# Patient Record
Sex: MALE | Race: OTHER | HISPANIC OR LATINO | Employment: FULL TIME | URBAN - METROPOLITAN AREA
[De-identification: names, ages, dates, MRNs, and addresses within clinical notes are randomized per-mention and may not be internally consistent; named-entity substitution may affect disease eponyms.]

---

## 2018-12-02 ENCOUNTER — HOSPITAL ENCOUNTER (EMERGENCY)
Facility: HOSPITAL | Age: 28
Discharge: HOME | End: 2018-12-02
Attending: EMERGENCY MEDICINE
Payer: COMMERCIAL

## 2018-12-02 VITALS
TEMPERATURE: 98 F | SYSTOLIC BLOOD PRESSURE: 157 MMHG | BODY MASS INDEX: 25.61 KG/M2 | HEIGHT: 64 IN | OXYGEN SATURATION: 98 % | RESPIRATION RATE: 18 BRPM | HEART RATE: 74 BPM | DIASTOLIC BLOOD PRESSURE: 92 MMHG | WEIGHT: 150 LBS

## 2018-12-02 DIAGNOSIS — H91.90 HEARING LOSS: Primary | ICD-10-CM

## 2018-12-02 PROCEDURE — 99283 EMERGENCY DEPT VISIT LOW MDM: CPT

## 2018-12-02 NOTE — DISCHARGE INSTRUCTIONS
Hearing Loss   WHAT YOU NEED TO KNOW:   Hearing loss means you have trouble hearing or you cannot hear at all in one or both ears  Hearing loss can happen suddenly or slowly over time  DISCHARGE INSTRUCTIONS:   Return to the emergency department if:   · You have fluid, pus, or blood leaking from your ear  · You have sudden, severe hearing loss  Contact your healthcare provider if:   · You have a fever  · You have ear pain that is getting worse  · You have ringing in your ears or dizziness that will not go away  · You have questions or concerns about your condition or care  Manage your hearing loss:   · Protect your hearing  Use ear plugs or ear protectors if you do activities that are very loud  These include using a lawnmower and power tools or going to a concert that has loud music  Use well-fitting foam earplugs that completely block your ear canal  Do not listen to loud music through headphones or earphones  · If you have hearing aids, wear them regularly  Talk to your healthcare provider if you are having trouble using your hearing aid  · Ask about cochlear implants  If hearing aids do not help you, talk to your healthcare provider  Cochlear implants may help you hear better  A cochlear implant is a tiny device that is put into your cochlea (part of your inner ear) during surgery  · Assistive listening devices  (ALDs) pic up sound and send it through earphones or a headset  ALDs can help you hear better when you are in a place with background noise  Examples include theaters, classrooms, or auditoriums  ALDs are also available for phones  ALDs can be used alone or with hearing aids or cochlear implants  · Tell people that you have hearing loss  Ask people to face you directly when they speak to you, and to slow down if they are speaking too fast  When you are in a group setting, sit in a location where you can clearly see the faces of the people who are speaking   Ask people not to speak loudly or shout when they are speaking to you  Try to talk with others in a quiet place  Background noise makes it harder for you to hear  · Pay close attention to your surroundings when you drive  Do not talk to people in your car while you are driving  Watch for problems on the road or approaching emergency vehicles  Follow up with your healthcare provider or audiologist as directed:  Write down your questions so you remember to ask them during your visits  © 2017 [facility] Information is for End User's use only and may not be sold, redistributed or otherwise used for commercial purposes  All illustrations and images included in CareNotes® are the copyrighted property of A D A M , Inc  or Adelso Carito  The above information is an  only  It is not intended as medical advice for individual conditions or treatments  Talk to your doctor, nurse or pharmacist before following any medical regimen to see if it is safe and effective for you

## 2018-12-17 NOTE — ED PROVIDER NOTES
History  Chief Complaint   Patient presents with    Hearing Problem     patient has chronic hearing loss, but in the last few days he has had increased difficulty hearing and is having problems hearing his own voice  34year old male presents today complaining of chronic hearing loss over the past 8-10 years  Pt has seen an ENT and an audiologist in the past but not recently  "They couldn't find anything wrong " Pt feels that it may be related to allergies, as he notices that his symptoms are worse in certain weather conditions and somewhat improve with the use of flonase and PO allergy medications  Pt denies any fevers, pain, tinnitus or drainage from the ears  Feels like his ear canal "swells shut sometimes"  Denies headache, sore throat, cough, congestion  Pt is requesting referral to a specialist because he feels that his hearing loss is worsening  Pt denies any trauma to the head, does not use q-tips  None       History reviewed  No pertinent past medical history  Past Surgical History:   Procedure Laterality Date    TONSILLECTOMY         History reviewed  No pertinent family history  I have reviewed and agree with the history as documented  Social History   Substance Use Topics    Smoking status: Never Smoker    Smokeless tobacco: Not on file    Alcohol use No        Review of Systems   HENT: Positive for hearing loss  All other systems reviewed and are negative  Physical Exam  Physical Exam   Constitutional: He is oriented to person, place, and time  He appears well-developed and well-nourished  No distress  HENT:   Head: Normocephalic and atraumatic  Right Ear: Tympanic membrane normal  Tympanic membrane is not perforated and not erythematous  No middle ear effusion  Left Ear: Tympanic membrane normal  Tympanic membrane is not perforated and not erythematous  No middle ear effusion  Mouth/Throat: Oropharynx is clear and moist  No oropharyngeal exudate     Eyes: Conjunctivae are normal    Neck: Normal range of motion  Neck supple  Cardiovascular: Normal rate, regular rhythm and normal heart sounds  Pulmonary/Chest: Effort normal and breath sounds normal  No respiratory distress  He has no wheezes  He has no rales  Neurological: He is alert and oriented to person, place, and time  Skin: Skin is warm and dry  Capillary refill takes less than 2 seconds  He is not diaphoretic  Psychiatric: He has a normal mood and affect  His behavior is normal        Vital Signs  ED Triage Vitals [12/02/18 1535]   Temperature Pulse Respirations Blood Pressure SpO2   98 °F (36 7 °C) 74 18 157/92 98 %      Temp Source Heart Rate Source Patient Position - Orthostatic VS BP Location FiO2 (%)   Oral Monitor -- Left arm --      Pain Score       No Pain           Vitals:    12/02/18 1535   BP: 157/92   Pulse: 74       Visual Acuity      ED Medications  Medications - No data to display    Diagnostic Studies  Results Reviewed     None                 No orders to display              Procedures  Procedures       Phone Contacts  ED Phone Contact    ED Course                               MDM  CritCare Time    Disposition  Final diagnoses:   Hearing loss     Time reflects when diagnosis was documented in both MDM as applicable and the Disposition within this note     Time User Action Codes Description Comment    12/2/2018  4:31 PM Chelsea Kruger Add [H91 90] Hearing loss       ED Disposition     ED Disposition Condition Comment    Discharge  [facility] discharge to home/self care  Condition at discharge: Good        Follow-up Information     Follow up With Specialties Details Why [address] DO Family Medicine   [address] [place] [id number]  [phone]      Rene Larsen MD Otolaryngology   [address] [place] [id number]  [address] DO Otolaryngology   [address] [place] [id number]  [phone]            There are no discharge medications for this patient  No discharge procedures on file      ED Provider  Electronically Signed by           Kika Banks PA-C  12/17/18 7799

## 2018-12-20 ENCOUNTER — TRANSCRIBE ORDERS (OUTPATIENT)
Dept: INTERNAL MEDICINE CLINIC | Facility: CLINIC | Age: 28
End: 2018-12-20

## 2018-12-20 DIAGNOSIS — H91.90 HEARING LOSS: Primary | ICD-10-CM

## 2019-02-08 ENCOUNTER — CONSULT (OUTPATIENT)
Dept: MULTI SPECIALTY CLINIC | Facility: CLINIC | Age: 29
End: 2019-02-08

## 2019-02-08 VITALS
BODY MASS INDEX: 27.7 KG/M2 | WEIGHT: 162.26 LBS | HEIGHT: 64 IN | SYSTOLIC BLOOD PRESSURE: 130 MMHG | DIASTOLIC BLOOD PRESSURE: 84 MMHG

## 2019-02-08 DIAGNOSIS — H93.13 TINNITUS OF BOTH EARS: ICD-10-CM

## 2019-02-08 DIAGNOSIS — H91.90 HEARING LOSS: Primary | ICD-10-CM

## 2019-02-08 PROCEDURE — 99202 OFFICE O/P NEW SF 15 MIN: CPT | Performed by: PHYSICIAN ASSISTANT

## 2019-02-08 RX ORDER — LORATADINE 10 MG/1
10 TABLET ORAL DAILY
COMMUNITY
End: 2019-05-02

## 2019-02-08 NOTE — PATIENT INSTRUCTIONS
Hearing Loss   WHAT YOU NEED TO KNOW:   What is hearing loss? Hearing loss means you have trouble hearing or you cannot hear at all in one or both ears  Hearing loss can happen suddenly or slowly over time  What are the types of hearing loss? · Conductive hearing loss  occurs when there is a problem with the outer or middle ear  Sound waves cannot reach your inner ear  This type of hearing loss may be caused by earwax buildup, fluid, or a punctured ear drum  It can often be treated by correcting the cause of the problem  · Sensorineural hearing loss  is caused by damage to parts of the inner ear  There is usually no cure for sensorineural hearing loss  · Mixed hearing loss  includes both conductive and sensorineural hearing loss  What causes hearing loss? · Aging    · Regular exposure to loud noise    · Head injury     · Blockage in your ear caused by earwax buildup, swelling, cyst, or other growth    · Medical conditions such as ear infections or otosclerosis (abnormal growth of bones in the ear)    · Medicines that damage your ears such as aspirin, certain antibiotics, and diuretics  What are the signs and symptoms that you may have hearing loss? · You often ask others to repeat what they just said  You may think people are mumbling or not speaking clearly  Family members ask you if your hearing is okay  · You cup your hand behind one of your ears when you listen  · You need to have the radio or television louder than usual     · You need to lean forward or turn your head to be able to hear  · You have ringing or buzzing in your ears, or you are dizzy  · You avoid certain situations because you have a hard time hearing  How is hearing loss diagnosed? Your healthcare provider will ask about your hearing loss and examine your ears  You may need any of the following:  · Hearing tests  may be done to check how well you hear whispered words or soft sounds such as a finger rub       · A tuning fork  may be used to test your hearing  A tuning fork is made of metal  It vibrates and makes noise when it strikes an object  Your healthcare provider will hold the tuning fork to the left and right of your head  He will ask if you can hear the noise and feel the vibration in each ear  · Audiometry  is a test used to measure how well you can hear different sounds  You will put on headphones that are attached to a machine  Sounds will be sent through the headphones  You will press a button or raise your hand when you hear the sounds  Each ear will be tested separately  Another device will be placed on the bone behind your ear  The device will test how well vibration moves through the bones  This is called bone conduction  · Tympanometry  is a test used to find hearing problems in the middle ear  A device is placed into your ear  The device creates pressure changes that make your eardrum vibrate  How is hearing loss treated? Treatment depends on the cause of your hearing loss  Removal of earwax or treatment for any medical conditions that have caused your hearing loss may be needed  You may need any of the following:  · A hearing aid  is a small device that fits inside your ear and helps you hear better  Your healthcare provider can help you choose a hearing aid that is right for you  · A cochlear implant  is a tiny device that is put into your cochlea (part of your inner ear) during surgery  This device can only be used in people with sensorineural hearing loss  · Assistive listening devices  (ALDs)  sound and send it through earphones or a headset  ALDs can help you hear better when you are in a place with background noise  Examples include theaters, classrooms, or auditoriums  ALDs are also available for phones  ALDs can be used alone or with hearing aids or cochlear implants  · Surgery  may be needed if your hearing loss is caused by otosclerosis   Surgery may also be done to place small tubes in your ear  These tubes help drain fluid and help prevent ear infections  How can I manage my hearing loss? · Protect your hearing  Use ear plugs or ear protectors if you do activities that are very loud  These include using a lawnmower and power tools or going to a concert that has loud music  Use well-fitting foam earplugs that completely block your ear canal  Do not listen to loud music through headphones or earphones  · Tell people that you have hearing loss  Ask people to face you directly when they speak to you, and to slow down if they are speaking too fast  When you are in a group setting, sit in a location where you can clearly see the faces of the people who are speaking  Ask people not to speak loudly or shout when they are speaking to you  Try to talk with others in a quiet place  Background noise makes it harder for you to hear  · Pay close attention to your surroundings when you drive  Do not talk to people in your car while you are driving  Watch for problems on the road or approaching emergency vehicles  When should I seek immediate care? · You have fluid, pus, or blood leaking from your ear  · You have sudden, severe hearing loss  When should I contact my healthcare provider? · You have a fever  · You have ear pain that is getting worse  · You have ringing in your ears or dizziness that will not go away  · You have questions or concerns about your condition or care  CARE AGREEMENT:   You have the right to help plan your care  Learn about your health condition and how it may be treated  Discuss treatment options with your caregivers to decide what care you want to receive  You always have the right to refuse treatment  The above information is an  only  It is not intended as medical advice for individual conditions or treatments   Talk to your doctor, nurse or pharmacist before following any medical regimen to see if it is safe and effective for you  © 2017 [facility] [facility] Information is for End User's use only and may not be sold, redistributed or otherwise used for commercial purposes  All illustrations and images included in CareNotes® are the copyrighted property of A D A M , Inc  or Adelso Arzate

## 2019-02-08 NOTE — PROGRESS NOTES
Consultation - Otolaryngology - Head and Neck Surgery  Facial Plastic and Reconstructive Surgery  [facility] 34 y o  male MRN: [id number]  Encounter: [id number]        Assessment/Plan:  1  Hearing loss  Ambulatory Referral to Otolaryngology         History of Present Illness   Physician Requesting Consult: Phillip Briones,   Reason for Consult / Principal Problem: Gradual hearing loss, tinnitus  HPI: [facility] is a 34y o  year old male who presents with Patient is here for evaluation of gradual hearing loss x 10 years  Getting worse x 2  Years ago  Patient thinks it gets worse during allergy season  + popping in both ears  Ringing in both ears x 2 years  No hx of frequent ear infections as an adult  R ear pain x 1-2 months, intermittent, 5/10  No drainage  Saw ENT last month, had Audio done, hearing aids were recommended  No loud noise exposure  No family hx of hearing loss  Has hx of vertigo  Lately doing well, no recurrent vertigo for 1 year  Has known hx of allergies, currently on Fluticasone, Loratadine  Review of systems:  10 Point ROS was performed and negative except as above or otherwise noted in the medical record  Historical Information   Past Medical History:   Diagnosis Date    Asthma      Past Surgical History:   Procedure Laterality Date    EYE SURGERY      TONSILLECTOMY       Social History   History   Alcohol Use No     History   Drug Use No     History   Smoking Status    Never Smoker   Smokeless Tobacco    Never Used     Family History: non-contributory    No current outpatient prescriptions on file prior to visit  No current facility-administered medications on file prior to visit  No Known Allergies    Vitals:    02/08/19 1258   BP: 130/84       Physical Exam   Constitutional: Oriented to person, place, and time  Well-developed and well-nourished, no apparent distress, non-toxic appearance   Cooperative, able to hear and answer questions without difficulty  Voice: Normal voice quality  Head: Normocephalic, atraumatic  No scars, masses or lesions  Face: Symmetric, no edema, no sinus tenderness  Eyes: Vision grossly intact, extra-ocular movement intact  Ears: External ears normal  Tympanic membranes intact with intact normal landmarks  No post-auricular erythema or tenderness  Nose: Septum intact, nares clear  Mucosa moist, turbinates well appearing  No crusting, polyps or discharge evident  Oral cavity: Dentition intact  Mucosa moist, lips without lesions or masses  Tongue mobile, floor of mouth soft and flat  Hard palate intact  No masses or lesions  Oropharynx: Uvula is midline, soft palate intact without lesion or mass  Oropharyngeal inlet without obstruction  Tonsils unremarkable  Posterior pharyngeal wall clear  No masses or lesions  Salivary glands:  Parotid glands and submandibular glands symmetric, no enlargement or tenderness  Neck: Normal laryngeal elevation with swallow  Trachea midline  No masses or lesions  No palpable adenopathy  Thyroid: Without tenderness or palpable nodules  Neurological: Cranial nerves 2-12 intact  Skin: Skin is warm and dry  Psychiatric: Normal mood and affect  1  Hearing loss     2  Tinnitus of both ears         Patient will have his Audio results faxed over to us  Faxed number given to the patient and his sister  He will continue with current regimen for allergies  · Protect your hearing  Use ear plugs or ear protectors if you do activities that are very loud  These include using a lawnmower and power tools or going to a concert that has loud music  Use well-fitting foam earplugs that completely block your ear canal  Do not listen to loud music through headphones or earphones  · Tell people that you have hearing loss    Ask people to face you directly when they speak to you, and to slow down if they are speaking too fast  When you are in a group setting, sit in a location where you can clearly see the faces of the people who are speaking  Ask people not to speak loudly or shout when they are speaking to you  Try to talk with others in a quiet place  Background noise makes it harder for you to hear  Greater than 40 minutes were spent in consultation  More than 1/2 of that time was spent in counselling and coordination of care

## 2019-03-04 ENCOUNTER — ANESTHESIA EVENT (INPATIENT)
Dept: PERIOP | Facility: HOSPITAL | Age: 29
DRG: 480 | End: 2019-03-04
Payer: COMMERCIAL

## 2019-03-04 ENCOUNTER — APPOINTMENT (EMERGENCY)
Dept: RADIOLOGY | Facility: HOSPITAL | Age: 29
DRG: 480 | End: 2019-03-04
Payer: COMMERCIAL

## 2019-03-04 ENCOUNTER — HOSPITAL ENCOUNTER (INPATIENT)
Facility: HOSPITAL | Age: 29
LOS: 6 days | Discharge: HOME HEALTH | DRG: 480 | End: 2019-03-10
Attending: SURGERY
Payer: COMMERCIAL

## 2019-03-04 ENCOUNTER — ANESTHESIA (INPATIENT)
Dept: PERIOP | Facility: HOSPITAL | Age: 29
DRG: 480 | End: 2019-03-04
Payer: COMMERCIAL

## 2019-03-04 DIAGNOSIS — R33.9 URINARY RETENTION: ICD-10-CM

## 2019-03-04 DIAGNOSIS — Z47.89 AFTERCARE FOLLOWING SURGERY OF THE MUSCULOSKELETAL SYSTEM: ICD-10-CM

## 2019-03-04 DIAGNOSIS — S72.352A CLOSED DISPLACED COMMINUTED FRACTURE OF SHAFT OF LEFT FEMUR, INITIAL ENCOUNTER (HCC): ICD-10-CM

## 2019-03-04 DIAGNOSIS — S72.002A CLOSED FRACTURE OF NECK OF LEFT FEMUR, INITIAL ENCOUNTER (HCC): Primary | ICD-10-CM

## 2019-03-04 DIAGNOSIS — S82.042B: ICD-10-CM

## 2019-03-04 DIAGNOSIS — S82.142B TYPE I OR II OPEN FRACTURE OF LEFT TIBIAL PLATEAU, INITIAL ENCOUNTER: ICD-10-CM

## 2019-03-04 LAB
ABO GROUP BLD: NORMAL
ABO GROUP BLD: NORMAL
ALBUMIN SERPL BCP-MCNC: 3.6 G/DL (ref 3.5–5)
ALP SERPL-CCNC: 90 U/L (ref 46–116)
ALT SERPL W P-5'-P-CCNC: 111 U/L (ref 12–78)
ANION GAP SERPL CALCULATED.3IONS-SCNC: 6 MMOL/L (ref 4–13)
ANION GAP SERPL CALCULATED.3IONS-SCNC: 8 MMOL/L (ref 4–13)
APTT PPP: 25 SECONDS (ref 26–38)
AST SERPL W P-5'-P-CCNC: 98 U/L (ref 5–45)
BASE EXCESS BLDA CALC-SCNC: -3 MMOL/L (ref -2–3)
BASOPHILS # BLD AUTO: 0.02 THOUSANDS/ΜL (ref 0–0.1)
BASOPHILS # BLD AUTO: 0.07 THOUSANDS/ΜL (ref 0–0.1)
BASOPHILS NFR BLD AUTO: 0 % (ref 0–1)
BASOPHILS NFR BLD AUTO: 0 % (ref 0–1)
BILIRUB SERPL-MCNC: 1.62 MG/DL (ref 0.2–1)
BLD GP AB SCN SERPL QL: NEGATIVE
BLD GP AB SCN SERPL QL: NEGATIVE
BUN SERPL-MCNC: 14 MG/DL (ref 5–25)
BUN SERPL-MCNC: 15 MG/DL (ref 5–25)
CA-I BLD-SCNC: 1.16 MMOL/L (ref 1.12–1.32)
CALCIUM SERPL-MCNC: 7.9 MG/DL (ref 8.3–10.1)
CALCIUM SERPL-MCNC: 8.1 MG/DL (ref 8.3–10.1)
CHLORIDE SERPL-SCNC: 110 MMOL/L (ref 100–108)
CHLORIDE SERPL-SCNC: 110 MMOL/L (ref 100–108)
CO2 SERPL-SCNC: 23 MMOL/L (ref 21–32)
CO2 SERPL-SCNC: 24 MMOL/L (ref 21–32)
CREAT SERPL-MCNC: 0.9 MG/DL (ref 0.6–1.3)
CREAT SERPL-MCNC: 0.91 MG/DL (ref 0.6–1.3)
EOSINOPHIL # BLD AUTO: 0 THOUSAND/ΜL (ref 0–0.61)
EOSINOPHIL # BLD AUTO: 0.27 THOUSAND/ΜL (ref 0–0.61)
EOSINOPHIL NFR BLD AUTO: 0 % (ref 0–6)
EOSINOPHIL NFR BLD AUTO: 2 % (ref 0–6)
ERYTHROCYTE [DISTWIDTH] IN BLOOD BY AUTOMATED COUNT: 12.4 % (ref 11.6–15.1)
ERYTHROCYTE [DISTWIDTH] IN BLOOD BY AUTOMATED COUNT: 12.7 % (ref 11.6–15.1)
GFR SERPL CREATININE-BSD FRML MDRD: 116 ML/MIN/1.73SQ M
GFR SERPL CREATININE-BSD FRML MDRD: 117 ML/MIN/1.73SQ M
GLUCOSE SERPL-MCNC: 142 MG/DL (ref 65–140)
GLUCOSE SERPL-MCNC: 143 MG/DL (ref 65–140)
GLUCOSE SERPL-MCNC: 145 MG/DL (ref 65–140)
HCO3 BLDA-SCNC: 22.4 MMOL/L (ref 24–30)
HCT VFR BLD AUTO: 30.8 % (ref 36.5–49.3)
HCT VFR BLD AUTO: 39.4 % (ref 36.5–49.3)
HCT VFR BLD CALC: 38 % (ref 36.5–49.3)
HGB BLD-MCNC: 10.5 G/DL (ref 12–17)
HGB BLD-MCNC: 13.8 G/DL (ref 12–17)
HGB BLDA-MCNC: 12.9 G/DL (ref 12–17)
IMM GRANULOCYTES # BLD AUTO: 0.04 THOUSAND/UL (ref 0–0.2)
IMM GRANULOCYTES # BLD AUTO: 0.18 THOUSAND/UL (ref 0–0.2)
IMM GRANULOCYTES NFR BLD AUTO: 0 % (ref 0–2)
IMM GRANULOCYTES NFR BLD AUTO: 1 % (ref 0–2)
INR PPP: 1.06 (ref 0.86–1.17)
LYMPHOCYTES # BLD AUTO: 0.98 THOUSANDS/ΜL (ref 0.6–4.47)
LYMPHOCYTES # BLD AUTO: 4.78 THOUSANDS/ΜL (ref 0.6–4.47)
LYMPHOCYTES NFR BLD AUTO: 28 % (ref 14–44)
LYMPHOCYTES NFR BLD AUTO: 8 % (ref 14–44)
MCH RBC QN AUTO: 29.1 PG (ref 26.8–34.3)
MCH RBC QN AUTO: 29.5 PG (ref 26.8–34.3)
MCHC RBC AUTO-ENTMCNC: 34.1 G/DL (ref 31.4–37.4)
MCHC RBC AUTO-ENTMCNC: 35 G/DL (ref 31.4–37.4)
MCV RBC AUTO: 84 FL (ref 82–98)
MCV RBC AUTO: 85 FL (ref 82–98)
MONOCYTES # BLD AUTO: 0.62 THOUSAND/ΜL (ref 0.17–1.22)
MONOCYTES # BLD AUTO: 0.94 THOUSAND/ΜL (ref 0.17–1.22)
MONOCYTES NFR BLD AUTO: 5 % (ref 4–12)
MONOCYTES NFR BLD AUTO: 5 % (ref 4–12)
NEUTROPHILS # BLD AUTO: 10.13 THOUSANDS/ΜL (ref 1.85–7.62)
NEUTROPHILS # BLD AUTO: 11.04 THOUSANDS/ΜL (ref 1.85–7.62)
NEUTS SEG NFR BLD AUTO: 64 % (ref 43–75)
NEUTS SEG NFR BLD AUTO: 87 % (ref 43–75)
NRBC BLD AUTO-RTO: 0 /100 WBCS
NRBC BLD AUTO-RTO: 0 /100 WBCS
PCO2 BLD: 24 MMOL/L (ref 21–32)
PCO2 BLD: 41.9 MM HG (ref 42–50)
PH BLD: 7.34 [PH] (ref 7.3–7.4)
PLATELET # BLD AUTO: 214 THOUSANDS/UL (ref 149–390)
PLATELET # BLD AUTO: 307 THOUSANDS/UL (ref 149–390)
PMV BLD AUTO: 9.3 FL (ref 8.9–12.7)
PMV BLD AUTO: 9.4 FL (ref 8.9–12.7)
PO2 BLD: 52 MM HG (ref 35–45)
POTASSIUM BLD-SCNC: 3.4 MMOL/L (ref 3.5–5.3)
POTASSIUM SERPL-SCNC: 3.5 MMOL/L (ref 3.5–5.3)
POTASSIUM SERPL-SCNC: 3.5 MMOL/L (ref 3.5–5.3)
PROT SERPL-MCNC: 6.5 G/DL (ref 6.4–8.2)
PROTHROMBIN TIME: 13.9 SECONDS (ref 11.8–14.2)
RBC # BLD AUTO: 3.61 MILLION/UL (ref 3.88–5.62)
RBC # BLD AUTO: 4.68 MILLION/UL (ref 3.88–5.62)
RH BLD: POSITIVE
RH BLD: POSITIVE
SAO2 % BLD FROM PO2: 84 % (ref 95–98)
SODIUM BLD-SCNC: 142 MMOL/L (ref 136–145)
SODIUM SERPL-SCNC: 139 MMOL/L (ref 136–145)
SODIUM SERPL-SCNC: 142 MMOL/L (ref 136–145)
SPECIMEN EXPIRATION DATE: NORMAL
SPECIMEN EXPIRATION DATE: NORMAL
SPECIMEN SOURCE: ABNORMAL
WBC # BLD AUTO: 11.79 THOUSAND/UL (ref 4.31–10.16)
WBC # BLD AUTO: 17.28 THOUSAND/UL (ref 4.31–10.16)

## 2019-03-04 PROCEDURE — 73552 X-RAY EXAM OF FEMUR 2/>: CPT

## 2019-03-04 PROCEDURE — C1713 ANCHOR/SCREW BN/BN,TIS/BN: HCPCS | Performed by: ORTHOPAEDIC SURGERY

## 2019-03-04 PROCEDURE — 86901 BLOOD TYPING SEROLOGIC RH(D): CPT | Performed by: SURGERY

## 2019-03-04 PROCEDURE — 86923 COMPATIBILITY TEST ELECTRIC: CPT

## 2019-03-04 PROCEDURE — 73560 X-RAY EXAM OF KNEE 1 OR 2: CPT

## 2019-03-04 PROCEDURE — 27235 TREAT THIGH FRACTURE: CPT | Performed by: ORTHOPAEDIC SURGERY

## 2019-03-04 PROCEDURE — 96375 TX/PRO/DX INJ NEW DRUG ADDON: CPT

## 2019-03-04 PROCEDURE — 82330 ASSAY OF CALCIUM: CPT

## 2019-03-04 PROCEDURE — 73080 X-RAY EXAM OF ELBOW: CPT

## 2019-03-04 PROCEDURE — 99285 EMERGENCY DEPT VISIT HI MDM: CPT

## 2019-03-04 PROCEDURE — 86900 BLOOD TYPING SEROLOGIC ABO: CPT | Performed by: EMERGENCY MEDICINE

## 2019-03-04 PROCEDURE — 82803 BLOOD GASES ANY COMBINATION: CPT

## 2019-03-04 PROCEDURE — 80048 BASIC METABOLIC PNL TOTAL CA: CPT | Performed by: SURGERY

## 2019-03-04 PROCEDURE — 84132 ASSAY OF SERUM POTASSIUM: CPT

## 2019-03-04 PROCEDURE — 84295 ASSAY OF SERUM SODIUM: CPT

## 2019-03-04 PROCEDURE — 85025 COMPLETE CBC W/AUTO DIFF WBC: CPT | Performed by: SURGERY

## 2019-03-04 PROCEDURE — 72125 CT NECK SPINE W/O DYE: CPT

## 2019-03-04 PROCEDURE — 90715 TDAP VACCINE 7 YRS/> IM: CPT | Performed by: SURGERY

## 2019-03-04 PROCEDURE — 86850 RBC ANTIBODY SCREEN: CPT | Performed by: SURGERY

## 2019-03-04 PROCEDURE — 27524 TREAT KNEECAP FRACTURE: CPT | Performed by: ORTHOPAEDIC SURGERY

## 2019-03-04 PROCEDURE — 99222 1ST HOSP IP/OBS MODERATE 55: CPT | Performed by: SURGERY

## 2019-03-04 PROCEDURE — 82947 ASSAY GLUCOSE BLOOD QUANT: CPT

## 2019-03-04 PROCEDURE — 86900 BLOOD TYPING SEROLOGIC ABO: CPT | Performed by: SURGERY

## 2019-03-04 PROCEDURE — 86850 RBC ANTIBODY SCREEN: CPT | Performed by: EMERGENCY MEDICINE

## 2019-03-04 PROCEDURE — 27506 TREATMENT OF THIGH FRACTURE: CPT | Performed by: ORTHOPAEDIC SURGERY

## 2019-03-04 PROCEDURE — 99221 1ST HOSP IP/OBS SF/LOW 40: CPT | Performed by: ORTHOPAEDIC SURGERY

## 2019-03-04 PROCEDURE — 11010 DEBRIDE SKIN AT FX SITE: CPT | Performed by: ORTHOPAEDIC SURGERY

## 2019-03-04 PROCEDURE — 86901 BLOOD TYPING SEROLOGIC RH(D): CPT | Performed by: EMERGENCY MEDICINE

## 2019-03-04 PROCEDURE — 70450 CT HEAD/BRAIN W/O DYE: CPT

## 2019-03-04 PROCEDURE — C1769 GUIDE WIRE: HCPCS | Performed by: ORTHOPAEDIC SURGERY

## 2019-03-04 PROCEDURE — 85014 HEMATOCRIT: CPT

## 2019-03-04 PROCEDURE — 90471 IMMUNIZATION ADMIN: CPT

## 2019-03-04 PROCEDURE — 73030 X-RAY EXAM OF SHOULDER: CPT

## 2019-03-04 PROCEDURE — 71260 CT THORAX DX C+: CPT

## 2019-03-04 PROCEDURE — 85025 COMPLETE CBC W/AUTO DIFF WBC: CPT | Performed by: ORTHOPAEDIC SURGERY

## 2019-03-04 PROCEDURE — 36415 COLL VENOUS BLD VENIPUNCTURE: CPT | Performed by: SURGERY

## 2019-03-04 PROCEDURE — 0QSF04Z REPOSITION LEFT PATELLA WITH INTERNAL FIXATION DEVICE, OPEN APPROACH: ICD-10-PCS | Performed by: ORTHOPAEDIC SURGERY

## 2019-03-04 PROCEDURE — 85730 THROMBOPLASTIN TIME PARTIAL: CPT | Performed by: EMERGENCY MEDICINE

## 2019-03-04 PROCEDURE — 74177 CT ABD & PELVIS W/CONTRAST: CPT

## 2019-03-04 PROCEDURE — 0QSC06Z REPOSITION LEFT LOWER FEMUR WITH INTRAMEDULLARY INTERNAL FIXATION DEVICE, OPEN APPROACH: ICD-10-PCS | Performed by: ORTHOPAEDIC SURGERY

## 2019-03-04 PROCEDURE — 0QSH04Z REPOSITION LEFT TIBIA WITH INTERNAL FIXATION DEVICE, OPEN APPROACH: ICD-10-PCS | Performed by: ORTHOPAEDIC SURGERY

## 2019-03-04 PROCEDURE — 0QSC04Z REPOSITION LEFT LOWER FEMUR WITH INTERNAL FIXATION DEVICE, OPEN APPROACH: ICD-10-PCS | Performed by: ORTHOPAEDIC SURGERY

## 2019-03-04 PROCEDURE — 27536 TREAT KNEE FRACTURE: CPT | Performed by: ORTHOPAEDIC SURGERY

## 2019-03-04 PROCEDURE — 80053 COMPREHEN METABOLIC PANEL: CPT | Performed by: EMERGENCY MEDICINE

## 2019-03-04 PROCEDURE — 96365 THER/PROPH/DIAG IV INF INIT: CPT

## 2019-03-04 PROCEDURE — 85610 PROTHROMBIN TIME: CPT | Performed by: EMERGENCY MEDICINE

## 2019-03-04 DEVICE — 2.4MM VA LOCKING SCREW STARDRIVE 18MM
Type: IMPLANTABLE DEVICE | Site: PATELLA | Status: NON-FUNCTIONAL
Removed: 2020-06-12

## 2019-03-04 DEVICE — 2.4MM CORTEX SCREW SLF-TPNG WITH T8 STARDRIVE RECESS 16MM
Type: IMPLANTABLE DEVICE | Site: PATELLA | Status: NON-FUNCTIONAL
Removed: 2020-06-12

## 2019-03-04 DEVICE — 6.5MM CANNULATED SCREW 16MM THREAD 80MM: Type: IMPLANTABLE DEVICE | Site: HIP | Status: FUNCTIONAL

## 2019-03-04 DEVICE — 3.5MM CORTEX SCREW SELF-TAPPING 32MM
Type: IMPLANTABLE DEVICE | Site: TIBIA | Status: NON-FUNCTIONAL
Removed: 2020-06-12

## 2019-03-04 DEVICE — 3.5MM CORTEX SCREW SELF-TAPPING 24MM
Type: IMPLANTABLE DEVICE | Site: TIBIA | Status: NON-FUNCTIONAL
Removed: 2020-06-12

## 2019-03-04 DEVICE — 11MM TI CANN RETRO/ANTEGRADE FEMORAL NAIL-EX/320MM-STERILE
Type: IMPLANTABLE DEVICE | Site: FEMUR | Status: FUNCTIONAL
Brand: EXPERT NAIL

## 2019-03-04 DEVICE — 3.5MM CORTEX SCREW SELF-TAPPING 48MM
Type: IMPLANTABLE DEVICE | Site: TIBIA | Status: NON-FUNCTIONAL
Removed: 2020-06-12

## 2019-03-04 DEVICE — 3.5MM LCP® MEDIAL PROXIMAL TIBIA PLATE 10H/LEFT 171MM
Type: IMPLANTABLE DEVICE | Site: TIBIA | Status: NON-FUNCTIONAL
Brand: LCP
Removed: 2020-06-12

## 2019-03-04 DEVICE — 7.3MM CANNULATED SCREW 16MM THREAD/80MM: Type: IMPLANTABLE DEVICE | Site: HIP | Status: FUNCTIONAL

## 2019-03-04 DEVICE — 2.4MM CORTEX SCREW SLF-TPNG WITH T8 STARDRIVE RECESS 12MM
Type: IMPLANTABLE DEVICE | Site: PATELLA | Status: NON-FUNCTIONAL
Removed: 2020-06-12

## 2019-03-04 DEVICE — 3.5MM CORTEX SCREW SELF-TAPPING 30MM
Type: IMPLANTABLE DEVICE | Site: TIBIA | Status: NON-FUNCTIONAL
Removed: 2020-06-12

## 2019-03-04 DEVICE — 3.5MM PELVIC CORTEX SCREW SELF-TAPPING 75MM
Type: IMPLANTABLE DEVICE | Site: TIBIA | Status: NON-FUNCTIONAL
Removed: 2020-06-12

## 2019-03-04 DEVICE — 2.4MM VA LOCKING SCREW STARDRIVE 22MM
Type: IMPLANTABLE DEVICE | Site: PATELLA | Status: NON-FUNCTIONAL
Removed: 2020-06-12

## 2019-03-04 DEVICE — 5.0MM TI LOCKING SCREW W/T25 STARDRIVE 60MM F/IM NAIL-STER: Type: IMPLANTABLE DEVICE | Site: FEMUR | Status: FUNCTIONAL

## 2019-03-04 DEVICE — 3.5MM CORTEX SCREW SELF-TAPPING 34MM
Type: IMPLANTABLE DEVICE | Site: TIBIA | Status: NON-FUNCTIONAL
Removed: 2020-06-12

## 2019-03-04 DEVICE — 2.4MM VA LOCKING SCREW STARDRIVE 10MM
Type: IMPLANTABLE DEVICE | Site: PATELLA | Status: NON-FUNCTIONAL
Removed: 2020-06-12

## 2019-03-04 DEVICE — 5.0MM TI LOCKING SCREW W/T25 STARDRIVE 80MM F/IM NAIL-STER: Type: IMPLANTABLE DEVICE | Site: FEMUR | Status: FUNCTIONAL

## 2019-03-04 DEVICE — 3.5MM CORTEX SCREW SELF-TAPPING 46MM
Type: IMPLANTABLE DEVICE | Site: TIBIA | Status: NON-FUNCTIONAL
Removed: 2020-06-12

## 2019-03-04 DEVICE — 2.4MM VA LOCKING SCREW STARDRIVE 20MM
Type: IMPLANTABLE DEVICE | Site: PATELLA | Status: NON-FUNCTIONAL
Removed: 2020-06-12

## 2019-03-04 DEVICE — 5.0MM TI LOCKING SCREW W/T25 STARDRIVE 34MM F/IM NAIL-STER: Type: IMPLANTABLE DEVICE | Site: FEMUR | Status: FUNCTIONAL

## 2019-03-04 DEVICE — 6.5MM CANNULATED SCREW 16MM THREAD 75MM: Type: IMPLANTABLE DEVICE | Site: HIP | Status: FUNCTIONAL

## 2019-03-04 DEVICE — 2.4MM/2.7MM VA-LOCKING MESH PLATE/5 X 12 HOLES: Type: IMPLANTABLE DEVICE | Site: PATELLA | Status: FUNCTIONAL

## 2019-03-04 DEVICE — 2.4MM VA LOCKING SCREW STARDRIVE 16MM
Type: IMPLANTABLE DEVICE | Site: PATELLA | Status: NON-FUNCTIONAL
Removed: 2020-06-12

## 2019-03-04 RX ORDER — KETAMINE HYDROCHLORIDE 50 MG/ML
INJECTION, SOLUTION, CONCENTRATE INTRAMUSCULAR; INTRAVENOUS AS NEEDED
Status: DISCONTINUED | OUTPATIENT
Start: 2019-03-04 | End: 2019-03-04 | Stop reason: SURG

## 2019-03-04 RX ORDER — ROCURONIUM BROMIDE 10 MG/ML
INJECTION, SOLUTION INTRAVENOUS AS NEEDED
Status: DISCONTINUED | OUTPATIENT
Start: 2019-03-04 | End: 2019-03-04 | Stop reason: SURG

## 2019-03-04 RX ORDER — CEFAZOLIN SODIUM 1 G/50ML
SOLUTION INTRAVENOUS
Status: COMPLETED | OUTPATIENT
Start: 2019-03-04 | End: 2019-03-04

## 2019-03-04 RX ORDER — SODIUM CHLORIDE, SODIUM LACTATE, POTASSIUM CHLORIDE, CALCIUM CHLORIDE 600; 310; 30; 20 MG/100ML; MG/100ML; MG/100ML; MG/100ML
100 INJECTION, SOLUTION INTRAVENOUS CONTINUOUS
Status: DISCONTINUED | OUTPATIENT
Start: 2019-03-04 | End: 2019-03-05

## 2019-03-04 RX ORDER — HYDROMORPHONE HYDROCHLORIDE 2 MG/ML
INJECTION, SOLUTION INTRAMUSCULAR; INTRAVENOUS; SUBCUTANEOUS AS NEEDED
Status: DISCONTINUED | OUTPATIENT
Start: 2019-03-04 | End: 2019-03-04 | Stop reason: SURG

## 2019-03-04 RX ORDER — HYDROMORPHONE HCL/PF 1 MG/ML
0.5 SYRINGE (ML) INJECTION
Status: DISCONTINUED | OUTPATIENT
Start: 2019-03-04 | End: 2019-03-04 | Stop reason: HOSPADM

## 2019-03-04 RX ORDER — OXYCODONE HYDROCHLORIDE 5 MG/1
5 TABLET ORAL EVERY 4 HOURS PRN
Qty: 30 TABLET | Refills: 0 | Status: SHIPPED | OUTPATIENT
Start: 2019-03-04 | End: 2019-03-14

## 2019-03-04 RX ORDER — PROMETHAZINE HYDROCHLORIDE 25 MG/ML
25 INJECTION, SOLUTION INTRAMUSCULAR; INTRAVENOUS ONCE AS NEEDED
Status: DISCONTINUED | OUTPATIENT
Start: 2019-03-04 | End: 2019-03-04 | Stop reason: HOSPADM

## 2019-03-04 RX ORDER — PROPOFOL 10 MG/ML
INJECTION, EMULSION INTRAVENOUS AS NEEDED
Status: DISCONTINUED | OUTPATIENT
Start: 2019-03-04 | End: 2019-03-04 | Stop reason: SURG

## 2019-03-04 RX ORDER — NEOSTIGMINE METHYLSULFATE 1 MG/ML
INJECTION INTRAVENOUS AS NEEDED
Status: DISCONTINUED | OUTPATIENT
Start: 2019-03-04 | End: 2019-03-04 | Stop reason: SURG

## 2019-03-04 RX ORDER — ONDANSETRON 2 MG/ML
INJECTION INTRAMUSCULAR; INTRAVENOUS AS NEEDED
Status: DISCONTINUED | OUTPATIENT
Start: 2019-03-04 | End: 2019-03-04 | Stop reason: SURG

## 2019-03-04 RX ORDER — LIDOCAINE HYDROCHLORIDE 10 MG/ML
INJECTION, SOLUTION INFILTRATION; PERINEURAL AS NEEDED
Status: DISCONTINUED | OUTPATIENT
Start: 2019-03-04 | End: 2019-03-04 | Stop reason: SURG

## 2019-03-04 RX ORDER — SODIUM CHLORIDE, SODIUM LACTATE, POTASSIUM CHLORIDE, CALCIUM CHLORIDE 600; 310; 30; 20 MG/100ML; MG/100ML; MG/100ML; MG/100ML
INJECTION, SOLUTION INTRAVENOUS CONTINUOUS PRN
Status: DISCONTINUED | OUTPATIENT
Start: 2019-03-04 | End: 2019-03-04 | Stop reason: SURG

## 2019-03-04 RX ORDER — CEFAZOLIN SODIUM 1 G/3ML
INJECTION, POWDER, FOR SOLUTION INTRAMUSCULAR; INTRAVENOUS AS NEEDED
Status: DISCONTINUED | OUTPATIENT
Start: 2019-03-04 | End: 2019-03-04 | Stop reason: SURG

## 2019-03-04 RX ORDER — FENTANYL CITRATE 50 UG/ML
INJECTION, SOLUTION INTRAMUSCULAR; INTRAVENOUS AS NEEDED
Status: DISCONTINUED | OUTPATIENT
Start: 2019-03-04 | End: 2019-03-04 | Stop reason: SURG

## 2019-03-04 RX ORDER — FENTANYL CITRATE 50 UG/ML
INJECTION, SOLUTION INTRAMUSCULAR; INTRAVENOUS CODE/TRAUMA/SEDATION MEDICATION
Status: COMPLETED | OUTPATIENT
Start: 2019-03-04 | End: 2019-03-04

## 2019-03-04 RX ORDER — OXYCODONE HYDROCHLORIDE 5 MG/1
5 TABLET ORAL EVERY 4 HOURS PRN
Status: DISCONTINUED | OUTPATIENT
Start: 2019-03-04 | End: 2019-03-10 | Stop reason: HOSPADM

## 2019-03-04 RX ORDER — SODIUM CHLORIDE, SODIUM GLUCONATE, SODIUM ACETATE, POTASSIUM CHLORIDE, MAGNESIUM CHLORIDE, SODIUM PHOSPHATE, DIBASIC, AND POTASSIUM PHOSPHATE .53; .5; .37; .037; .03; .012; .00082 G/100ML; G/100ML; G/100ML; G/100ML; G/100ML; G/100ML; G/100ML
125 INJECTION, SOLUTION INTRAVENOUS CONTINUOUS
Status: DISCONTINUED | OUTPATIENT
Start: 2019-03-04 | End: 2019-03-05

## 2019-03-04 RX ORDER — MIDAZOLAM HYDROCHLORIDE 1 MG/ML
INJECTION INTRAMUSCULAR; INTRAVENOUS AS NEEDED
Status: DISCONTINUED | OUTPATIENT
Start: 2019-03-04 | End: 2019-03-04 | Stop reason: SURG

## 2019-03-04 RX ORDER — ACETAMINOPHEN 325 MG/1
975 TABLET ORAL EVERY 8 HOURS SCHEDULED
Status: DISCONTINUED | OUTPATIENT
Start: 2019-03-04 | End: 2019-03-05

## 2019-03-04 RX ORDER — FENTANYL CITRATE/PF 50 MCG/ML
25 SYRINGE (ML) INJECTION
Status: DISCONTINUED | OUTPATIENT
Start: 2019-03-04 | End: 2019-03-04 | Stop reason: HOSPADM

## 2019-03-04 RX ORDER — OXYCODONE HYDROCHLORIDE 10 MG/1
10 TABLET ORAL EVERY 4 HOURS PRN
Status: DISCONTINUED | OUTPATIENT
Start: 2019-03-04 | End: 2019-03-10 | Stop reason: HOSPADM

## 2019-03-04 RX ORDER — GLYCOPYRROLATE 0.2 MG/ML
INJECTION INTRAMUSCULAR; INTRAVENOUS AS NEEDED
Status: DISCONTINUED | OUTPATIENT
Start: 2019-03-04 | End: 2019-03-04 | Stop reason: SURG

## 2019-03-04 RX ORDER — ONDANSETRON 2 MG/ML
4 INJECTION INTRAMUSCULAR; INTRAVENOUS EVERY 6 HOURS PRN
Status: DISCONTINUED | OUTPATIENT
Start: 2019-03-04 | End: 2019-03-04 | Stop reason: HOSPADM

## 2019-03-04 RX ORDER — FENTANYL CITRATE 50 UG/ML
INJECTION, SOLUTION INTRAMUSCULAR; INTRAVENOUS
Status: COMPLETED
Start: 2019-03-04 | End: 2019-03-04

## 2019-03-04 RX ORDER — ALBUMIN, HUMAN INJ 5% 5 %
SOLUTION INTRAVENOUS CONTINUOUS PRN
Status: DISCONTINUED | OUTPATIENT
Start: 2019-03-04 | End: 2019-03-04 | Stop reason: SURG

## 2019-03-04 RX ORDER — SUCCINYLCHOLINE/SOD CL,ISO/PF 100 MG/5ML
SYRINGE (ML) INTRAVENOUS AS NEEDED
Status: DISCONTINUED | OUTPATIENT
Start: 2019-03-04 | End: 2019-03-04 | Stop reason: SURG

## 2019-03-04 RX ORDER — SODIUM CHLORIDE 9 MG/ML
INJECTION, SOLUTION INTRAVENOUS CONTINUOUS PRN
Status: DISCONTINUED | OUTPATIENT
Start: 2019-03-04 | End: 2019-03-04 | Stop reason: SURG

## 2019-03-04 RX ORDER — MAGNESIUM HYDROXIDE 1200 MG/15ML
LIQUID ORAL AS NEEDED
Status: DISCONTINUED | OUTPATIENT
Start: 2019-03-04 | End: 2019-03-04 | Stop reason: HOSPADM

## 2019-03-04 RX ORDER — MIDAZOLAM HYDROCHLORIDE 1 MG/ML
INJECTION INTRAMUSCULAR; INTRAVENOUS
Status: COMPLETED
Start: 2019-03-04 | End: 2019-03-04

## 2019-03-04 RX ADMIN — ALBUMIN (HUMAN): 12.5 SOLUTION INTRAVENOUS at 15:54

## 2019-03-04 RX ADMIN — SODIUM CHLORIDE: 0.9 INJECTION, SOLUTION INTRAVENOUS at 15:26

## 2019-03-04 RX ADMIN — Medication 100 MG: at 15:24

## 2019-03-04 RX ADMIN — MIDAZOLAM 2 MG: 1 INJECTION INTRAMUSCULAR; INTRAVENOUS at 15:23

## 2019-03-04 RX ADMIN — ACETAMINOPHEN 975 MG: 325 TABLET ORAL at 23:54

## 2019-03-04 RX ADMIN — PHENYLEPHRINE HYDROCHLORIDE 200 MCG: 10 INJECTION INTRAVENOUS at 15:32

## 2019-03-04 RX ADMIN — OXYCODONE HYDROCHLORIDE 10 MG: 10 TABLET ORAL at 23:54

## 2019-03-04 RX ADMIN — SODIUM CHLORIDE, SODIUM LACTATE, POTASSIUM CHLORIDE, AND CALCIUM CHLORIDE: .6; .31; .03; .02 INJECTION, SOLUTION INTRAVENOUS at 18:55

## 2019-03-04 RX ADMIN — ROCURONIUM BROMIDE 20 MG: 10 INJECTION INTRAVENOUS at 16:41

## 2019-03-04 RX ADMIN — GLYCOPYRROLATE 0.4 MG: 0.2 INJECTION, SOLUTION INTRAMUSCULAR; INTRAVENOUS at 20:53

## 2019-03-04 RX ADMIN — CEFAZOLIN 2000 MG: 1 INJECTION, POWDER, FOR SOLUTION INTRAVENOUS at 19:17

## 2019-03-04 RX ADMIN — ROCURONIUM BROMIDE 10 MG: 10 INJECTION INTRAVENOUS at 17:34

## 2019-03-04 RX ADMIN — PROPOFOL 200 MG: 10 INJECTION, EMULSION INTRAVENOUS at 15:24

## 2019-03-04 RX ADMIN — PHENYLEPHRINE HYDROCHLORIDE 50 MCG: 10 INJECTION INTRAVENOUS at 17:21

## 2019-03-04 RX ADMIN — IOHEXOL 100 ML: 350 INJECTION, SOLUTION INTRAVENOUS at 13:53

## 2019-03-04 RX ADMIN — ROCURONIUM BROMIDE 20 MG: 10 INJECTION INTRAVENOUS at 17:09

## 2019-03-04 RX ADMIN — FENTANYL CITRATE 50 MCG: 50 INJECTION, SOLUTION INTRAMUSCULAR; INTRAVENOUS at 15:44

## 2019-03-04 RX ADMIN — FENTANYL CITRATE 50 MCG: 50 INJECTION, SOLUTION INTRAMUSCULAR; INTRAVENOUS at 16:43

## 2019-03-04 RX ADMIN — PHENYLEPHRINE HYDROCHLORIDE 200 MCG: 10 INJECTION INTRAVENOUS at 15:50

## 2019-03-04 RX ADMIN — SODIUM CHLORIDE, SODIUM GLUCONATE, SODIUM ACETATE, POTASSIUM CHLORIDE, MAGNESIUM CHLORIDE, SODIUM PHOSPHATE, DIBASIC, AND POTASSIUM PHOSPHATE 125 ML/HR: .53; .5; .37; .037; .03; .012; .00082 INJECTION, SOLUTION INTRAVENOUS at 22:24

## 2019-03-04 RX ADMIN — TETANUS TOXOID, REDUCED DIPHTHERIA TOXOID AND ACELLULAR PERTUSSIS VACCINE, ADSORBED 0.5 ML: 5; 2.5; 8; 8; 2.5 SUSPENSION INTRAMUSCULAR at 13:33

## 2019-03-04 RX ADMIN — KETAMINE HYDROCHLORIDE 25 MG: 50 INJECTION, SOLUTION INTRAMUSCULAR; INTRAVENOUS at 16:12

## 2019-03-04 RX ADMIN — DEXAMETHASONE SODIUM PHOSPHATE 10 MG: 10 INJECTION INTRAMUSCULAR; INTRAVENOUS at 16:05

## 2019-03-04 RX ADMIN — ALBUMIN (HUMAN): 12.5 SOLUTION INTRAVENOUS at 15:35

## 2019-03-04 RX ADMIN — SODIUM CHLORIDE, SODIUM LACTATE, POTASSIUM CHLORIDE, AND CALCIUM CHLORIDE: .6; .31; .03; .02 INJECTION, SOLUTION INTRAVENOUS at 15:15

## 2019-03-04 RX ADMIN — PHENYLEPHRINE HYDROCHLORIDE 100 MCG: 10 INJECTION INTRAVENOUS at 15:27

## 2019-03-04 RX ADMIN — FENTANYL CITRATE 25 MCG: 50 INJECTION, SOLUTION INTRAMUSCULAR; INTRAVENOUS at 22:13

## 2019-03-04 RX ADMIN — CEFAZOLIN 2000 MG: 1 INJECTION, POWDER, FOR SOLUTION INTRAVENOUS at 15:29

## 2019-03-04 RX ADMIN — CEFAZOLIN SODIUM 2000 MG: 10 INJECTION, POWDER, FOR SOLUTION INTRAVENOUS at 23:56

## 2019-03-04 RX ADMIN — PHENYLEPHRINE HYDROCHLORIDE 100 MCG: 10 INJECTION INTRAVENOUS at 15:48

## 2019-03-04 RX ADMIN — PHENYLEPHRINE HYDROCHLORIDE 20 MCG/MIN: 10 INJECTION INTRAVENOUS at 15:56

## 2019-03-04 RX ADMIN — ONDANSETRON 4 MG: 2 INJECTION INTRAMUSCULAR; INTRAVENOUS at 22:13

## 2019-03-04 RX ADMIN — PHENYLEPHRINE HYDROCHLORIDE 100 MCG: 10 INJECTION INTRAVENOUS at 15:57

## 2019-03-04 RX ADMIN — PHENYLEPHRINE HYDROCHLORIDE 200 MCG: 10 INJECTION INTRAVENOUS at 15:36

## 2019-03-04 RX ADMIN — ONDANSETRON 4 MG: 2 INJECTION INTRAMUSCULAR; INTRAVENOUS at 20:19

## 2019-03-04 RX ADMIN — LIDOCAINE HYDROCHLORIDE 50 MG: 10 INJECTION, SOLUTION INFILTRATION; PERINEURAL at 15:24

## 2019-03-04 RX ADMIN — HYDROMORPHONE HYDROCHLORIDE 1 MG: 2 INJECTION, SOLUTION INTRAMUSCULAR; INTRAVENOUS; SUBCUTANEOUS at 19:05

## 2019-03-04 RX ADMIN — FENTANYL CITRATE 25 MCG: 50 INJECTION, SOLUTION INTRAMUSCULAR; INTRAVENOUS at 21:40

## 2019-03-04 RX ADMIN — NEOSTIGMINE METHYLSULFATE 3 MG: 1 INJECTION INTRAVENOUS at 20:53

## 2019-03-04 RX ADMIN — CEFAZOLIN SODIUM 2000 MG: 1 SOLUTION INTRAVENOUS at 13:34

## 2019-03-04 RX ADMIN — ROCURONIUM BROMIDE 50 MG: 10 INJECTION INTRAVENOUS at 15:40

## 2019-03-04 RX ADMIN — FENTANYL CITRATE 50 MCG: 50 INJECTION, SOLUTION INTRAMUSCULAR; INTRAVENOUS at 13:31
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
## 2019-03-05 ENCOUNTER — APPOINTMENT (INPATIENT)
Dept: RADIOLOGY | Facility: HOSPITAL | Age: 29
DRG: 480 | End: 2019-03-05
Payer: COMMERCIAL

## 2019-03-05 LAB
ANION GAP SERPL CALCULATED.3IONS-SCNC: 7 MMOL/L (ref 4–13)
BUN SERPL-MCNC: 12 MG/DL (ref 5–25)
CALCIUM SERPL-MCNC: 7.7 MG/DL (ref 8.3–10.1)
CHLORIDE SERPL-SCNC: 111 MMOL/L (ref 100–108)
CO2 SERPL-SCNC: 22 MMOL/L (ref 21–32)
CREAT SERPL-MCNC: 0.88 MG/DL (ref 0.6–1.3)
ERYTHROCYTE [DISTWIDTH] IN BLOOD BY AUTOMATED COUNT: 12.6 % (ref 11.6–15.1)
GFR SERPL CREATININE-BSD FRML MDRD: 119 ML/MIN/1.73SQ M
GLUCOSE SERPL-MCNC: 192 MG/DL (ref 65–140)
HCT VFR BLD AUTO: 29.2 % (ref 36.5–49.3)
HGB BLD-MCNC: 9.9 G/DL (ref 12–17)
MCH RBC QN AUTO: 29.1 PG (ref 26.8–34.3)
MCHC RBC AUTO-ENTMCNC: 33.9 G/DL (ref 31.4–37.4)
MCV RBC AUTO: 86 FL (ref 82–98)
PLATELET # BLD AUTO: 185 THOUSANDS/UL (ref 149–390)
PMV BLD AUTO: 9 FL (ref 8.9–12.7)
POTASSIUM SERPL-SCNC: 4 MMOL/L (ref 3.5–5.3)
RBC # BLD AUTO: 3.4 MILLION/UL (ref 3.88–5.62)
SODIUM SERPL-SCNC: 140 MMOL/L (ref 136–145)
WBC # BLD AUTO: 11.38 THOUSAND/UL (ref 4.31–10.16)

## 2019-03-05 PROCEDURE — G8979 MOBILITY GOAL STATUS: HCPCS

## 2019-03-05 PROCEDURE — 73610 X-RAY EXAM OF ANKLE: CPT

## 2019-03-05 PROCEDURE — 99024 POSTOP FOLLOW-UP VISIT: CPT | Performed by: ORTHOPAEDIC SURGERY

## 2019-03-05 PROCEDURE — 85027 COMPLETE CBC AUTOMATED: CPT | Performed by: ORTHOPAEDIC SURGERY

## 2019-03-05 PROCEDURE — G8988 SELF CARE GOAL STATUS: HCPCS

## 2019-03-05 PROCEDURE — G8978 MOBILITY CURRENT STATUS: HCPCS

## 2019-03-05 PROCEDURE — 73630 X-RAY EXAM OF FOOT: CPT

## 2019-03-05 PROCEDURE — 97163 PT EVAL HIGH COMPLEX 45 MIN: CPT

## 2019-03-05 PROCEDURE — 73130 X-RAY EXAM OF HAND: CPT

## 2019-03-05 PROCEDURE — 80048 BASIC METABOLIC PNL TOTAL CA: CPT | Performed by: ORTHOPAEDIC SURGERY

## 2019-03-05 PROCEDURE — 73060 X-RAY EXAM OF HUMERUS: CPT

## 2019-03-05 PROCEDURE — 97167 OT EVAL HIGH COMPLEX 60 MIN: CPT

## 2019-03-05 PROCEDURE — G8987 SELF CARE CURRENT STATUS: HCPCS

## 2019-03-05 PROCEDURE — 99233 SBSQ HOSP IP/OBS HIGH 50: CPT | Performed by: SURGERY

## 2019-03-05 RX ORDER — ACETAMINOPHEN 325 MG/1
650 TABLET ORAL EVERY 8 HOURS PRN
Status: DISCONTINUED | OUTPATIENT
Start: 2019-03-05 | End: 2019-03-10 | Stop reason: HOSPADM

## 2019-03-05 RX ORDER — HYDROMORPHONE HCL/PF 1 MG/ML
0.5 SYRINGE (ML) INJECTION EVERY 2 HOUR PRN
Status: DISCONTINUED | OUTPATIENT
Start: 2019-03-05 | End: 2019-03-10 | Stop reason: HOSPADM

## 2019-03-05 RX ORDER — METHOCARBAMOL 750 MG/1
750 TABLET, FILM COATED ORAL EVERY 6 HOURS PRN
Status: DISCONTINUED | OUTPATIENT
Start: 2019-03-05 | End: 2019-03-05

## 2019-03-05 RX ORDER — ONDANSETRON 2 MG/ML
4 INJECTION INTRAMUSCULAR; INTRAVENOUS EVERY 4 HOURS PRN
Status: DISCONTINUED | OUTPATIENT
Start: 2019-03-05 | End: 2019-03-07

## 2019-03-05 RX ORDER — GABAPENTIN 100 MG/1
100 CAPSULE ORAL 3 TIMES DAILY
Status: DISCONTINUED | OUTPATIENT
Start: 2019-03-05 | End: 2019-03-10 | Stop reason: HOSPADM

## 2019-03-05 RX ORDER — METHOCARBAMOL 750 MG/1
750 TABLET, FILM COATED ORAL EVERY 6 HOURS SCHEDULED
Status: DISCONTINUED | OUTPATIENT
Start: 2019-03-05 | End: 2019-03-10 | Stop reason: HOSPADM

## 2019-03-05 RX ADMIN — ONDANSETRON 4 MG: 2 INJECTION INTRAMUSCULAR; INTRAVENOUS at 06:18

## 2019-03-05 RX ADMIN — OXYCODONE HYDROCHLORIDE 10 MG: 10 TABLET ORAL at 15:22

## 2019-03-05 RX ADMIN — SODIUM CHLORIDE, SODIUM GLUCONATE, SODIUM ACETATE, POTASSIUM CHLORIDE, MAGNESIUM CHLORIDE, SODIUM PHOSPHATE, DIBASIC, AND POTASSIUM PHOSPHATE 125 ML/HR: .53; .5; .37; .037; .03; .012; .00082 INJECTION, SOLUTION INTRAVENOUS at 09:16

## 2019-03-05 RX ADMIN — METHOCARBAMOL 750 MG: 750 TABLET, FILM COATED ORAL at 17:02

## 2019-03-05 RX ADMIN — GABAPENTIN 100 MG: 100 CAPSULE ORAL at 22:24

## 2019-03-05 RX ADMIN — MORPHINE SULFATE 2 MG: 2 INJECTION, SOLUTION INTRAMUSCULAR; INTRAVENOUS at 08:10

## 2019-03-05 RX ADMIN — CEFAZOLIN SODIUM 2000 MG: 10 INJECTION, POWDER, FOR SOLUTION INTRAVENOUS at 08:14

## 2019-03-05 RX ADMIN — OXYCODONE HYDROCHLORIDE 10 MG: 10 TABLET ORAL at 06:19

## 2019-03-05 RX ADMIN — HYDROMORPHONE HYDROCHLORIDE 0.5 MG: 1 INJECTION, SOLUTION INTRAMUSCULAR; INTRAVENOUS; SUBCUTANEOUS at 12:49

## 2019-03-05 RX ADMIN — OXYCODONE HYDROCHLORIDE 10 MG: 10 TABLET ORAL at 22:25

## 2019-03-05 RX ADMIN — MORPHINE SULFATE 2 MG: 2 INJECTION, SOLUTION INTRAMUSCULAR; INTRAVENOUS at 00:52

## 2019-03-05 RX ADMIN — GABAPENTIN 100 MG: 100 CAPSULE ORAL at 17:01

## 2019-03-05 RX ADMIN — OXYCODONE HYDROCHLORIDE 10 MG: 10 TABLET ORAL at 11:08

## 2019-03-05 RX ADMIN — METHOCARBAMOL 750 MG: 750 TABLET, FILM COATED ORAL at 12:54

## 2019-03-05 RX ADMIN — HYDROMORPHONE HYDROCHLORIDE 0.5 MG: 1 INJECTION, SOLUTION INTRAMUSCULAR; INTRAVENOUS; SUBCUTANEOUS at 18:40

## 2019-03-05 RX ADMIN — CEFAZOLIN SODIUM 2000 MG: 10 INJECTION, POWDER, FOR SOLUTION INTRAVENOUS at 17:01

## 2019-03-05 RX ADMIN — ENOXAPARIN SODIUM 40 MG: 40 INJECTION SUBCUTANEOUS at 08:14

## 2019-03-05 RX ADMIN — ACETAMINOPHEN 975 MG: 325 TABLET ORAL at 06:18

## 2019-03-05 RX ADMIN — ENOXAPARIN SODIUM 30 MG: 30 INJECTION SUBCUTANEOUS at 22:24

## 2019-03-05 RX ADMIN — ACETAMINOPHEN 650 MG: 325 TABLET, FILM COATED ORAL at 18:37

## 2019-03-05 RX ADMIN — HYDROMORPHONE HYDROCHLORIDE 0.5 MG: 1 INJECTION, SOLUTION INTRAMUSCULAR; INTRAVENOUS; SUBCUTANEOUS at 09:47

## 2019-03-05 RX ADMIN — MORPHINE SULFATE 2 MG: 2 INJECTION, SOLUTION INTRAMUSCULAR; INTRAVENOUS at 03:05

## 2019-03-05 RX ADMIN — ACETAMINOPHEN 975 MG: 325 TABLET ORAL at 14:30

## 2019-03-05 RX ADMIN — HYDROMORPHONE HYDROCHLORIDE 0.5 MG: 1 INJECTION, SOLUTION INTRAMUSCULAR; INTRAVENOUS; SUBCUTANEOUS at 14:52

## 2019-03-05 NOTE — PROGRESS NOTES
Post-op note    Patient seen and examined post op, patient is complaining of moderate pain at this time, we will adjust his regimen as necessary, otherwise patient has intact motor function to the operative extremity and denies numbness/tingling to the extremity at this time  Thigh and calf remain soft and compressible

## 2019-03-05 NOTE — DISCHARGE INSTRUCTIONS
Discharge Instructions - Orthopedics  Vickie Olivas 34 y o  male MRN: [id number]  Unit/Bed#: Operating Room    Weight Bearing Status:                                           Non weight bearing left lower extremity    DVT prophylaxis:  Complete course of Lovenox as directed    Pain:  Continue analgesics as directed    Dressing Instructions:   Keep dressing clean, dry and intact until follow up appointment  PT/OT:  Continue PT/OT on outpatient basis as directed    Appt Instructions: If you do not have your appointment, please call the clinic at [phone] to f/u with Dr Kang Zamudio in 2 weeks    Contact the office sooner if you experience any increased numbness/tingling in the extremities        Miscellaneous:  None

## 2019-03-05 NOTE — SOCIAL WORK
CM met with pt and his gf Cameron Anderson to discuss the role of CM  Pt lives with his mother in a 2 story home which has 1STE but pt remains on first floor  Pt works, drives, and was fully independent PTA  Pt owns no DME or living will  Pt's pharmacy is [facility] in [place]  Pt reports no hx of mental health, substance abuse, IP rehab, or VNA  Pt's gf will transport  Pt has medical insurance and will bring his cards in  Pt has [employer] for [employer] and has yet to file a claim  CM encouraged pt to file claim as soon as possible  CM reviewed d/c planning process including the following: identifying help at home, patient preference for d/c planning needs, Discharge Lounge, [facility] Meds to Bed program, availability of treatment team to discuss questions or concerns patient and/or family may have regarding understanding medications and recognizing signs and symptoms once discharged  CM also encouraged patient to follow up with all recommended appointments after discharge  Patient advised of importance for patient and family to participate in managing patients medical well being

## 2019-03-05 NOTE — PLAN OF CARE
Problem: Potential for Falls  Goal: Patient will remain free of falls  Description  INTERVENTIONS:  - Assess patient frequently for physical needs  -  Identify cognitive and physical deficits and behaviors that affect risk of falls    -  [place] fall precautions as indicated by assessment   - Educate patient/family on patient safety including physical limitations  - Instruct patient to call for assistance with activity based on assessment  - Modify environment to reduce risk of injury  - Consider OT/PT consult to assist with strengthening/mobility  Outcome: Progressing     Problem: Prexisting or High Potential for Compromised Skin Integrity  Goal: Skin integrity is maintained or improved  Description  INTERVENTIONS:  - Identify patients at risk for skin breakdown  - Assess and monitor skin integrity  - Assess and monitor nutrition and hydration status  - Monitor labs (i e  albumin)  - Assess for incontinence   - Turn and reposition patient  - Assist with mobility/ambulation  - Relieve pressure over bony prominences  - Avoid friction and shearing  - Provide appropriate hygiene as needed including keeping skin clean and dry  - Evaluate need for skin moisturizer/barrier cream  - Collaborate with interdisciplinary team (i e  Nutrition, Rehabilitation, etc )   - Patient/family teaching  Outcome: Progressing     Problem: PAIN - ADULT  Goal: Verbalizes/displays adequate comfort level or baseline comfort level  Description  Interventions:  - Encourage patient to monitor pain and request assistance  - Assess pain using appropriate pain scale  - Administer analgesics based on type and severity of pain and evaluate response  - Implement non-pharmacological measures as appropriate and evaluate response  - Consider cultural and social influences on pain and pain management  - Notify physician/advanced practitioner if interventions unsuccessful or patient reports new pain  Outcome: Progressing     Problem: INFECTION - ADULT  Goal: Absence or prevention of progression during hospitalization  Description  INTERVENTIONS:  - Assess and monitor for signs and symptoms of infection  - Monitor lab/diagnostic results  - Monitor all insertion sites, i e  indwelling lines, tubes, and drains  - Monitor endotracheal (as able) and nasal secretions for changes in amount and color  - [place] appropriate cooling/warming therapies per order  - Administer medications as ordered  - Instruct and encourage patient and family to use good hand hygiene technique  - Identify and instruct in appropriate isolation precautions for identified infection/condition  Outcome: Progressing     Problem: DISCHARGE PLANNING  Goal: Discharge to home or other facility with appropriate resources  Description  INTERVENTIONS:  - Identify barriers to discharge w/patient and caregiver  - Arrange for needed discharge resources and transportation as appropriate  - Identify discharge learning needs (meds, wound care, etc )  - Arrange for interpretive services to assist at discharge as needed  - Refer to Case Management Department for coordinating discharge planning if the patient needs post-hospital services based on physician/advanced practitioner order or complex needs related to functional status, cognitive ability, or social support system  Outcome: Progressing
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
## 2019-03-05 NOTE — PROGRESS NOTES
Progress Note - Amelie Massey 1990, 34 y o  male MRN: [id number]    Unit/Bed#: [facility] 817-01 Encounter: [id number]    Primary Care Provider: Ramakrishna Bro DO   Date and time admitted to hospital: 3/4/2019  1:20 PM        Type I or II open fracture of left tibial plateau  Assessment & Plan  See below    Type I or II open comminuted fracture of left patella  Assessment & Plan  See below    Closed displaced comminuted fracture of shaft of left femur ([address] )  Assessment & Plan  See below    * Closed fracture of neck of left femur (HCC)  Assessment & Plan  NWB LLE for all documented fractures  PT/OT evaluations  CM for disposition planning  Pain control  DVT Ppx              Subjective/Objective   Chief Complaint: left leg pain and R hand pain    Subjective: patient did well overnight, pain has been controlled at an acceptable level overnight, patient was able to eat some of his dinner but hasnt had much of an appetite post op, patient noted some pain to his left hand on tertiary exam today  Objective:     Meds/Allergies   No medications prior to admission  Vitals: Blood pressure 139/64, pulse 79, temperature 99 2 °F (37 3 °C), temperature source Oral, resp  rate 18, height 5' 6" (1 676 m), weight 73 5 kg (162 lb), SpO2 100 %  Body mass index is 26 15 kg/m²   SpO2: SpO2: 100 %, SpO2 Activity: SpO2 Activity: At Rest, SpO2 Device: O2 Device: None (Room air), Capnography:      ABG: No results found for: PHART, ARG7UIZ, PO2ART, HCO2JPS, L8HMUCTO, BEART, SOURCE      Intake/Output Summary (Last 24 hours) at 3/5/2019 0913  Last data filed at 3/5/2019 2672  Gross per 24 hour   Intake 4200 ml   Output 3100 ml   Net 1100 ml       Invasive Devices     Peripheral Intravenous Line            Peripheral IV 03/04/19 Left Antecubital less than 1 day    Peripheral IV 03/04/19 Right Antecubital less than 1 day          Drain            Urethral Catheter Latex 16 Fr  less than 1 day                Nutrition/GI Proph/Bowel Reg: regular    Physical Exam:   GENERAL APPEARANCE: patient appears tired  HEENT: several abrasions noted  CV: regular rate, no murmurs  LUNGS: CTAB  ABD: soft non distended  EXT: LLE in KI and dressed appropriately post op, motor and sensory innervation intact, DP pulse palpable  NEURO: no CN deficits  SKIN: several abrasions throughout his body     Lab Results:   Results: I have personally reviewed pertinent reports  and I have personally reviewed pertinent films in PACS, BMP/CMP:   Lab Results   Component Value Date    SODIUM 140 03/05/2019    K 4 0 03/05/2019     (H) 03/05/2019    CO2 22 03/05/2019    CO2 24 03/04/2019    BUN 12 03/05/2019    CREATININE 0 88 03/05/2019    GLUCOSE 143 (H) 03/04/2019    CALCIUM 7 7 (L) 03/05/2019    AST 98 (H) 03/04/2019     (H) 03/04/2019    ALKPHOS 90 03/04/2019    EGFR 119 03/05/2019   , CBC:   Lab Results   Component Value Date    WBC 11 38 (H) 03/05/2019    HGB 9 9 (L) 03/05/2019    HCT 29 2 (L) 03/05/2019    MCV 86 03/05/2019     03/05/2019    MCH 29 1 03/05/2019    MCHC 33 9 03/05/2019    RDW 12 6 03/05/2019    MPV 9 0 03/05/2019    NRBC 0 03/04/2019   , Coagulation:   Lab Results   Component Value Date    INR 1 06 03/04/2019   , Lactate: No results found for: LACTATE, Amylase: No results found for: AMYLASE, Lipase: No results found for: LIPASE, AST:   Lab Results   Component Value Date    AST 98 (H) 03/04/2019   , ALT:   Lab Results   Component Value Date     (H) 03/04/2019   , Urinalysis: No results found for: Dulcie Bellow, SPECGRAV, PHUR, LEUKOCYTESUR, NITRITE, PROTEINUA, GLUCOSEU, KETONESU, BILIRUBINUR, BLOODU and CK:  No results found for: CKTOTAL

## 2019-03-05 NOTE — OP NOTE
Op Note- Orthopedics   Hilda Burks  MRN: [id number]      Unit/Bed#: Operating Room      PreOp Dx:  Open grade 2 left tibial plateau fractures; left nondisplaced femoral neck fracture; left comminuted femoral shaft fracture; left comminuted patella fracture     Postop Dx:  Same as preoperative diagnosis    Procedure:  Debridement and washout left tibial plateau fracture; operative fixation of left femoral neck fracture; intramedullary nailing of left femoral shaft fractures; operative fixation of left comminuted patella fracture; operative fixation left tibial plateau fracture    Surgeon: Jennifer Brunson    Assistants: Klaudia Lozano MD, Terry Gonzalez MD, Zuri Nelson PA-C, Lennie Murphy PA-C    Fluids:     EBL:     Drains: none    Specimens: none    Complications: none    Condition:  Stable and transferred to postanesthesia care unit    Implant:  Synthes retrograde femoral nail 11 x 320 mm; 7 3 mm and (2) 6 5 mm partially threaded cannulated screws; 2 4 mm mesh plate; 3 5 mm proximal medial tibial plate    [id number] male with injuries as stated before presents at this time for definitive treatment of his pathology  Patient did receive antibiotics in the emergency room prior to operative treatment  The patient was told of all the pros and cons of operative and nonoperative intervention  Some of the complications of operative intervention include infection, bleeding, scarring, nerve injury, vascular injury, malunion, nonunion, continued pain, decreased range of motion, DVT, PE death, loss of limb, need for further surgery, not obtain an results  The patient wished  Patient did consent for operative intervention for this pathology  Patient was brought to the operating room  Patient was anesthetized as anesthesia team  Patient was prepped and draped in normal sterile fashion  After this was done, we did conduct a time out to make sure correct  Patient was in the room, prepped marked and draped   Implants were in the room, Rep  For the implants were present, DVT prophylaxis and antibiotics were dressed  Incision was made to extend the medial proximal leg wound from patient's proximal tibia fracture both proximally and distally  No gross contamination was noted  This was about a 3 cm wound  Some fatty tissue was debrided with the aid of the scalpel  3 L of normal saline was utilized at the open wound site  Once this was done, we then focused on the femoral neck fracture  Incision was made over the lateral proximal thigh region  Three guidewires were placed in inverted triangular formation into the femoral neck/head region  Once appropriate positioning was identified, we did make small incisions through the fascia  We did measure for the appropriate screw lengths, and appropriate screws were placed  This confirmed on both AP and lateral films  Once this was done, a midline incision to the knee was made  After going through skin minimal fatty tissue, flaps were created medially and laterally  Medial parapatellar incision was made which was extended towards the medial aspect of the patella  Guidewire was then advanced making sure we were anterior to Blumensaat's line and wound the appropriate position in the coronal plane  After guidewire was advanced proximally, we able to use our opening Reamer with the aid of radiographic imaging  Once this was done, we able to place a ball-tip guidewire just at the level of the lesser trochanteric region  We did measure for the appropriate length  Reamers were then used subsequently to the appropriate size Reamer for the appropriate size nail which was utilized  Two distal locking screws were placed with the aid of a jig distally  She has 2 proximal locking screws were placed by obtaining perfect circles proximally and placed into A to P screws    Reduction was noted to be appropriate as well as rotation as was judgment prior imaging from patient's unaffected contralateral extremity  Once this was done focus was now on the patella  Patient had a comminuted patella therefore of mesh plate was utilized  A hybrid construct of cortical and locking screws were placed after the reduction was obtained  After the mesh plate was noted to be in appropriate position and all the screws were placed  We did obtain radiographs to make sure that the reduction was in appropriate position and screws were in appropriate place  We will also able to the field and visualize reduction from the under surface after making an incision medially to view to articular surface of the patella  Once this was done copious irrigation was used at all operative sites and incisions were reapproximated with Vicryl sutures and then were reinforced with staples  Once this was accomplished, we now focused back on the proximal tibia fracture which involved the medial and lateral columns bone mostly involving the medial with comminution  Incision was made utilizing the open wound incision which we had noted and debrided initially  After incisions were made through skin and fatty tissue we left the periosteum intact  Medial plate was then placed  We able to get the patient appropriate alignment out of varus  Patient also did have a coronal type injury to tibial tubercle which was fixed with lag by technique screws  Cortical screws were utilized for the proximal medial tibial plate  Reduction was noted to be appropriate on both AP and lateral films  Once this was done, copious irrigation was used at the operative site  PDS and nylon sutures were used to reapproximate the subcu layer and for placement of hard onto mattress sutures respectively  Patient was subsequently cleaned and dried  Acticoat 4 x 4 ABD Tegaderm for the proximal wounds and a Webril and Ace bandage from the foot to the mid thigh region for all other wounds    Patient was subsequently awakened, noted to be in stable condition, transferred to postanesthesia care unit

## 2019-03-05 NOTE — ASSESSMENT & PLAN NOTE
NWB LLE for all documented fractures  PT/OT evaluations  CM for disposition planning  Pain control  DVT Ppx
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
## 2019-03-05 NOTE — PLAN OF CARE
Problem: Potential for Falls  Goal: Patient will remain free of falls  Description  INTERVENTIONS:  - Assess patient frequently for physical needs  -  Identify cognitive and physical deficits and behaviors that affect risk of falls    -  [place] fall precautions as indicated by assessment   - Educate patient/family on patient safety including physical limitations  - Instruct patient to call for assistance with activity based on assessment  - Modify environment to reduce risk of injury  - Consider OT/PT consult to assist with strengthening/mobility  Outcome: Progressing     Problem: Prexisting or High Potential for Compromised Skin Integrity  Goal: Skin integrity is maintained or improved  Description  INTERVENTIONS:  - Identify patients at risk for skin breakdown  - Assess and monitor skin integrity  - Assess and monitor nutrition and hydration status  - Monitor labs (i e  albumin)  - Assess for incontinence   - Turn and reposition patient  - Assist with mobility/ambulation  - Relieve pressure over bony prominences  - Avoid friction and shearing  - Provide appropriate hygiene as needed including keeping skin clean and dry  - Evaluate need for skin moisturizer/barrier cream  - Collaborate with interdisciplinary team (i e  Nutrition, Rehabilitation, etc )   - Patient/family teaching  Outcome: Progressing     Problem: PAIN - ADULT  Goal: Verbalizes/displays adequate comfort level or baseline comfort level  Description  Interventions:  - Encourage patient to monitor pain and request assistance  - Assess pain using appropriate pain scale  - Administer analgesics based on type and severity of pain and evaluate response  - Implement non-pharmacological measures as appropriate and evaluate response  - Consider cultural and social influences on pain and pain management  - Notify physician/advanced practitioner if interventions unsuccessful or patient reports new pain  Outcome: Progressing     Problem: INFECTION - ADULT  Goal: Absence or prevention of progression during hospitalization  Description  INTERVENTIONS:  - Assess and monitor for signs and symptoms of infection  - Monitor lab/diagnostic results  - Monitor all insertion sites, i e  indwelling lines, tubes, and drains  - Monitor endotracheal (as able) and nasal secretions for changes in amount and color  - [place] appropriate cooling/warming therapies per order  - Administer medications as ordered  - Instruct and encourage patient and family to use good hand hygiene technique  - Identify and instruct in appropriate isolation precautions for identified infection/condition  Outcome: Progressing     Problem: DISCHARGE PLANNING  Goal: Discharge to home or other facility with appropriate resources  Description  INTERVENTIONS:  - Identify barriers to discharge w/patient and caregiver  - Arrange for needed discharge resources and transportation as appropriate  - Identify discharge learning needs (meds, wound care, etc )  - Arrange for interpretive services to assist at discharge as needed  - Refer to Case Management Department for coordinating discharge planning if the patient needs post-hospital services based on physician/advanced practitioner order or complex needs related to functional status, cognitive ability, or social support system  Outcome: Progressing     Problem: Nutrition/Hydration-ADULT  Goal: Nutrient/Hydration intake appropriate for improving, restoring or maintaining nutritional needs  Description  Monitor and assess patient's nutrition/hydration status for malnutrition (ex- brittle hair, bruises, dry skin, pale skin and conjunctiva, muscle wasting, smooth red tongue, and disorientation)  Collaborate with interdisciplinary team and initiate plan and interventions as ordered  Monitor patient's weight and dietary intake as ordered or per policy  Utilize nutrition screening tool and intervene per policy   Determine patient's food preferences and provide high-protein, high-caloric foods as appropriate       INTERVENTIONS:  - Monitor oral intake, urinary output, labs, and treatment plans  - Assess nutrition and hydration status and recommend course of action  - Evaluate amount of meals eaten  - Assist patient with eating if necessary   - Allow adequate time for meals  - Recommend/ encourage appropriate diets, oral nutritional supplements, and vitamin/mineral supplements  - Order, calculate, and assess calorie counts as needed  - Recommend, monitor, and adjust tube feedings and TPN/PPN based on assessed needs  - Assess need for intravenous fluids  - Provide specific nutrition/hydration education as appropriate  - Include patient/family/caregiver in decisions related to nutrition  Outcome: Progressing     Problem: DISCHARGE PLANNING - CARE MANAGEMENT  Goal: Discharge to post-acute care or home with appropriate resources  Description  INTERVENTIONS:  - Conduct assessment to determine patient/family and health care team treatment goals, and need for post-acute services based on payer coverage, community resources, and patient preferences, and barriers to discharge  - Address psychosocial, clinical, and financial barriers to discharge as identified in assessment in conjunction with the patient/family and health care team  - Arrange appropriate level of post-acute services according to patient?s   needs and preference and payer coverage in collaboration with the physician and health care team  - Communicate with and update the patient/family, physician, and health care team regarding progress on the discharge plan  - Arrange appropriate transportation to post-acute venues  Outcome: Progressing

## 2019-03-05 NOTE — ANESTHESIA POSTPROCEDURE EVALUATION
Post-Op Assessment Note    CV Status:  Stable    Pain management: adequate     Mental Status:  Awake and arousable   Hydration Status:  Euvolemic   PONV Controlled:  Controlled   Airway Patency:  Patent   Post Op Vitals Reviewed: Yes      Staff: CRNA           BP   134/81   Temp (P) 98 °F (36 7 °C) (03/04/19 2123)    Pulse (P) 92 (03/04/19 2123)   Resp   16   SpO2 (P) 100 % (03/04/19 2123)
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
## 2019-03-05 NOTE — RESTORATIVE TECHNICIAN NOTE
Restorative Specialist Mobility Note       Activity: Stand at bedside, Turn(Back to bed   Pt left supine in bed with call bell, phone, and tray within reach )     Assistive Device: Four wheel walker        Repositioned: Supine              Anti-Embolism Device On: Right lower extremity, Sequential compression devices, below knee

## 2019-03-05 NOTE — PROGRESS NOTES
Subjective: No acute events overnight  No acute distress  Pain fairly well controlled  Patient has continued to complain of some right hand paresthesias    Left lower extremity  Dressing c/d/i  Knee immobilizer in place  Motor & sensation grossly intact to foot and ankle  Some tenderness appreciated about the dorsum of the foot  Musculature of the lower leg soft and compressible  Toes warm and well perfused    Right upper extremity  Skin intact  Motor and sensation intact to wrist, hand and elbow  Hand warm and well-perfused    Assessment: Post op from ORIF of the left hip, left open tibial plateau with washout and left patella as well as left femoral retrograde IM nail    Plan: Nonweightbearing left lower extremity  Follow-up left foot films  Continue Ancef times 48 hours  Pain control  DVT ppx:  Lovenox, mechanical  Follow-up a m   Labs  PT  Dispo:  Ortho will continue to follow    Objective:  Lab Results   Component Value Date/Time    WBC 11 38 (H) 03/05/2019 12:02 AM    HGB 9 9 (L) 03/05/2019 12:02 AM       Vitals:    03/04/19 2251   BP: 101/58   Pulse: 82   Resp: 16   Temp: 99 °F (37 2 °C)   SpO2: 100%

## 2019-03-05 NOTE — PROGRESS NOTES
Patient 9/10 in his left knee and not able to get pain medication for another hour  I spoke with Juanita Cannon from trauma and he is on the floor at the moment  He will assess patient and give me an update

## 2019-03-05 NOTE — PLAN OF CARE
Problem: Potential for Falls  Goal: Patient will remain free of falls  Description  INTERVENTIONS:  - Assess patient frequently for physical needs  -  Identify cognitive and physical deficits and behaviors that affect risk of falls    -  [place] fall precautions as indicated by assessment   - Educate patient/family on patient safety including physical limitations  - Instruct patient to call for assistance with activity based on assessment  - Modify environment to reduce risk of injury  - Consider OT/PT consult to assist with strengthening/mobility  Outcome: Progressing     Problem: Prexisting or High Potential for Compromised Skin Integrity  Goal: Skin integrity is maintained or improved  Description  INTERVENTIONS:  - Identify patients at risk for skin breakdown  - Assess and monitor skin integrity  - Assess and monitor nutrition and hydration status  - Monitor labs (i e  albumin)  - Assess for incontinence   - Turn and reposition patient  - Assist with mobility/ambulation  - Relieve pressure over bony prominences  - Avoid friction and shearing  - Provide appropriate hygiene as needed including keeping skin clean and dry  - Evaluate need for skin moisturizer/barrier cream  - Collaborate with interdisciplinary team (i e  Nutrition, Rehabilitation, etc )   - Patient/family teaching  Outcome: Progressing     Problem: PAIN - ADULT  Goal: Verbalizes/displays adequate comfort level or baseline comfort level  Description  Interventions:  - Encourage patient to monitor pain and request assistance  - Assess pain using appropriate pain scale  - Administer analgesics based on type and severity of pain and evaluate response  - Implement non-pharmacological measures as appropriate and evaluate response  - Consider cultural and social influences on pain and pain management  - Notify physician/advanced practitioner if interventions unsuccessful or patient reports new pain  Outcome: Progressing     Problem: INFECTION - ADULT  Goal: Absence or prevention of progression during hospitalization  Description  INTERVENTIONS:  - Assess and monitor for signs and symptoms of infection  - Monitor lab/diagnostic results  - Monitor all insertion sites, i e  indwelling lines, tubes, and drains  - Monitor endotracheal (as able) and nasal secretions for changes in amount and color  - [place] appropriate cooling/warming therapies per order  - Administer medications as ordered  - Instruct and encourage patient and family to use good hand hygiene technique  - Identify and instruct in appropriate isolation precautions for identified infection/condition  Outcome: Progressing     Problem: DISCHARGE PLANNING  Goal: Discharge to home or other facility with appropriate resources  Description  INTERVENTIONS:  - Identify barriers to discharge w/patient and caregiver  - Arrange for needed discharge resources and transportation as appropriate  - Identify discharge learning needs (meds, wound care, etc )  - Arrange for interpretive services to assist at discharge as needed  - Refer to Case Management Department for coordinating discharge planning if the patient needs post-hospital services based on physician/advanced practitioner order or complex needs related to functional status, cognitive ability, or social support system  Outcome: Progressing     Problem: Nutrition/Hydration-ADULT  Goal: Nutrient/Hydration intake appropriate for improving, restoring or maintaining nutritional needs  Description  Monitor and assess patient's nutrition/hydration status for malnutrition (ex- brittle hair, bruises, dry skin, pale skin and conjunctiva, muscle wasting, smooth red tongue, and disorientation)  Collaborate with interdisciplinary team and initiate plan and interventions as ordered  Monitor patient's weight and dietary intake as ordered or per policy  Utilize nutrition screening tool and intervene per policy   Determine patient's food preferences and provide high-protein, high-caloric foods as appropriate       INTERVENTIONS:  - Monitor oral intake, urinary output, labs, and treatment plans  - Assess nutrition and hydration status and recommend course of action  - Evaluate amount of meals eaten  - Assist patient with eating if necessary   - Allow adequate time for meals  - Recommend/ encourage appropriate diets, oral nutritional supplements, and vitamin/mineral supplements  - Order, calculate, and assess calorie counts as needed  - Recommend, monitor, and adjust tube feedings and TPN/PPN based on assessed needs  - Assess need for intravenous fluids  - Provide specific nutrition/hydration education as appropriate  - Include patient/family/caregiver in decisions related to nutrition  Outcome: Progressing

## 2019-03-06 PROCEDURE — 99232 SBSQ HOSP IP/OBS MODERATE 35: CPT | Performed by: SURGERY

## 2019-03-06 PROCEDURE — 97530 THERAPEUTIC ACTIVITIES: CPT

## 2019-03-06 PROCEDURE — 97116 GAIT TRAINING THERAPY: CPT

## 2019-03-06 PROCEDURE — 97535 SELF CARE MNGMENT TRAINING: CPT

## 2019-03-06 RX ADMIN — ENOXAPARIN SODIUM 30 MG: 30 INJECTION SUBCUTANEOUS at 09:19

## 2019-03-06 RX ADMIN — METHOCARBAMOL 750 MG: 750 TABLET, FILM COATED ORAL at 11:48

## 2019-03-06 RX ADMIN — GABAPENTIN 100 MG: 100 CAPSULE ORAL at 16:39

## 2019-03-06 RX ADMIN — OXYCODONE HYDROCHLORIDE 10 MG: 10 TABLET ORAL at 03:21

## 2019-03-06 RX ADMIN — OXYCODONE HYDROCHLORIDE 10 MG: 10 TABLET ORAL at 07:35

## 2019-03-06 RX ADMIN — OXYCODONE HYDROCHLORIDE 10 MG: 10 TABLET ORAL at 11:48

## 2019-03-06 RX ADMIN — CEFAZOLIN SODIUM 2000 MG: 10 INJECTION, POWDER, FOR SOLUTION INTRAVENOUS at 16:40

## 2019-03-06 RX ADMIN — GABAPENTIN 100 MG: 100 CAPSULE ORAL at 09:19

## 2019-03-06 RX ADMIN — CEFAZOLIN SODIUM 2000 MG: 10 INJECTION, POWDER, FOR SOLUTION INTRAVENOUS at 09:25

## 2019-03-06 RX ADMIN — METHOCARBAMOL 750 MG: 750 TABLET, FILM COATED ORAL at 17:34

## 2019-03-06 RX ADMIN — HYDROMORPHONE HYDROCHLORIDE 0.5 MG: 1 INJECTION, SOLUTION INTRAMUSCULAR; INTRAVENOUS; SUBCUTANEOUS at 23:16

## 2019-03-06 RX ADMIN — HYDROMORPHONE HYDROCHLORIDE 0.5 MG: 1 INJECTION, SOLUTION INTRAMUSCULAR; INTRAVENOUS; SUBCUTANEOUS at 19:21

## 2019-03-06 RX ADMIN — GABAPENTIN 100 MG: 100 CAPSULE ORAL at 20:28

## 2019-03-06 RX ADMIN — METHOCARBAMOL 750 MG: 750 TABLET, FILM COATED ORAL at 02:13

## 2019-03-06 RX ADMIN — OXYCODONE HYDROCHLORIDE 10 MG: 10 TABLET ORAL at 17:47

## 2019-03-06 RX ADMIN — ACETAMINOPHEN 650 MG: 325 TABLET, FILM COATED ORAL at 13:21

## 2019-03-06 RX ADMIN — ENOXAPARIN SODIUM 30 MG: 30 INJECTION SUBCUTANEOUS at 20:28

## 2019-03-06 RX ADMIN — ACETAMINOPHEN 650 MG: 325 TABLET, FILM COATED ORAL at 19:18

## 2019-03-06 RX ADMIN — CEFAZOLIN SODIUM 2000 MG: 10 INJECTION, POWDER, FOR SOLUTION INTRAVENOUS at 02:13

## 2019-03-06 RX ADMIN — HYDROMORPHONE HYDROCHLORIDE 0.5 MG: 1 INJECTION, SOLUTION INTRAMUSCULAR; INTRAVENOUS; SUBCUTANEOUS at 09:17

## 2019-03-06 RX ADMIN — METHOCARBAMOL 750 MG: 750 TABLET, FILM COATED ORAL at 06:23

## 2019-03-06 RX ADMIN — OXYCODONE HYDROCHLORIDE 10 MG: 10 TABLET ORAL at 22:06

## 2019-03-06 NOTE — SOCIAL WORK
Cm met with patient and patient's mom at bedside to gather insurance information  VANNA Gloria assisted in making copies of patient's insurance cards  Jair faxed insurance information to Dev at  and placed copy in medical records bin  Insurance cards returned to patient's mom  Patient's mom stated she does not have claim information but patient's sister does and will be at [facility] tomorrow  Fax number provided to patient's mom to relay to patient's sister if able to fax claim information today  Patient's auto insurance is through [place]  Awaiting additional claim information  Awaiting PT/OT recommendations for discharge plan  Jair following

## 2019-03-06 NOTE — PHYSICAL THERAPY NOTE
Physical Therapy Progress Note         03/06/19 1114   Pain Assessment   Pain Assessment 0-10   Pain Score Worst Possible Pain   Pain Location Leg;Knee   Pain Orientation Left   Restrictions/Precautions   Weight Bearing Precautions Per Order Yes   LLE Weight Bearing Per Order NWB   Braces or Orthoses LE Immobilizer   Other Precautions WBS; Fall Risk;Pain   General   Chart Reviewed Yes   Family/Caregiver Present Yes  (mom and GF)   Cognition   Overall Cognitive Status WFL   Subjective   Subjective Agreeable to PT session relunctantly  Pt  reported he is in much pain and he has not passed urine  RN reported scan showed only 95 ml and reported patient needs to drink more fluids  KI on LLE on and patient able to recall NWB of LLE   Bed Mobility   Supine to Sit 3  Moderate assistance   Additional items Assist x 1;LE management; Increased time required;HOB elevated; Bedrails; Comment  (Pt  refused to have HOB flat)   Sit to Supine 3  Moderate assistance   Additional items Assist x 1; Increased time required;LE management;Verbal cues   Transfers   Sit to Stand 4  Minimal assistance   Additional items Assist x 1;Assist x 2; Increased time required;Verbal cues;Armrests  (A varied from 2-1)   Stand to Sit 4  Minimal assistance   Additional items Assist x 1;Assist x 2; Increased time required;Verbal cues;Armrests  (A varied 2-1)   Stand pivot 4  Minimal assistance   Additional items Assist x 1; Increased time required;Verbal cues   Ambulation/Elevation   Gait pattern   (Hopping)   Gait Assistance 4  Minimal assist   Additional items Assist x 1;Verbal cues   Assistive Device Rolling walker   Distance 15ft x 2, 12   Curbs 8inch foot stool with RW and Min A/CGA   Balance   Static Sitting Fair   Ambulatory Fair -   Endurance Deficit   Endurance Deficit Yes   Endurance Deficit Description pain, Fatigue   Activity Tolerance   Activity Tolerance Patient tolerated treatment well;Patient limited by pain   Nurse Made Aware Yes   Assessment Prognosis Good   Problem List Decreased strength;Decreased endurance; Impaired balance;Decreased mobility; Impaired judgement;Pain;Orthopedic restrictions   Assessment Pt  progressing well with mobility and needed less assist for all levels of mobility  Pt's mother and GF present t/o session  Pt  tend to call his mother for help and mother too concerned and eager to help  Demonstarted to patient muriel use bedsheet as sling to move LLE in bed  Also demonstrated to negotiate curb step using RW and 8inch foot stool ascending and descending forward  pt  able to perform it with no incident and CGA  Pt  and mother reported the 1 step to enter the house is wide ienough to place RW  Pt  able to ambulate with RW and Min A x/CGA/CS and able to maintain NWB of LLE  Pt  was transferred to raised toilet seat with Min A /CGA/CS  Pt  needed A in LE management for supine to sit and back transfers and Min A x 2/ CGA x 1 and CS x 1  Pt  needed cueing for techniques  Once in bed patient threw up however he felt better after  Educated patient and family for safe mobility  Continue to recommend home PT and home with family support at d/c    Barriers to Discharge None   Goals   Patient Goals None reported   STG Expiration Date 03/15/19   Treatment Day 2   Plan   Treatment/Interventions Functional transfer training;Elevations; Patient/family training;Equipment eval/education; Bed mobility;Gait training;Spoke to nursing;Family   Progress Progressing toward goals   PT Frequency Other (Comment)   Recommendation   Recommendation Home PT; Home with family support   Equipment Recommended Other (Comment); Walker  (RW)   PT - OK to Discharge Yes     Natividad Ren, PTA

## 2019-03-06 NOTE — SOCIAL WORK
Claim information received from patient's sister Gabby Workman:  Claim #: [id number]  Adjustor: Frankie Johnson  Contact Information: [phone]    Cm faxed to [facility] and placed copy in medical records bin  PT/OT recommending home therapy, RW, and commode upon discharge  Patient was asleep in room and Cm discussed with patient's sister  Patient's sister stated patient will need DME as he does not have any at home  [facility] referral placed for RW and commode  Patient's sister requesting [facility] referral be placed  Cm placed referral and awaiting determination

## 2019-03-06 NOTE — PROGRESS NOTES
Subjective: No acute events overnight  No acute distress  Pain better controlled this morning than yesterday  Was able to work with physical therapy yesterday  Left lower extremity  Dressing c/d/i in knee immobilizer  Leg musculature soft and compressible  Motor & sensation grossly intact to foot and ankle  Foot warm and well perfused    Assessment: Post op from left hip pinning, retrograde femoral IMN, ORIF patella and ORIF open tibial plateau fracture with washout   POD #2    Plan:  NWB LLE  Tertiary films are negative for other fractures or dislocations  Check AM labs  Pain control  DVT ppx: Lov and mechanical  PT  Dispo: Ortho stable    Objective:  Lab Results   Component Value Date/Time    WBC 11 38 (H) 03/05/2019 12:02 AM    HGB 9 9 (L) 03/05/2019 12:02 AM       Vitals:    03/05/19 2215   BP: 111/55   Pulse: (!) 121   Resp: 18   Temp: (!) 100 7 °F (38 2 °C)   SpO2: 90%

## 2019-03-06 NOTE — PLAN OF CARE
Problem: OCCUPATIONAL THERAPY ADULT  Goal: Performs self-care activities at highest level of function for planned discharge setting  See evaluation for individualized goals  Description  Treatment Interventions: ADL retraining, Functional transfer training, Endurance training, Patient/family training, Equipment evaluation/education, Compensatory technique education, Continued evaluation, Activityengagement, Energy conservation          See flowsheet documentation for full assessment, interventions and recommendations  Outcome: Progressing  Note:   Limitation: Decreased ADL status, Decreased Safe judgement during ADL, Decreased endurance, Decreased self-care trans, Decreased high-level ADLs, Decreased fine motor control  Prognosis: Fair  Assessment: Patient participated in Skilled OT session this date with interventions consisting of ADL re-training, functional mobility/transfers, education on precautions  Patient agreeable to OT treatment session, upon arrival patient was found supine in bed  In comparison to previous session, patient with improvements in functional transfers/mobility  Pt requiring CGA for steadying during standing toileting (pt attempted to urinate, catheter was removed earlier in day; pt unable to urinate - RN aware)  Pt able to transfer to commode w/ MIN A and perform supine to sit w/ MIN A and use of sheet as leg   Pt able to adhere to LLE NWB precautions w/o cueing throughout session  Patient continues to be functioning below baseline level, occupational performance remains limited secondary to factors listed above and increased risk for falls and injury  From OT standpoint, recommendation at time of d/c would be Home OT/home with family support  Pt reports a family member will be home with him at all times to assist as needed  Recommend commode for increased ADL safety/independence   Patient to benefit from continued Occupational Therapy treatment while in the hospital to address deficits as defined above and maximize level of functional independence with ADLs and functional mobility        OT Discharge Recommendation: Home OT(& home w/ family support)  OT - OK to Discharge: No(d/c pending progress and better pain control)

## 2019-03-06 NOTE — PLAN OF CARE
Problem: PHYSICAL THERAPY ADULT  Goal: Performs mobility at highest level of function for planned discharge setting  See evaluation for individualized goals  Description  Treatment/Interventions: Functional transfer training, LE strengthening/ROM, Elevations, Therapeutic exercise, Endurance training, Patient/family training, Equipment eval/education, Bed mobility, Gait training, Spoke to nursing, OT, Family  Equipment Recommended: Walker(RW)       See flowsheet documentation for full assessment, interventions and recommendations  Outcome: Progressing  Note:   Prognosis: Good  Problem List: Decreased strength, Decreased endurance, Impaired balance, Decreased mobility, Impaired judgement, Pain, Orthopedic restrictions  Assessment: Pt  progressing well with mobility and needed less assist for all levels of mobility  Pt's mother and GF present t/o session  Pt  tend to call his mother for help and mother too concerned and eager to help  Demonstarted to patient muriel use bedsheet as sling to move LLE in bed  Also demonstrated to negotiate curb step using RW and 8inch foot stool ascending and descending forward  pt  able to perform it with no incident and CGA  Pt  and mother reported the 1 step to enter the house is wide ienough to place RW  Pt  able to ambulate with RW and Min A x/CGA/CS and able to maintain NWB of LLE  Pt  was transferred to raised toilet seat with Min A /CGA/CS  Pt  needed A in LE management for supine to sit and back transfers and Min A x 2/ CGA x 1 and CS x 1  Pt  needed cueing for techniques  Once in bed patient threw up however he felt better after  Educated patient and family for safe mobility  Continue to recommend home PT and home with family support at d/c   Barriers to Discharge: None  Barriers to Discharge Comments: 1 DAVE and 2 story home  Recommendation: Home PT, Home with family support     PT - OK to Discharge: Yes    See flowsheet documentation for full assessment

## 2019-03-06 NOTE — PLAN OF CARE
Problem: Potential for Falls  Goal: Patient will remain free of falls  Description  INTERVENTIONS:  - Assess patient frequently for physical needs  -  Identify cognitive and physical deficits and behaviors that affect risk of falls    -  [place] fall precautions as indicated by assessment   - Educate patient/family on patient safety including physical limitations  - Instruct patient to call for assistance with activity based on assessment  - Modify environment to reduce risk of injury  - Consider OT/PT consult to assist with strengthening/mobility  Outcome: Progressing     Problem: Prexisting or High Potential for Compromised Skin Integrity  Goal: Skin integrity is maintained or improved  Description  INTERVENTIONS:  - Identify patients at risk for skin breakdown  - Assess and monitor skin integrity  - Assess and monitor nutrition and hydration status  - Monitor labs (i e  albumin)  - Assess for incontinence   - Turn and reposition patient  - Assist with mobility/ambulation  - Relieve pressure over bony prominences  - Avoid friction and shearing  - Provide appropriate hygiene as needed including keeping skin clean and dry  - Evaluate need for skin moisturizer/barrier cream  - Collaborate with interdisciplinary team (i e  Nutrition, Rehabilitation, etc )   - Patient/family teaching  Outcome: Progressing     Problem: PAIN - ADULT  Goal: Verbalizes/displays adequate comfort level or baseline comfort level  Description  Interventions:  - Encourage patient to monitor pain and request assistance  - Assess pain using appropriate pain scale  - Administer analgesics based on type and severity of pain and evaluate response  - Implement non-pharmacological measures as appropriate and evaluate response  - Consider cultural and social influences on pain and pain management  - Notify physician/advanced practitioner if interventions unsuccessful or patient reports new pain  Outcome: Progressing     Problem: INFECTION - ADULT  Goal: Absence or prevention of progression during hospitalization  Description  INTERVENTIONS:  - Assess and monitor for signs and symptoms of infection  - Monitor lab/diagnostic results  - Monitor all insertion sites, i e  indwelling lines, tubes, and drains  - Monitor endotracheal (as able) and nasal secretions for changes in amount and color  - [place] appropriate cooling/warming therapies per order  - Administer medications as ordered  - Instruct and encourage patient and family to use good hand hygiene technique  - Identify and instruct in appropriate isolation precautions for identified infection/condition  Outcome: Progressing     Problem: DISCHARGE PLANNING  Goal: Discharge to home or other facility with appropriate resources  Description  INTERVENTIONS:  - Identify barriers to discharge w/patient and caregiver  - Arrange for needed discharge resources and transportation as appropriate  - Identify discharge learning needs (meds, wound care, etc )  - Arrange for interpretive services to assist at discharge as needed  - Refer to Case Management Department for coordinating discharge planning if the patient needs post-hospital services based on physician/advanced practitioner order or complex needs related to functional status, cognitive ability, or social support system  Outcome: Progressing     Problem: Nutrition/Hydration-ADULT  Goal: Nutrient/Hydration intake appropriate for improving, restoring or maintaining nutritional needs  Description  Monitor and assess patient's nutrition/hydration status for malnutrition (ex- brittle hair, bruises, dry skin, pale skin and conjunctiva, muscle wasting, smooth red tongue, and disorientation)  Collaborate with interdisciplinary team and initiate plan and interventions as ordered  Monitor patient's weight and dietary intake as ordered or per policy  Utilize nutrition screening tool and intervene per policy   Determine patient's food preferences and provide high-protein, high-caloric foods as appropriate       INTERVENTIONS:  - Monitor oral intake, urinary output, labs, and treatment plans  - Assess nutrition and hydration status and recommend course of action  - Evaluate amount of meals eaten  - Assist patient with eating if necessary   - Allow adequate time for meals  - Recommend/ encourage appropriate diets, oral nutritional supplements, and vitamin/mineral supplements  - Order, calculate, and assess calorie counts as needed  - Recommend, monitor, and adjust tube feedings and TPN/PPN based on assessed needs  - Assess need for intravenous fluids  - Provide specific nutrition/hydration education as appropriate  - Include patient/family/caregiver in decisions related to nutrition  Outcome: Progressing     Problem: DISCHARGE PLANNING - CARE MANAGEMENT  Goal: Discharge to post-acute care or home with appropriate resources  Description  INTERVENTIONS:  - Conduct assessment to determine patient/family and health care team treatment goals, and need for post-acute services based on payer coverage, community resources, and patient preferences, and barriers to discharge  - Address psychosocial, clinical, and financial barriers to discharge as identified in assessment in conjunction with the patient/family and health care team  - Arrange appropriate level of post-acute services according to patient?s   needs and preference and payer coverage in collaboration with the physician and health care team  - Communicate with and update the patient/family, physician, and health care team regarding progress on the discharge plan  - Arrange appropriate transportation to post-acute venues  Outcome: Progressing

## 2019-03-06 NOTE — PROGRESS NOTES
Progress Note - Irvin Plaster 1990, 34 y o  male MRN: [id number]    Unit/Bed#: [facility] 817-01 Encounter: [id number]    Primary Care Provider: Ivone Fitzpatrick DO   Date and time admitted to hospital: 3/4/2019  1:20 PM        Type I or II open fracture of left tibial plateau  Assessment & Plan  See below    Type I or II open comminuted fracture of left patella  Assessment & Plan  Continue Ancef q8h for total of 48 hrs  Consider extending treatment as patient was febrile overnight    Closed displaced comminuted fracture of shaft of left femur ([address] )  Assessment & Plan  See below    * Closed fracture of neck of left femur (HCC)  Assessment & Plan  NWB LLE for all documented fractures  PT/OT following; recommend d/c home in 1-2 days  CM for disposition planning  Pain control  DVT Ppx            Subjective/Objective   Chief Complaint:     Subjective: Patient febrile to 101 3  Fever resolved with Tylenol  One episode of urinary retention  Retention protocol initiated  Tachycardic up to 121  Resolved with tylenol/dilaudid  Objective:     Meds/Allergies   No medications prior to admission  Vitals: Blood pressure 126/61, pulse (!) 110, temperature 97 6 °F (36 4 °C), temperature source Oral, resp  rate 18, height 5' 6" (1 676 m), weight 73 5 kg (162 lb), SpO2 93 %  Body mass index is 26 15 kg/m²  SpO2: SpO2: 93 %    ABG: No results found for: PHART, JBG5BCM, PO2ART, MKX9PMH, J5YOWHSE, BEART, SOURCE      Intake/Output Summary (Last 24 hours) at 3/6/2019 0123  Last data filed at 3/6/2019 0800  Gross per 24 hour   Intake 439 58 ml   Output 775 ml   Net -335 42 ml       Invasive Devices     Peripheral Intravenous Line            Peripheral IV 03/04/19 Right Antecubital 1 day                Nutrition/GI Proph/Bowel Reg: Regular house diet    Physical Exam:   Physical Exam   Constitutional: He is oriented to person, place, and time  He appears well-developed and well-nourished  No distress     HENT:   Head: Normocephalic and atraumatic  Eyes: Pupils are equal, round, and reactive to light  Conjunctivae and EOM are normal  No scleral icterus  Neck: Normal range of motion  Neck supple  Cardiovascular: Normal rate, regular rhythm and normal heart sounds  No murmur heard  Pulmonary/Chest: Effort normal and breath sounds normal  No respiratory distress  He has no wheezes  Abdominal: Soft  Bowel sounds are normal  He exhibits no distension  There is no tenderness  Musculoskeletal: He exhibits tenderness  He exhibits no edema  LLE immobilized  Neuro intact  Equal, intact distal pulses   Neurological: He is alert and oriented to person, place, and time  No sensory deficit  Skin: Skin is warm and dry  No rash noted  He is not diaphoretic  Psychiatric: He has a normal mood and affect  His behavior is normal    Nursing note and vitals reviewed          Lab Results: BMP/CMP: No results found for: SODIUM, K, CL, CO2, ANIONGAP, BUN, CREATININE, GLUCOSE, CALCIUM, AST, ALT, ALKPHOS, PROT, BILITOT, EGFR and CBC: No results found for: WBC, HGB, HCT, MCV, PLT, ADJUSTEDWBC, MCH, MCHC, RDW, MPV, NRBC  Imaging/EKG Studies: Results: I have personally reviewed pertinent films in PACS  Other Studies:   VTE Prophylaxis: Lovenox/SCDs    Bedside rounds completed with nurse Juarez Whalen

## 2019-03-06 NOTE — ASSESSMENT & PLAN NOTE
Continue Ancef q8h for total of 48 hrs  Consider extending treatment as patient was febrile overnight

## 2019-03-06 NOTE — ASSESSMENT & PLAN NOTE
NWB LLE for all documented fractures  PT/OT following; recommend d/c home in 1-2 days  CM for disposition planning  Pain control  DVT Ppx

## 2019-03-06 NOTE — OCCUPATIONAL THERAPY NOTE
[address] Progress Note     Patient Name: Nasra Reilly  Today's Date: 3/6/2019  Problem List  Patient Active Problem List   Diagnosis    Closed fracture of neck of left femur ([address] )    Closed displaced comminuted fracture of shaft of left femur (HCC)    Type I or II open comminuted fracture of left patella    Type I or II open fracture of left tibial plateau           11/67/88 1255   Restrictions/Precautions   Weight Bearing Precautions Per Order Yes   LLE Weight Bearing Per Order NWB   Braces or Orthoses LE Immobilizer  (LLE)   Other Precautions Fall Risk;Pain   General   Response to Previous Treatment Patient with no complaints from previous session   Pain Assessment   Pain Assessment 0-10   Pain Score Worst Possible Pain   Pain Location Leg;Knee   Pain Orientation Left   Patient's Stated Pain Goal No pain   Hospital Pain Intervention(s) Ambulation/increased activity;Repositioned;Distraction   Response to Interventions tolerated   ADL   UB Dressing Assistance 6  Modified independent   UB Dressing Deficit Increased time to complete   UB Dressing Comments hospital gown   LB Dressing Assistance 3  Moderate Assistance   LB Dressing Deficit Don/doff L sock   LB Dressing Comments pt able to ally/doff R sock seated EOB; assist for L 2' pain and decreased mobility   Toileting Assistance  4  Minimal Assistance   Toileting Deficit Steadying;Supervison/safety   Toileting Comments standing at toilet with unilateral UE support; able to manage clothing with CGA   Bed Mobility   Supine to Sit 4  Minimal assistance   Additional items Assist x 1;HOB elevated; Increased time required  (use of sheet as leg  for LLE)   Sit to Supine 4  Minimal assistance   Additional items Assist x 1;HOB elevated; Increased time required   Transfers   Sit to Stand 4  Minimal assistance   Additional items Assist x 1; Increased time required   Stand to Sit 4  Minimal assistance   Additional items Assist x 1; Increased time required   Toilet transfer 4  Minimal assistance   Additional items Assist x 1; Armrests; Increased time required;Commode  (commode over toilet)   Functional Mobility   Functional Mobility 4  Minimal assistance   Additional Comments w/in room and bathroom   Additional items Rolling walker   Cognition   Overall Cognitive Status WFL   Arousal/Participation Alert; Cooperative   Activity Tolerance   Activity Tolerance Patient limited by pain   Medical Staff Made Aware Spoke to RN - pt appropriate to be seen   Assessment   Assessment Patient participated in Skilled OT session this date with interventions consisting of ADL re-training, functional mobility/transfers, education on precautions  Patient agreeable to OT treatment session, upon arrival patient was found supine in bed  In comparison to previous session, patient with improvements in functional transfers/mobility  Pt requiring CGA for steadying during standing toileting (pt attempted to urinate, catheter was removed earlier in day; pt unable to urinate - RN aware)  Pt able to transfer to commode w/ MIN A and perform supine to sit w/ MIN A and use of sheet as leg   Pt able to adhere to LLE NWB precautions w/o cueing throughout session  Patient continues to be functioning below baseline level, occupational performance remains limited secondary to factors listed above and increased risk for falls and injury  From OT standpoint, recommendation at time of d/c would be Home OT/home with family support  Pt reports a family member will be home with him at all times to assist as needed  Recommend commode for increased ADL safety/independence  Patient to benefit from continued Occupational Therapy treatment while in the hospital to address deficits as defined above and maximize level of functional independence with ADLs and functional mobility      Plan   Treatment Interventions ADL retraining;Functional transfer training;Patient/family training;Equipment evaluation/education; Compensatory technique education   Goal Expiration Date 03/15/19   Treatment Day 1   OT Frequency 3-5x/wk   Recommendation   OT Discharge Recommendation Home OT  (& home w/ family support)   Equipment Recommended Bedside commode         Jones Pichardo, OT

## 2019-03-06 NOTE — RESTORATIVE TECHNICIAN NOTE
Restorative Specialist Mobility Note       Activity: Stand at bedside, Turn, Chair     Assistive Device: Four wheel walker        Repositioned: Up in chair, Sitting              Anti-Embolism Device On: Right lower extremity, Sequential compression devices, below knee

## 2019-03-07 PROBLEM — R33.9 URINARY RETENTION: Status: ACTIVE | Noted: 2019-03-07

## 2019-03-07 LAB
ANION GAP SERPL CALCULATED.3IONS-SCNC: 3 MMOL/L (ref 4–13)
BUN SERPL-MCNC: 7 MG/DL (ref 5–25)
CALCIUM SERPL-MCNC: 8 MG/DL (ref 8.3–10.1)
CHLORIDE SERPL-SCNC: 101 MMOL/L (ref 100–108)
CO2 SERPL-SCNC: 30 MMOL/L (ref 21–32)
CREAT SERPL-MCNC: 0.6 MG/DL (ref 0.6–1.3)
ERYTHROCYTE [DISTWIDTH] IN BLOOD BY AUTOMATED COUNT: 12.6 % (ref 11.6–15.1)
GFR SERPL CREATININE-BSD FRML MDRD: 139 ML/MIN/1.73SQ M
GLUCOSE SERPL-MCNC: 115 MG/DL (ref 65–140)
HCT VFR BLD AUTO: 17.9 % (ref 36.5–49.3)
HCT VFR BLD AUTO: 18.2 % (ref 36.5–49.3)
HCT VFR BLD AUTO: 20.8 % (ref 36.5–49.3)
HCT VFR BLD AUTO: 25.1 % (ref 36.5–49.3)
HGB BLD-MCNC: 6 G/DL (ref 12–17)
HGB BLD-MCNC: 6.3 G/DL (ref 12–17)
HGB BLD-MCNC: 7.1 G/DL (ref 12–17)
HGB BLD-MCNC: 8.7 G/DL (ref 12–17)
MCH RBC QN AUTO: 30 PG (ref 26.8–34.3)
MCHC RBC AUTO-ENTMCNC: 34.6 G/DL (ref 31.4–37.4)
MCV RBC AUTO: 87 FL (ref 82–98)
PLATELET # BLD AUTO: 149 THOUSANDS/UL (ref 149–390)
PMV BLD AUTO: 9.4 FL (ref 8.9–12.7)
POTASSIUM SERPL-SCNC: 3.5 MMOL/L (ref 3.5–5.3)
RBC # BLD AUTO: 2.1 MILLION/UL (ref 3.88–5.62)
SODIUM SERPL-SCNC: 134 MMOL/L (ref 136–145)
WBC # BLD AUTO: 10.42 THOUSAND/UL (ref 4.31–10.16)

## 2019-03-07 PROCEDURE — 85018 HEMOGLOBIN: CPT | Performed by: NURSE PRACTITIONER

## 2019-03-07 PROCEDURE — P9016 RBC LEUKOCYTES REDUCED: HCPCS

## 2019-03-07 PROCEDURE — 30233N1 TRANSFUSION OF NONAUTOLOGOUS RED BLOOD CELLS INTO PERIPHERAL VEIN, PERCUTANEOUS APPROACH: ICD-10-PCS | Performed by: SURGERY

## 2019-03-07 PROCEDURE — 99254 IP/OBS CNSLTJ NEW/EST MOD 60: CPT | Performed by: NURSE PRACTITIONER

## 2019-03-07 PROCEDURE — 85018 HEMOGLOBIN: CPT | Performed by: STUDENT IN AN ORGANIZED HEALTH CARE EDUCATION/TRAINING PROGRAM

## 2019-03-07 PROCEDURE — 85014 HEMATOCRIT: CPT | Performed by: STUDENT IN AN ORGANIZED HEALTH CARE EDUCATION/TRAINING PROGRAM

## 2019-03-07 PROCEDURE — 85027 COMPLETE CBC AUTOMATED: CPT | Performed by: STUDENT IN AN ORGANIZED HEALTH CARE EDUCATION/TRAINING PROGRAM

## 2019-03-07 PROCEDURE — 80048 BASIC METABOLIC PNL TOTAL CA: CPT | Performed by: STUDENT IN AN ORGANIZED HEALTH CARE EDUCATION/TRAINING PROGRAM

## 2019-03-07 PROCEDURE — 99232 SBSQ HOSP IP/OBS MODERATE 35: CPT | Performed by: SURGERY

## 2019-03-07 PROCEDURE — 85014 HEMATOCRIT: CPT | Performed by: NURSE PRACTITIONER

## 2019-03-07 RX ORDER — TAMSULOSIN HYDROCHLORIDE 0.4 MG/1
0.4 CAPSULE ORAL
Status: DISCONTINUED | OUTPATIENT
Start: 2019-03-07 | End: 2019-03-10 | Stop reason: HOSPADM

## 2019-03-07 RX ADMIN — OXYCODONE HYDROCHLORIDE 5 MG: 5 TABLET ORAL at 06:03

## 2019-03-07 RX ADMIN — ACETAMINOPHEN 650 MG: 325 TABLET, FILM COATED ORAL at 22:06

## 2019-03-07 RX ADMIN — OXYCODONE HYDROCHLORIDE 10 MG: 10 TABLET ORAL at 10:22

## 2019-03-07 RX ADMIN — ONDANSETRON 8 MG: 2 INJECTION INTRAMUSCULAR; INTRAVENOUS at 11:29

## 2019-03-07 RX ADMIN — HYDROMORPHONE HYDROCHLORIDE 0.5 MG: 1 INJECTION, SOLUTION INTRAMUSCULAR; INTRAVENOUS; SUBCUTANEOUS at 11:21

## 2019-03-07 RX ADMIN — METHOCARBAMOL 750 MG: 750 TABLET, FILM COATED ORAL at 06:03

## 2019-03-07 RX ADMIN — ONDANSETRON 8 MG: 2 INJECTION INTRAMUSCULAR; INTRAVENOUS at 20:59

## 2019-03-07 RX ADMIN — ENOXAPARIN SODIUM 30 MG: 30 INJECTION SUBCUTANEOUS at 22:07

## 2019-03-07 RX ADMIN — ENOXAPARIN SODIUM 30 MG: 30 INJECTION SUBCUTANEOUS at 08:45

## 2019-03-07 RX ADMIN — ONDANSETRON 4 MG: 2 INJECTION INTRAMUSCULAR; INTRAVENOUS at 04:57

## 2019-03-07 RX ADMIN — OXYCODONE HYDROCHLORIDE 10 MG: 10 TABLET ORAL at 19:37

## 2019-03-07 RX ADMIN — ACETAMINOPHEN 650 MG: 325 TABLET, FILM COATED ORAL at 10:22

## 2019-03-07 RX ADMIN — OXYCODONE HYDROCHLORIDE 10 MG: 10 TABLET ORAL at 15:36

## 2019-03-07 RX ADMIN — GABAPENTIN 100 MG: 100 CAPSULE ORAL at 08:45

## 2019-03-07 RX ADMIN — METHOCARBAMOL 750 MG: 750 TABLET, FILM COATED ORAL at 18:05

## 2019-03-07 RX ADMIN — METHOCARBAMOL 750 MG: 750 TABLET, FILM COATED ORAL at 23:50

## 2019-03-07 RX ADMIN — GABAPENTIN 100 MG: 100 CAPSULE ORAL at 18:02

## 2019-03-07 RX ADMIN — HYDROMORPHONE HYDROCHLORIDE 0.5 MG: 1 INJECTION, SOLUTION INTRAMUSCULAR; INTRAVENOUS; SUBCUTANEOUS at 22:05

## 2019-03-07 RX ADMIN — TAMSULOSIN HYDROCHLORIDE 0.4 MG: 0.4 CAPSULE ORAL at 18:06

## 2019-03-07 RX ADMIN — GABAPENTIN 100 MG: 100 CAPSULE ORAL at 22:06

## 2019-03-07 RX ADMIN — METHOCARBAMOL 750 MG: 750 TABLET, FILM COATED ORAL at 12:19

## 2019-03-07 RX ADMIN — HYDROMORPHONE HYDROCHLORIDE 0.5 MG: 1 INJECTION, SOLUTION INTRAMUSCULAR; INTRAVENOUS; SUBCUTANEOUS at 07:52

## 2019-03-07 NOTE — SOCIAL WORK
Cm reviewed patient during care coordination rounds  Patient not stable for discharge today  Patient has fever and low hemoglobin  Patient to receive transfusion  PT/OT continue to follow patient  [facility] needing to verify insurance information  Patient's family presented [employer], which was faxed to [place] and medical records  Cm relayed this information to [facility]  RW and commode delivered to patient's room today  Cm following  Insurance cards uploaded in [facility] for [facility] to review after discussing with Leslie Lee

## 2019-03-07 NOTE — OCCUPATIONAL THERAPY NOTE
Occupational Therapy Cancellation Note    Chart reviewed  Spoke to RN - per RN, hold OT at this time  Pt with low hemoglobin and will be transfused  OT to continue to follow and re-attempt session as able         Taylor Purvis, OT

## 2019-03-07 NOTE — ASSESSMENT & PLAN NOTE
Patient requiring several straight caths, will now have ramos placed  Will consult urology for recommendations

## 2019-03-07 NOTE — PROGRESS NOTES
Subjective:  Patient seen examined this morning no acute events overnight the patient does appear pale and slightly diaphoretic  Pain is well controlled and the patient has been able to get up with physical therapy daily    Left lower extremity  Dressing c/d/i with knee immobilizer in place  Motor & sensation grossly intact to foot and ankle  No pain with passive stretch in great toe  Lower leg musculature soft and compressible  Foot and ankle warm well-perfused    Assessment: Post op from from left hip CR PP, left femoral IM nail, ORIF left patella, ORIF and washout of left open tibial plateau fracture  Postop day 3    Plan:   Nonweightbearing left lower extremity  Follow-up morning labs  Completed for 8 hours of antibiotic prophylaxis  Pain control  DVT ppx  PT  Dispo:  Orthopedically stable for discharge pending labs and PT clearance    Objective:  Lab Results   Component Value Date/Time    WBC 11 38 (H) 03/05/2019 12:02 AM    HGB 9 9 (L) 03/05/2019 12:02 AM       Vitals:    03/06/19 2215   BP: 123/58   Pulse: (!) 109   Resp: 18   Temp: 100 5 °F (38 1 °C)   SpO2: 96%

## 2019-03-07 NOTE — PHYSICAL THERAPY NOTE
Physical Therapy Cancellation Note    PT cancelled as patient refused reporting he does not feel right  Per RN patient's temperature is higher than normal, and he will be getting another unit of blood  Cancelled PT  Will follow as able

## 2019-03-07 NOTE — PLAN OF CARE
Problem: Potential for Falls  Goal: Patient will remain free of falls  Description  INTERVENTIONS:  - Assess patient frequently for physical needs  -  Identify cognitive and physical deficits and behaviors that affect risk of falls    -  [place] fall precautions as indicated by assessment   - Educate patient/family on patient safety including physical limitations  - Instruct patient to call for assistance with activity based on assessment  - Modify environment to reduce risk of injury  - Consider OT/PT consult to assist with strengthening/mobility  Outcome: Progressing     Problem: Prexisting or High Potential for Compromised Skin Integrity  Goal: Skin integrity is maintained or improved  Description  INTERVENTIONS:  - Identify patients at risk for skin breakdown  - Assess and monitor skin integrity  - Assess and monitor nutrition and hydration status  - Monitor labs (i e  albumin)  - Assess for incontinence   - Turn and reposition patient  - Assist with mobility/ambulation  - Relieve pressure over bony prominences  - Avoid friction and shearing  - Provide appropriate hygiene as needed including keeping skin clean and dry  - Evaluate need for skin moisturizer/barrier cream  - Collaborate with interdisciplinary team (i e  Nutrition, Rehabilitation, etc )   - Patient/family teaching  Outcome: Progressing     Problem: PAIN - ADULT  Goal: Verbalizes/displays adequate comfort level or baseline comfort level  Description  Interventions:  - Encourage patient to monitor pain and request assistance  - Assess pain using appropriate pain scale  - Administer analgesics based on type and severity of pain and evaluate response  - Implement non-pharmacological measures as appropriate and evaluate response  - Consider cultural and social influences on pain and pain management  - Notify physician/advanced practitioner if interventions unsuccessful or patient reports new pain  Outcome: Progressing     Problem: INFECTION - ADULT  Goal: Absence or prevention of progression during hospitalization  Description  INTERVENTIONS:  - Assess and monitor for signs and symptoms of infection  - Monitor lab/diagnostic results  - Monitor all insertion sites, i e  indwelling lines, tubes, and drains  - Monitor endotracheal (as able) and nasal secretions for changes in amount and color  - [place] appropriate cooling/warming therapies per order  - Administer medications as ordered  - Instruct and encourage patient and family to use good hand hygiene technique  - Identify and instruct in appropriate isolation precautions for identified infection/condition  Outcome: Progressing     Problem: DISCHARGE PLANNING  Goal: Discharge to home or other facility with appropriate resources  Description  INTERVENTIONS:  - Identify barriers to discharge w/patient and caregiver  - Arrange for needed discharge resources and transportation as appropriate  - Identify discharge learning needs (meds, wound care, etc )  - Arrange for interpretive services to assist at discharge as needed  - Refer to Case Management Department for coordinating discharge planning if the patient needs post-hospital services based on physician/advanced practitioner order or complex needs related to functional status, cognitive ability, or social support system  Outcome: Progressing     Problem: Nutrition/Hydration-ADULT  Goal: Nutrient/Hydration intake appropriate for improving, restoring or maintaining nutritional needs  Description  Monitor and assess patient's nutrition/hydration status for malnutrition (ex- brittle hair, bruises, dry skin, pale skin and conjunctiva, muscle wasting, smooth red tongue, and disorientation)  Collaborate with interdisciplinary team and initiate plan and interventions as ordered  Monitor patient's weight and dietary intake as ordered or per policy  Utilize nutrition screening tool and intervene per policy   Determine patient's food preferences and provide high-protein, high-caloric foods as appropriate       INTERVENTIONS:  - Monitor oral intake, urinary output, labs, and treatment plans  - Assess nutrition and hydration status and recommend course of action  - Evaluate amount of meals eaten  - Assist patient with eating if necessary   - Allow adequate time for meals  - Recommend/ encourage appropriate diets, oral nutritional supplements, and vitamin/mineral supplements  - Order, calculate, and assess calorie counts as needed  - Recommend, monitor, and adjust tube feedings and TPN/PPN based on assessed needs  - Assess need for intravenous fluids  - Provide specific nutrition/hydration education as appropriate  - Include patient/family/caregiver in decisions related to nutrition  Outcome: Progressing     Problem: DISCHARGE PLANNING - CARE MANAGEMENT  Goal: Discharge to post-acute care or home with appropriate resources  Description  INTERVENTIONS:  - Conduct assessment to determine patient/family and health care team treatment goals, and need for post-acute services based on payer coverage, community resources, and patient preferences, and barriers to discharge  - Address psychosocial, clinical, and financial barriers to discharge as identified in assessment in conjunction with the patient/family and health care team  - Arrange appropriate level of post-acute services according to patient?s   needs and preference and payer coverage in collaboration with the physician and health care team  - Communicate with and update the patient/family, physician, and health care team regarding progress on the discharge plan  - Arrange appropriate transportation to post-acute venues  Outcome: Progressing

## 2019-03-07 NOTE — PHYSICAL THERAPY NOTE
Physical Therapy Cancellation Note    PT  Session cancelled for this a m  Per RN request as patient's HbG is low and she wanted to give a unit a blood before PT  Will continue to follow as able
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
## 2019-03-07 NOTE — ASSESSMENT & PLAN NOTE
Continue Ancef q8h for total of 48 hrs  Consider extending treatment as patient had febrile recordings overnight

## 2019-03-08 PROBLEM — D64.9 ANEMIA: Status: ACTIVE | Noted: 2019-03-08

## 2019-03-08 LAB
ABO GROUP BLD BPU: NORMAL
ABO GROUP BLD BPU: NORMAL
ANION GAP SERPL CALCULATED.3IONS-SCNC: 4 MMOL/L (ref 4–13)
BASOPHILS # BLD AUTO: 0.05 THOUSANDS/ΜL (ref 0–0.1)
BASOPHILS NFR BLD AUTO: 1 % (ref 0–1)
BPU ID: NORMAL
BPU ID: NORMAL
BUN SERPL-MCNC: 9 MG/DL (ref 5–25)
CALCIUM SERPL-MCNC: 8.4 MG/DL (ref 8.3–10.1)
CHLORIDE SERPL-SCNC: 106 MMOL/L (ref 100–108)
CO2 SERPL-SCNC: 29 MMOL/L (ref 21–32)
CREAT SERPL-MCNC: 0.7 MG/DL (ref 0.6–1.3)
CROSSMATCH: NORMAL
CROSSMATCH: NORMAL
EOSINOPHIL # BLD AUTO: 0.28 THOUSAND/ΜL (ref 0–0.61)
EOSINOPHIL NFR BLD AUTO: 3 % (ref 0–6)
ERYTHROCYTE [DISTWIDTH] IN BLOOD BY AUTOMATED COUNT: 13.2 % (ref 11.6–15.1)
GFR SERPL CREATININE-BSD FRML MDRD: 130 ML/MIN/1.73SQ M
GLUCOSE SERPL-MCNC: 112 MG/DL (ref 65–140)
HCT VFR BLD AUTO: 24.2 % (ref 36.5–49.3)
HGB BLD-MCNC: 8.3 G/DL (ref 12–17)
IMM GRANULOCYTES # BLD AUTO: 0.13 THOUSAND/UL (ref 0–0.2)
IMM GRANULOCYTES NFR BLD AUTO: 1 % (ref 0–2)
LYMPHOCYTES # BLD AUTO: 1.76 THOUSANDS/ΜL (ref 0.6–4.47)
LYMPHOCYTES NFR BLD AUTO: 20 % (ref 14–44)
MCH RBC QN AUTO: 30.2 PG (ref 26.8–34.3)
MCHC RBC AUTO-ENTMCNC: 34.3 G/DL (ref 31.4–37.4)
MCV RBC AUTO: 88 FL (ref 82–98)
MONOCYTES # BLD AUTO: 0.7 THOUSAND/ΜL (ref 0.17–1.22)
MONOCYTES NFR BLD AUTO: 8 % (ref 4–12)
NEUTROPHILS # BLD AUTO: 6.05 THOUSANDS/ΜL (ref 1.85–7.62)
NEUTS SEG NFR BLD AUTO: 67 % (ref 43–75)
NRBC BLD AUTO-RTO: 1 /100 WBCS
PLATELET # BLD AUTO: 177 THOUSANDS/UL (ref 149–390)
PMV BLD AUTO: 9.6 FL (ref 8.9–12.7)
POTASSIUM SERPL-SCNC: 3.5 MMOL/L (ref 3.5–5.3)
RBC # BLD AUTO: 2.75 MILLION/UL (ref 3.88–5.62)
SODIUM SERPL-SCNC: 139 MMOL/L (ref 136–145)
UNIT DISPENSE STATUS: NORMAL
UNIT DISPENSE STATUS: NORMAL
UNIT PRODUCT CODE: NORMAL
UNIT PRODUCT CODE: NORMAL
UNIT RH: NORMAL
UNIT RH: NORMAL
WBC # BLD AUTO: 8.97 THOUSAND/UL (ref 4.31–10.16)

## 2019-03-08 PROCEDURE — 97535 SELF CARE MNGMENT TRAINING: CPT

## 2019-03-08 PROCEDURE — 80048 BASIC METABOLIC PNL TOTAL CA: CPT | Performed by: STUDENT IN AN ORGANIZED HEALTH CARE EDUCATION/TRAINING PROGRAM

## 2019-03-08 PROCEDURE — 99232 SBSQ HOSP IP/OBS MODERATE 35: CPT | Performed by: SURGERY

## 2019-03-08 PROCEDURE — 97530 THERAPEUTIC ACTIVITIES: CPT

## 2019-03-08 PROCEDURE — 85025 COMPLETE CBC W/AUTO DIFF WBC: CPT | Performed by: STUDENT IN AN ORGANIZED HEALTH CARE EDUCATION/TRAINING PROGRAM

## 2019-03-08 RX ADMIN — GABAPENTIN 100 MG: 100 CAPSULE ORAL at 16:01

## 2019-03-08 RX ADMIN — METHOCARBAMOL 750 MG: 750 TABLET, FILM COATED ORAL at 23:00

## 2019-03-08 RX ADMIN — ACETAMINOPHEN 650 MG: 325 TABLET, FILM COATED ORAL at 14:13

## 2019-03-08 RX ADMIN — ENOXAPARIN SODIUM 30 MG: 30 INJECTION SUBCUTANEOUS at 21:30

## 2019-03-08 RX ADMIN — METHOCARBAMOL 750 MG: 750 TABLET, FILM COATED ORAL at 11:31

## 2019-03-08 RX ADMIN — METHOCARBAMOL 750 MG: 750 TABLET, FILM COATED ORAL at 05:01

## 2019-03-08 RX ADMIN — GABAPENTIN 100 MG: 100 CAPSULE ORAL at 08:45

## 2019-03-08 RX ADMIN — OXYCODONE HYDROCHLORIDE 10 MG: 10 TABLET ORAL at 16:01

## 2019-03-08 RX ADMIN — TAMSULOSIN HYDROCHLORIDE 0.4 MG: 0.4 CAPSULE ORAL at 18:03

## 2019-03-08 RX ADMIN — METHOCARBAMOL 750 MG: 750 TABLET, FILM COATED ORAL at 18:02

## 2019-03-08 RX ADMIN — OXYCODONE HYDROCHLORIDE 10 MG: 10 TABLET ORAL at 04:44

## 2019-03-08 RX ADMIN — ENOXAPARIN SODIUM 30 MG: 30 INJECTION SUBCUTANEOUS at 08:45

## 2019-03-08 RX ADMIN — OXYCODONE HYDROCHLORIDE 10 MG: 10 TABLET ORAL at 00:38

## 2019-03-08 RX ADMIN — HYDROMORPHONE HYDROCHLORIDE 0.5 MG: 1 INJECTION, SOLUTION INTRAMUSCULAR; INTRAVENOUS; SUBCUTANEOUS at 10:45

## 2019-03-08 RX ADMIN — GABAPENTIN 100 MG: 100 CAPSULE ORAL at 20:04

## 2019-03-08 RX ADMIN — OXYCODONE HYDROCHLORIDE 10 MG: 10 TABLET ORAL at 08:45

## 2019-03-08 RX ADMIN — OXYCODONE HYDROCHLORIDE 10 MG: 10 TABLET ORAL at 20:04

## 2019-03-08 NOTE — SOCIAL WORK
Cm uploaded Claim information for [facility] per their request when pricing out hospital bed coverage/cost   Progress note from Hubert Pepper uploaded as well

## 2019-03-08 NOTE — SOCIAL WORK
Cm reviewed patient during care coordination rounds  Patient anticipated for possible discharge home over the weekend  [facility] able to accept for RN, PT/OT services  Patient's DME ([facility] and José) was already delivered to patient's room  Patient's sister requesting hospital bed be priced out as patient does not have 1st floor set up per family's report  Cm attempted to page Trauma but no return page received  Cm informed Cm Will who will talk with Trauma regarding patient's family's request   Cm following
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
## 2019-03-08 NOTE — PHYSICAL THERAPY NOTE
PHYSICAL THERAPY TREATMENT NOTE    Patient Name: Melony Ryder  DMADI'B Date: 3/8/2019     03/08/19 1136   Pain Assessment   Pain Assessment 0-10   Pain Score Worst Possible Pain   Pain Type Acute pain   Pain Location Leg   Pain Orientation Left   Restrictions/Precautions   Weight Bearing Precautions Per Order Yes   LLE Weight Bearing Per Order NWB   Braces or Orthoses LE Immobilizer   Other Precautions Multiple lines; Fall Risk;Pain   General   Chart Reviewed Yes   Response to Previous Treatment Patient with no complaints from previous session  Family/Caregiver Present Yes  (mother)   Cognition   Overall Cognitive Status WFL   Arousal/Participation Cooperative   Attention Within functional limits   Comments Pt identified by name and   Subjective   Subjective Agrees to PT treatment, reports he want to walk  Bed Mobility   Supine to Sit 3  Moderate assistance   Additional items Assist x 1; Increased time required;HOB elevated; Bedrails;Verbal cues;LE management   Sit to Supine 2  Maximal assistance   Additional items Assist x 2; Increased time required;Verbal cues;LE management  (increased assist due to decrease in BP and position on EOB)   Transfers   Sit to Stand 4  Minimal assistance   Additional items Assist x 1; Increased time required;Verbal cues  (mother assisting, PT providing close supervision )   Stand to Sit 4  Minimal assistance   Additional items Assist x 1; Increased time required;Verbal cues   Additional Comments Family education provided on bed mobility as well as sit to stand transfers  Pt was able to perform static/dynamic standing with use of RW for donning/doffing of undergarments  Pt was able to perform ~3 sit to stand transfers, however began to report dizziness/faintness  Pt sat at EOB with MW=954/63 and pale facial coloring  Pt returned to supine with DW=812/57  Able to perform bed mobility repositioning with min A x1 and verbal instructions     Ambulation/Elevation   Gait pattern   (unable to assess this session due to symptoms)   Balance   Static Sitting Fair   Dynamic Sitting Fair -   Static Standing Fair -   Dynamic Standing Poor +   Endurance Deficit   Endurance Deficit Yes   Endurance Deficit Description limited standing tolerance, decrease in BP   Activity Tolerance   Activity Tolerance Treatment limited secondary to medical complications (Comment); Patient limited by fatigue;Patient limited by pain   Nurse Made Aware Per RN, pt appropriate to treat   Assessment   Prognosis Good   Problem List Decreased strength;Decreased endurance; Impaired balance;Decreased mobility; Impaired judgement;Pain;Orthopedic restrictions   Assessment Pt tolerated treatment fairly, however limited due to dizziness and decrease in BP with standing  Treatment focused on family training for bed mobility as well as transfers  Static and dynamic standing tolerance performed with use of RW, however limited due to symptoms listed above  Mother reports good understanding of transfer training  Will continue to benefit from ongoing skilled PT to maximize his functional mobility and increase his level of independence  Anticipating pt will be able to go home with home PT as pt has good family support  Recommending use of RW at home  Goals   Patient Goals to get up and walk   STG Expiration Date 03/15/19   Treatment Day 3   Plan   Treatment/Interventions Functional transfer training;LE strengthening/ROM; Therapeutic exercise; Endurance training;Patient/family training;Equipment eval/education; Bed mobility;Gait training   Progress Slow progress, decreased activity tolerance   PT Frequency   (4-6x/week)   Recommendation   Recommendation Home PT; Home with family support   Equipment Recommended Leela Dooley, PT,DPT

## 2019-03-08 NOTE — PLAN OF CARE
Problem: PHYSICAL THERAPY ADULT  Goal: Performs mobility at highest level of function for planned discharge setting  See evaluation for individualized goals  Description  Treatment/Interventions: Functional transfer training, LE strengthening/ROM, Elevations, Therapeutic exercise, Endurance training, Patient/family training, Equipment eval/education, Bed mobility, Gait training, Spoke to nursing, OT, Family  Equipment Recommended: Walker(RW)       See flowsheet documentation for full assessment, interventions and recommendations  Note:   Prognosis: Good  Problem List: Decreased strength, Decreased endurance, Impaired balance, Decreased mobility, Impaired judgement, Pain, Orthopedic restrictions  Assessment: Pt tolerated treatment fairly, however limited due to dizziness and decrease in BP with standing  Treatment focused on family training for bed mobility as well as transfers  Static and dynamic standing tolerance performed with use of RW, however limited due to symptoms listed above  Mother reports good understanding of transfer training  Will continue to benefit from ongoing skilled PT to maximize his functional mobility and increase his level of independence  Anticipating pt will be able to go home with home PT as pt has good family support  Recommending use of RW at home  Barriers to Discharge: None  Barriers to Discharge Comments: 1 DAVE and 2 story home  Recommendation: Home PT, Home with family support     PT - OK to Discharge: Yes    See flowsheet documentation for full assessment

## 2019-03-08 NOTE — PROGRESS NOTES
Progress Note - Minor Course 1990, 34 y o  male MRN: [id number]    Unit/Bed#: [facility] 817-01 Encounter: [id number]    Primary Care Provider: Maddie Hoskins DO   Date and time admitted to hospital: 3/4/2019  1:20 PM        Anemia  Assessment & Plan  ABLA likely 2/2 surgery  S/p 2u pRBCs  Hb 6 0 -> 8 7->8 3 following transfusion  Trend Hb qd  Transfuse for Hb <7    Urinary retention  Assessment & Plan  Gentile for 1 day  F/U urology rec's    Type I or II open fracture of left tibial plateau  Assessment & Plan  See below    Type I or II open comminuted fracture of left patella  Assessment & Plan  S/p 48 hrs Ancef    Closed displaced comminuted fracture of shaft of left femur ([address] )  Assessment & Plan  See below    * Closed fracture of neck of left femur (HCC)  Assessment & Plan  NWB LLE for all documented fractures  PT/OT following; recommend d/c home in 1-2 days  CM for disposition planning  Pain control  DVT Ppx            Subjective/Objective   Chief Complaint: I feel better today    Subjective: No acute events overnight  Pain well controlled  Pateint rested comfortably throughou the night  Hb 8 3 up from 6 0 prior to transfusion of 2u pRBCs    Objective:     Meds/Allergies   No medications prior to admission  Vitals: Blood pressure 115/55, pulse 95, temperature 98 9 °F (37 2 °C), temperature source Oral, resp  rate 18, height 5' 6" (1 676 m), weight 73 5 kg (162 lb), SpO2 95 %  Body mass index is 26 15 kg/m²   SpO2: SpO2: 95 %    ABG: No results found for: PHART, CFW0JFU, PO2ART, IEV0OJN, V2NCUNVF, BEART, SOURCE      Intake/Output Summary (Last 24 hours) at 3/8/2019 0734  Last data filed at 3/8/2019 0300  Gross per 24 hour   Intake 906 66 ml   Output 2125 ml   Net -1218 34 ml       Invasive Devices     Peripheral Intravenous Line            Peripheral IV 03/04/19 Right Antecubital 3 days    Peripheral IV 03/07/19 Left Forearm less than 1 day          Drain            Urethral Catheter Latex 18 Fr  1 day                Nutrition/GI Proph/Bowel Reg: Regular diet    Physical Exam:   Physical Exam   Constitutional: He is oriented to person, place, and time  He appears well-developed and well-nourished  No distress  HENT:   Head: Normocephalic  Multiple superficial facial lacerations    Eyes: Pupils are equal, round, and reactive to light  Conjunctivae and EOM are normal  No scleral icterus  Neck: Normal range of motion  Neck supple  Cardiovascular: Normal rate, regular rhythm, normal heart sounds and intact distal pulses  Exam reveals no gallop and no friction rub  No murmur heard  Pulmonary/Chest: Effort normal and breath sounds normal  No respiratory distress  He has no wheezes  Abdominal: Soft  Bowel sounds are normal  He exhibits no distension and no mass  There is no tenderness  There is no rebound and no guarding  Musculoskeletal: Normal range of motion  He exhibits no edema  LLE in splint, immobilizer  Distal pulses intact  Neuro intact   Neurological: He is alert and oriented to person, place, and time  He displays normal reflexes  No cranial nerve deficit or sensory deficit  He exhibits normal muscle tone  Coordination normal    Skin: Skin is warm and dry  No rash noted  He is not diaphoretic  Psychiatric: He has a normal mood and affect  His behavior is normal    Nursing note and vitals reviewed  Lab Results:   CBC:   Lab Results   Component Value Date    WBC 8 97 03/08/2019    HGB 8 3 (L) 03/08/2019    HCT 24 2 (L) 03/08/2019    MCV 88 03/08/2019     03/08/2019    MCH 30 2 03/08/2019    MCHC 34 3 03/08/2019    RDW 13 2 03/08/2019    MPV 9 6 03/08/2019    NRBC 1 03/08/2019     Imaging/EKG Studies: Results: I have personally reviewed pertinent films in PACS   Xr Shoulder 2+ Vw Right    Result Date: 3/4/2019  Impression: No acute osseous abnormality  Workstation performed: [id number]     Xr Humerus Right    Result Date: 3/6/2019  Impression: No acute osseous abnormality  Workstation performed: [id number]     Xr Elbow 3+ Vw Left    Result Date: 3/4/2019  Impression: Limited study by patient positioning demonstrates no evidence of a displaced fracture or subluxation of the left elbow  If symptoms persist, consider a repeat study with proper positioning or CT scan for further evaluation  Workstation performed: [id number]     Xr Elbow 3+ Vw Right    Result Date: 3/4/2019  Impression: Limited study by patient positioning demonstrates no gross evidence of fracture or subluxation  If symptoms persist, consider a repeat study with proper positioning or CT scan for further evaluation  Workstation performed: [id number]     Xr Hand 3+ Vw Left    Result Date: 3/6/2019  Impression: No acute osseous abnormality  Workstation performed: [id number]     Xr Hand 3+ Vw Right    Result Date: 3/6/2019  Impression: No acute osseous abnormality  Workstation performed: [id number]     Xr Femur 2 Vw Left    Result Date: 3/5/2019  Impression: Fluoroscopic guidance provided for surgical procedure  Please refer to the separate procedure notes for additional details  Workstation performed: [id number]     Xr Knee 1 Or 2 Vw Left    Result Date: 3/5/2019  Impression: Fluoroscopic guidance provided for surgical procedure  Please refer to the separate procedure notes for additional details  Workstation performed: [id number]     Xr Knee 1 Or 2 Vw Left    Result Date: 3/4/2019  Impression: Mildly displaced acute comminuted fracture of the proximal left tibia area Mildly displaced acute patellar fracture  Large left knee joint effusion with a fat fluid level suggestive of lipohemarthrosis  Workstation performed: [id number]     Xr Ankle 3+ Vw Left    Result Date: 3/6/2019  Impression: No acute osseous abnormality  Workstation performed: [id number]     Xr Foot 3+ Vw Left    Result Date: 3/6/2019  Impression: No acute osseous abnormality   Workstation performed: [id number]     Ct Head Wo Contrast    Result Date: 3/4/2019  Impression: No acute intracranial abnormality  I personally discussed this study with Dr Yousuf Davidson on 3/4/2019 at 2:01 PM  Workstation performed: [id number]     Ct Spine Cervical Wo Contrast    Result Date: 3/4/2019  Impression: No cervical spine fracture or traumatic malalignment  I personally discussed this study with Dr Yousuf Davidson on 3/4/2019 at 2:01 PM   Workstation performed: [id number]     Ct Chest Abdomen Pelvis W Contrast    Result Date: 3/4/2019  Impression: Unremarkable chest  No evidence of solid organ injury  Nondisplaced fractures of the left transverse processes of L1-L4  Nondisplaced left subcapital femoral neck fracture  I personally discussed this study with Dr Yousuf Davidson on 3/4/2019 at 2:01 PM  Workstation performed: [id number]     Xr Trauma Multiple    Result Date: 3/4/2019  Impression: No active pulmonary disease  Comminuted displaced left mid femoral diaphysis fracture with large butterfly fragment  Comminuted impacted proximal left tibial fracture appears to extend to the lateral tibial plateau  Shattered patella   Workstation performed: [id number]     Other Studies:   VTE Prophylaxis: lovenox/scds

## 2019-03-08 NOTE — ASSESSMENT & PLAN NOTE
ABLA likely 2/2 surgery  S/p 2u pRBCs  Hb 6 0 -> 8 7->8 3 following transfusion  Trend Hb qd  Transfuse for Hb <7

## 2019-03-08 NOTE — PROGRESS NOTES
Trauma rounds completed with patient  Requesting a hospital bed for at home  Working with PT and OT  Patient has isolated Left lower extremity complex fractures which were surgically repaired  He would benefit from a hospital bed at home secondary to positioning

## 2019-03-08 NOTE — PLAN OF CARE
Problem: OCCUPATIONAL THERAPY ADULT  Goal: Performs self-care activities at highest level of function for planned discharge setting  See evaluation for individualized goals  Description  Treatment Interventions: ADL retraining, Functional transfer training, Endurance training, Patient/family training, Equipment evaluation/education, Compensatory technique education, Continued evaluation, Activityengagement, Energy conservation          See flowsheet documentation for full assessment, interventions and recommendations  Outcome: Progressing  Note:   Limitation: Decreased ADL status, Decreased Safe judgement during ADL, Decreased endurance, Decreased self-care trans, Decreased high-level ADLs, Decreased fine motor control  Prognosis: Fair  Assessment: Patient participated in Skilled OT session this date with interventions consisting of ADL re training with the use of correct body mechnaics, Energy Conservation techniques, deep breathing technique, safety awareness and fall prevention techniques,  therapeutic activities to: increase activity tolerance, increase postural control, increase trunk control and increase OOB/ sitting tolerance   Patient agreeable to OT treatment session, upon arrival patient was found supine in bed  In comparison to previous session, patient with improvements in transfers*   Patient requiring verbal cues for safety, verbal cues for correct technique and frequent rest periods  Patient continues to be functioning below baseline level, occupational performance remains limited secondary to factors listed above and increased risk for falls and injury  From OT standpoint, recommendation at time of d/c would be Home with family support  Patient to benefit from continued Occupational Therapy treatment while in the hospital to address deficits as defined above and maximize level of functional independence with ADLs and functional mobility        OT Discharge Recommendation: Home with family support  OT - OK to Discharge: No(additional session for transfer training)

## 2019-03-08 NOTE — OCCUPATIONAL THERAPY NOTE
OccupationalTherapy Progress Note     Patient Name: Madonna Oreilly  Today's Date: 3/8/2019  Problem List  Patient Active Problem List   Diagnosis    Closed fracture of neck of left femur (HCC)    Closed displaced comminuted fracture of shaft of left femur (HCC)    Type I or II open comminuted fracture of left patella    Type I or II open fracture of left tibial plateau    Urinary retention    Anemia           03/08/19 1134   Restrictions/Precautions   Weight Bearing Precautions Per Order Yes   LLE Weight Bearing Per Order NWB   Braces or Orthoses LE Immobilizer   Other Precautions Multiple lines; Fall Risk;Pain   General   Response to Previous Treatment Patient with no complaints from previous session   Lifestyle   Autonomy pta pt reports I in ADLs/IADLs/functional mobility   Reciprocal Relationships supportive family who can assist 24/7   Service to Others works for [employer] enjoys driving and spending time w/ girlfriend   Pain Assessment   Pain Assessment 0-10   Pain Score 4   Pain Type Acute pain   Pain Location Leg   ADL   Where Assessed Edge of bed   LB Dressing Assistance 3  Moderate Assistance   LB Dressing Deficit Thread RLE into underwear; Thread LLE into underwear;Pull up over hips; Increased time to complete;Supervision/safety;Verbal cueing   Bed Mobility   Supine to Sit 3  Moderate assistance   Additional items Assist x 1   Sit to Supine 2  Maximal assistance   Additional items Assist x 2;LE management  (pt w/ /63 upon standing)   Transfers   Sit to Stand 4  Minimal assistance   Additional items Assist x 2; Increased time required   Stand to Sit 4  Minimal assistance   Additional items Assist x 2; Increased time required   Cognition   Overall Cognitive Status WFL   Arousal/Participation Cooperative   Attention Within functional limits   Orientation Level Oriented X4   Memory Within functional limits   Following Commands Follows one step commands with increased time or repetition   Additional Activities   Additional Activities Comments training w/ mother present for LB dressing, transfers   Activity Tolerance   Activity Tolerance Patient limited by pain;Treatment limited secondary to medical complications (Comment)  (Dizziness, /63 upon standing)   Medical Staff Made Aware PT [address] present and RN aware   Assessment   Assessment Patient participated in Skilled OT session this date with interventions consisting of ADL re training with the use of correct body mechnaics, Energy Conservation techniques, deep breathing technique, safety awareness and fall prevention techniques,  therapeutic activities to: increase activity tolerance, increase postural control, increase trunk control and increase OOB/ sitting tolerance   Patient agreeable to OT treatment session, upon arrival patient was found supine in bed  In comparison to previous session, patient with improvements in transfers*   Patient requiring verbal cues for safety, verbal cues for correct technique and frequent rest periods  Patient continues to be functioning below baseline level, occupational performance remains limited secondary to factors listed above and increased risk for falls and injury  From OT standpoint, recommendation at time of d/c would be Home with family support  Patient to benefit from continued Occupational Therapy treatment while in the hospital to address deficits as defined above and maximize level of functional independence with ADLs and functional mobility  Plan   Treatment Interventions ADL retraining;Functional transfer training;Patient/family training;Equipment evaluation/education; Compensatory technique education; Energy conservation   Goal Expiration Date 03/15/19   Treatment Day 2   OT Frequency 3-5x/wk   Recommendation   OT Discharge Recommendation Home with family support   OT - OK to Discharge No  (additional session for transfer training)     Lance Siddiqui MS, OTR/L

## 2019-03-08 NOTE — PROGRESS NOTES
Subjective: Patient seen and examined this morning, no acute events overnight  Pain well controlled      Left lower extremity  Dressing c/d/i  Knee immobilizer in place   Motor & sensation grossly intact to foot and ankle  Foot warm and well perfused    Assessment: Post op from [facility] left hip, retrograde IMN left femoral shaft, ORIF left patella, operative washout and ORIF of tibial plateau, POD 4    Plan:  NWB LLE in KI  Pain control  DVT ppx: Lov and mechanical  PT  Dispo: Orthopaedically stable for discharge    Objective:  Lab Results   Component Value Date/Time    WBC 8 97 03/08/2019 04:49 AM    HGB 8 3 (L) 03/08/2019 04:49 AM       Vitals:    03/07/19 2309   BP: 131/59   Pulse: 104   Resp: 18   Temp: 99 7 °F (37 6 °C)   SpO2: 94%

## 2019-03-09 PROCEDURE — 97116 GAIT TRAINING THERAPY: CPT

## 2019-03-09 PROCEDURE — 97535 SELF CARE MNGMENT TRAINING: CPT

## 2019-03-09 PROCEDURE — 97530 THERAPEUTIC ACTIVITIES: CPT

## 2019-03-09 RX ADMIN — METHOCARBAMOL 750 MG: 750 TABLET, FILM COATED ORAL at 06:00

## 2019-03-09 RX ADMIN — ENOXAPARIN SODIUM 30 MG: 30 INJECTION SUBCUTANEOUS at 08:17

## 2019-03-09 RX ADMIN — TAMSULOSIN HYDROCHLORIDE 0.4 MG: 0.4 CAPSULE ORAL at 17:10

## 2019-03-09 RX ADMIN — OXYCODONE HYDROCHLORIDE 5 MG: 5 TABLET ORAL at 17:09

## 2019-03-09 RX ADMIN — METHOCARBAMOL 750 MG: 750 TABLET, FILM COATED ORAL at 17:10

## 2019-03-09 RX ADMIN — ACETAMINOPHEN 650 MG: 325 TABLET, FILM COATED ORAL at 13:00

## 2019-03-09 RX ADMIN — ACETAMINOPHEN 650 MG: 325 TABLET, FILM COATED ORAL at 00:38

## 2019-03-09 RX ADMIN — METHOCARBAMOL 750 MG: 750 TABLET, FILM COATED ORAL at 11:16

## 2019-03-09 RX ADMIN — OXYCODONE HYDROCHLORIDE 5 MG: 5 TABLET ORAL at 21:10

## 2019-03-09 RX ADMIN — OXYCODONE HYDROCHLORIDE 5 MG: 5 TABLET ORAL at 12:59

## 2019-03-09 RX ADMIN — GABAPENTIN 100 MG: 100 CAPSULE ORAL at 08:17

## 2019-03-09 RX ADMIN — GABAPENTIN 100 MG: 100 CAPSULE ORAL at 16:45

## 2019-03-09 RX ADMIN — OXYCODONE HYDROCHLORIDE 10 MG: 10 TABLET ORAL at 00:36

## 2019-03-09 RX ADMIN — GABAPENTIN 100 MG: 100 CAPSULE ORAL at 21:10

## 2019-03-09 RX ADMIN — ENOXAPARIN SODIUM 30 MG: 30 INJECTION SUBCUTANEOUS at 21:10

## 2019-03-09 RX ADMIN — OXYCODONE HYDROCHLORIDE 5 MG: 5 TABLET ORAL at 08:17

## 2019-03-09 NOTE — PHYSICAL THERAPY NOTE
Physical Therapy Progress Note     03/09/19 0923   Pain Assessment   Pain Assessment FLACC   Pain Rating: FLACC (Rest) - Face 0   Pain Rating: FLACC (Rest) - Legs 0   Pain Rating: FLACC (Rest) - Activity 0   Pain Rating: FLACC (Rest) - Cry 0   Pain Rating: FLACC (Rest) - Consolability 0   Score: FLACC (Rest) 0   Pain Rating: FLACC (Activity) - Face 1   Pain Rating: FLACC (Activity) - Legs 0   Pain Rating: FLACC (Activity) - Activity 0   Pain Rating: FLACC (Activity) - Cry 1   Pain Rating: FLACC (Activity) - Consolability 0   Score: FLACC (Activity) 2   Restrictions/Precautions   Weight Bearing Precautions Per Order Yes   LLE Weight Bearing Per Order NWB   Braces or Orthoses LE Immobilizer   Other Precautions Fall Risk;Pain;WBS   General   Chart Reviewed Yes   Response to Previous Treatment Patient with no complaints from previous session  Family/Caregiver Present Yes  (sister)   Cognition   Overall Cognitive Status WFL   Arousal/Participation Alert; Responsive; Cooperative   Attention Within functional limits   Orientation Level Oriented X4   Memory Within functional limits   Following Commands Follows multistep commands with increased time or repetition   Bed Mobility   Supine to Sit 4  Minimal assistance   Additional items Assist x 1; Increased time required;LE management;HOB elevated; Bedrails   Transfers   Sit to Stand 4  Minimal assistance   Additional items Assist x 1; Increased time required;Verbal cues   Stand to Sit 4  Minimal assistance   Additional items Assist x 1; Increased time required;Verbal cues;Armrests   Ambulation/Elevation   Gait pattern Excessively slow  (hopping on RLE)   Gait Assistance 4  Minimal assist   Additional items Assist x 1   Assistive Device Rolling walker   Distance 180'   Endurance Deficit   Endurance Deficit Yes   Activity Tolerance   Activity Tolerance Patient limited by fatigue   Nurse Made Aware VANNA Ma ok to see   Assessment   Prognosis Good   Problem List Decreased strength;Decreased range of motion;Decreased endurance; Impaired balance;Decreased mobility;Pain;Orthopedic restrictions   Assessment Pt supine in bed upon arrival pleasant and agreeable to OOB ambualtion  Pt requires Min assist for all bed mobility, assist for LLE management  Pt seated EOB without c/o dizziness or lightheadedness  Pt requires Min assist x1 for sit to stand transfers and was able to ambulates 180' using RW and hopping on RLE  Pt educated on proper use of RW to stay within KATERINA of RW and to slow maren for safety  Pt has no c/o dizziness or lightheadedness throughout session, two standing rest breaks throughout session due to fatigue  Pt was educated on breathing technique throughout session as well  Pt returned to seated in recliner post session, and left with OT for ADLs  This pt will continue to benenfit from skilled PT to increase strength and endurance in order to maximize safe fucntional mobility  Barriers to Discharge Inaccessible home environment   Barriers to Discharge Comments   (1 DAVE, 2 story home)   Goals   Patient Goals to walk   STG Expiration Date 03/15/19   Short Term Goal #1 1  Pt will increased strength by 1 grade in order to increase functional independence with transfers  2  Pt will increase balance grade by 1 in order to improve safety  3  Pt will improve transfer ability to min A to increase functional independence and decrease caregiver burden  4  Pt will perform bed mobility with min A to decrease caregiver burden  5  Pt will be able to amb 50 ft with RW and min A to increase functional independence in home and community environments  6  Pt will be able to ascend and descend 1 step min A to facilitate pt ability to enter home environment  7  Pt will be able to maintain LLE NWB precautions 100% of the time throughout therapy sessions  Plan   Treatment/Interventions Functional transfer training;LE strengthening/ROM; Elevations; Therapeutic exercise; Endurance training; Bed mobility;Gait training   Progress Progressing toward goals   PT Frequency   (4-6x/week)   Recommendation   Recommendation Home PT; Home with family support   Equipment Recommended Walker   PT - OK to Discharge No  (NEEDS TO CLEAR STAIRS)     Kiah Heart, PTA

## 2019-03-09 NOTE — PLAN OF CARE
Problem: OCCUPATIONAL THERAPY ADULT  Goal: Performs self-care activities at highest level of function for planned discharge setting  See evaluation for individualized goals  Description  Treatment Interventions: ADL retraining, Functional transfer training, Endurance training, Patient/family training, Equipment evaluation/education, Compensatory technique education, Continued evaluation, Activityengagement, Energy conservation          See flowsheet documentation for full assessment, interventions and recommendations  Outcome: Progressing  Note:   Limitation: Decreased ADL status, Decreased Safe judgement during ADL, Decreased endurance, Decreased self-care trans, Decreased high-level ADLs, Decreased fine motor control  Prognosis: Fair  Assessment: Patient participated in Skilled OT session this date with interventions consisting of ADL re training with the use of correct body mechnaics, Energy Conservation techniques, deep breathing technique, safety awareness and fall prevention techniques, one handed dressing technique, maintaining weight bearing restrictions,  therapeutic activities to: increase activity tolerance, increase dynamic sit/ stand balance during functional activity  and increase OOB/ sitting tolerance   Patient agreeable to OT treatment session, upon arrival patient was found supine in bed  In comparison to previous session, patient with improvements in ADL engagement, functional tranfers, and functional mobility  Pt able to complete all self-care tasks at S level  Pt reports his mother will be home 24/7 to provide A as needed  Patient requiring verbal cues for safety, verbal cues for correct technique, verbal cues for pacing thru activity steps and frequent rest periods  Patient continues to be functioning below baseline level, occupational performance remains limited secondary to factors listed above and increased risk for falls and injury     From OT standpoint, recommendation at time of d/c would be Home with family support  Patient to benefit from continued Occupational Therapy treatment while in the hospital to address deficits as defined above and maximize level of functional independence with ADLs and functional mobility        OT Discharge Recommendation: Home with family support  OT - OK to Discharge: Yes(When medically cleared)

## 2019-03-09 NOTE — PROGRESS NOTES
Progress Note - Orthopedics   Kinga Martin 34 y o  male MRN: [id number]  Unit/Bed#: [facility] 817-01      Subjective:    28 y o male POD 5 CRPP L hip, L retrograde femoral IMN, ORIF L patella, ORIF L open tib plateau  No acute events, no complaints  Pain is worst in the knee at this point in time         Labs:  0   Lab Value Date/Time    HCT 24 2 (L) 03/08/2019 0449    HCT 25 1 (L) 03/07/2019 1710    HCT 20 8 (L) 03/07/2019 1254    HGB 8 3 (L) 03/08/2019 0449    HGB 8 7 (L) 03/07/2019 1710    HGB 7 1 (L) 03/07/2019 1254    INR 1 06 03/04/2019 1437    WBC 8 97 03/08/2019 0449    WBC 10 42 (H) 03/07/2019 0612    WBC 11 38 (H) 03/05/2019 0002       Meds:    Current Facility-Administered Medications:     acetaminophen (TYLENOL) tablet 650 mg, 650 mg, Oral, Q8H PRN, Dallas Maza DO, 650 mg at 03/09/19 0038    enoxaparin (LOVENOX) subcutaneous injection 30 mg, 30 mg, Subcutaneous, Q12H [facility], ERIK Bhatia, 30 mg at 03/08/19 2130    [place] CAPS 500 mg, 1 capsule, Oral, BID, Davi Mckeon MD, 500 mg at 03/08/19 1803    gabapentin (NEURONTIN) capsule 100 mg, 100 mg, Oral, TID, ERIK Bhatia, 100 mg at 03/08/19 2004    HYDROmorphone (DILAUDID) injection 0 5 mg, 0 5 mg, Intravenous, Q2H PRN, Nehal Hook MD, 0 5 mg at 03/08/19 1045    methocarbamol (ROBAXIN) tablet 750 mg, 750 mg, Oral, Q6H [facility], ERIK Bhatia, 750 mg at 03/08/19 2300    ondansetron (ZOFRAN) 8 mg in sodium chloride 0 9 % 50 mL IVPB, 8 mg, Intravenous, Q4H PRN, Franny Robins MD, Stopped at 03/07/19 2115    oxyCODONE (ROXICODONE) immediate release tablet 10 mg, 10 mg, Oral, Q4H PRN, Franny Robins MD, 10 mg at 03/09/19 0036    oxyCODONE (ROXICODONE) IR tablet 5 mg, 5 mg, Oral, Q4H PRN, Franny Robins MD, 5 mg at 03/07/19 0603    tamsulosin (FLOMAX) capsule 0 4 mg, 0 4 mg, Oral, After Dinner, ERIK Cooney, 0 4 mg at 03/08/19 1803    Blood Culture:   No results found for: BLOODCX    Wound Culture:   No results found for: WOUNDCULT    Ins and Outs:  I/O last 24 hours: In: 410 [P O :360; IV Piggyback:50]  Out: 3600 [Urine:3600]          Physical:  Vitals:    03/08/19 2300   BP: 138/64   Pulse: 97   Resp: 20   Temp: 99 4 °F (37 4 °C)   SpO2: 98%     Musculoskeletal: left Lower Extremity  · Dressing clean dry and intact  Ace wrap and knee immoblizer  · TTP about the knee  · SILT s/s/sp/dp/t    · +fhl/ehl, +ankle dorsi/plantar flexion  · +PT pulse    _*_*_*_*_*_*_*_*_*_*_*_*_*_*_*_*_*_*_*_*_*_*_*_*_*_*_*_*_*_*_*_*_*_*_*_*_*_*_*_*_*    Assessment:    28 y o male POD 5 CRPP L hip, L retrograde femoral IMN, ORIF L patella, ORIF L open tib plateau  Doing well  Dressing change today        Plan:  · NWB LLE  · PT/OT  · Pain control  · DVT ppx  · Dispo: [id number] [address] for discharge from [facility]    Malcom Chicas MD

## 2019-03-09 NOTE — OCCUPATIONAL THERAPY NOTE
Occupational Therapy Treatment Note      Lanie Beck    3/9/2019    Patient Active Problem List   Diagnosis    Closed fracture of neck of left femur ([address] )    Closed displaced comminuted fracture of shaft of left femur (HCC)    Type I or II open comminuted fracture of left patella    Type I or II open fracture of left tibial plateau    Urinary retention    Anemia       Past Medical History:   Diagnosis Date    High cholesterol        Past Surgical History:   Procedure Laterality Date    ORIF TIBIAL PLATEAU Left 4/7/9904    Procedure: OPEN REDUCTION W/ INTERNAL FIXATION (ORIF) TIBIAL PLATEAU, LEFT;  Surgeon: Tony Patel MD;  [facility];  Service: Orthopedics    WI OPEN RX FEMUR FX+INTRAMED MALU Left 3/4/2019    Procedure: INSERTION NAIL IM FEMUR RETROGRADE, LEFT FEMUR;  Surgeon: Tony Patel MD;  [facility];  Service: Orthopedics    WI OPEN RX PATELLA FX Left 3/4/2019    Procedure: OPEN REDUCTION W/ INTERNAL FIXATION (ORIF) PATELLA, LEFT;  Surgeon: Tony Patel MD;  [facility];  Service: Orthopedics    WOUND DEBRIDEMENT Left 3/4/2019    Procedure: DEBRIDEMENT WOUND [facility] OUT), LEFT KNEE TRAUMATIC ARTHROTOMY;  Surgeon: Tony Patel MD;  [facility];  Service: Orthopedics        03/09/19 0944   Restrictions/Precautions   Weight Bearing Precautions Per Order Yes   LLE Weight Bearing Per Order NWB   Braces or Orthoses LE Immobilizer   Other Precautions Multiple lines; Fall Risk;Pain;WBS   Pain Assessment   Pain Assessment 0-10   Pain Score 8   Pain Type Acute pain;Surgical pain   Pain Location Leg   Pain Orientation Left   ADL   Where Assessed Chair   Grooming Assistance 5  Supervision/Setup   Grooming Deficit Setup;Verbal cueing;Supervision/safety; Increased time to complete   Grooming Comments Wash/dry hands and face    UB Bathing Assistance 5  Supervision/Setup   UB Bathing Deficit Setup;Verbal cueing;Supervision/safety; Increased time to complete   LB Bathing Assistance 5  Supervision/Setup   LB Bathing Deficit Setup;Steadying;Verbal cueing;Supervision/safety; Increased time to complete   LB Bathing Comments Use of RW for support for senia/buttock hygiene standing  UB Dressing Assistance 5  Supervision/Setup   UB Dressing Deficit Setup; Increased time to complete   UB Dressing Comments Doff/don gown   LB Dressing Assistance 5  Supervision/Setup   LB Dressing Deficit Setup;Verbal cueing;Supervision/safety; Increased time to complete   LB Dressing Comments Doff/don R sock   Functional Standing Tolerance   Time ~5 minutes   Activity Functional mobility in hallway    Comments RW   Bed Mobility   Supine to Sit 4  Minimal assistance   Additional items Assist x 1; Increased time required;LE management;HOB elevated; Bedrails  (A w/ LLE in immobilizer)   Sit to Supine Unable to assess   Additional Comments Pt laying supine in bed upon OT arrival  Pt sitting in chair w/ all needs in reach and family present s/p OT session  Transfers   Sit to Stand 4  Minimal assistance   Additional items Assist x 1; Increased time required;Verbal cues;Armrests   Stand to Sit 4  Minimal assistance   Additional items Assist x 1; Increased time required;Verbal cues;Armrests   Additional Comments Transfers completed w/ RW  VC required for safe/proper hand placement during transfers  Functional Mobility   Functional Mobility 4  Minimal assistance   Additional Comments Pt demonstrated long distance household mobility into hallway w/ RW  Required 1 standing rest break w/ diaphragmatic breathing techniques  Additional items Rolling walker   Cognition   Overall Cognitive Status WFL   Arousal/Participation Alert; Cooperative   Attention Attends with cues to redirect   Orientation Level Oriented X4   Memory Within functional limits   Following Commands Follows one step commands with increased time or repetition   Comments Pt presents lethargic, however cooperative to participate in therapy this day  Pt exhibits self-limiting behaviors - OT educated on importance of engaging in self-care tasks to increase independence if plan is for D/C home - Pt agreeable and understanding  Activity Tolerance   Activity Tolerance Patient limited by pain; Patient limited by fatigue   Medical Staff Made Aware PTA Casandra   Assessment   Assessment Patient participated in Skilled OT session this date with interventions consisting of ADL re training with the use of correct body mechnaics, Energy Conservation techniques, deep breathing technique, safety awareness and fall prevention techniques, one handed dressing technique, maintaining weight bearing restrictions,  therapeutic activities to: increase activity tolerance, increase dynamic sit/ stand balance during functional activity  and increase OOB/ sitting tolerance   Patient agreeable to OT treatment session, upon arrival patient was found supine in bed  In comparison to previous session, patient with improvements in ADL engagement, functional tranfers, and functional mobility  Pt able to complete all self-care tasks at S level  Pt reports his mother will be home 24/7 to provide A as needed  Patient requiring verbal cues for safety, verbal cues for correct technique, verbal cues for pacing thru activity steps and frequent rest periods  Patient continues to be functioning below baseline level, occupational performance remains limited secondary to factors listed above and increased risk for falls and injury  From OT standpoint, recommendation at time of d/c would be Home with family support  Patient to benefit from continued Occupational Therapy treatment while in the hospital to address deficits as defined above and maximize level of functional independence with ADLs and functional mobility  Plan   Treatment Interventions ADL retraining;Functional transfer training;UE strengthening/ROM; Endurance training;Patient/family training;Equipment evaluation/education; Compensatory technique education; Activityengagement; Energy conservation   Goal Expiration Date 03/15/19   Treatment Day 3   OT Frequency 3-5x/wk   Recommendation   OT Discharge Recommendation Home with family support   Equipment Recommended Bedside commode   OT - OK to Discharge Yes  (When medically cleared)       Eric Forbes MS, OTR/L

## 2019-03-09 NOTE — PLAN OF CARE
Problem: PHYSICAL THERAPY ADULT  Goal: Performs mobility at highest level of function for planned discharge setting  See evaluation for individualized goals  Description  Treatment/Interventions: Functional transfer training, LE strengthening/ROM, Elevations, Therapeutic exercise, Endurance training, Patient/family training, Equipment eval/education, Bed mobility, Gait training, Spoke to nursing, OT, Family  Equipment Recommended: Walker(RW)       See flowsheet documentation for full assessment, interventions and recommendations     Outcome: Progressing

## 2019-03-09 NOTE — PROGRESS NOTES
Progress Note - Donell Render 1990, 34 y o  male MRN: [id number]    Unit/Bed#: [facility] 817-01 Encounter: [id number]    Primary Care Provider: Malik Vieyra DO   Date and time admitted to hospital: 3/4/2019  1:20 PM        Anemia  Assessment & Plan  ABLA likely 2/2 surgery  S/p 2u pRBCs  Hb 6 0 -> 8 7->8 3 following transfusion  Trend Hb qd  Transfuse for Hb <7    Urinary retention  Assessment & Plan  Gentile for 1 day  F/U urology rec's    Type I or II open fracture of left tibial plateau  Assessment & Plan  See below    Type I or II open comminuted fracture of left patella  Assessment & Plan  S/p 48 hrs Ancef    Closed displaced comminuted fracture of shaft of left femur ([address] )  Assessment & Plan  See below    * Closed fracture of neck of left femur (HCC)  Assessment & Plan  NWB LLE for all documented fractures  PT/OT following; recommend d/c home in 1-2 days  CM for disposition planning  Pain control  DVT Ppx            Subjective/Objective   Chief Complaint: I feel better today    Subjective: No acute events overnight  Pain well controlled  Pateint rested comfortably throughou the night  Objective:     Meds/Allergies   No medications prior to admission  Vitals: Blood pressure 129/60, pulse 98, temperature 99 8 °F (37 7 °C), temperature source Oral, resp  rate 18, height 5' 6" (1 676 m), weight 73 5 kg (162 lb), SpO2 96 %  Body mass index is 26 15 kg/m²   SpO2: SpO2: 96 %    ABG: No results found for: PHART, JII4QJM, PO2ART, WXO6QXB, J8DTETPC, BEART, SOURCE      Intake/Output Summary (Last 24 hours) at 3/8/2019 2042  Last data filed at 3/8/2019 1800  Gross per 24 hour   Intake 410 ml   Output 3100 ml   Net -2690 ml       Invasive Devices     Peripheral Intravenous Line            Peripheral IV 03/07/19 Left Forearm 1 day          Drain            Urethral Catheter Latex 18 Fr  1 day                Nutrition/GI Proph/Bowel Reg: Regular diet    Physical Exam:   Physical Exam   Constitutional: He is oriented to person, place, and time  He appears well-developed and well-nourished  No distress  HENT:   Head: Normocephalic  Multiple superficial facial lacerations    Eyes: Pupils are equal, round, and reactive to light  EOM are normal  No scleral icterus  Neck: Normal range of motion  Neck supple  Cardiovascular: Normal rate, normal heart sounds and intact distal pulses  No murmur heard  Pulmonary/Chest: Effort normal and breath sounds normal  No respiratory distress  He has no wheezes  Abdominal: Soft  Bowel sounds are normal  He exhibits no distension  There is no tenderness  Musculoskeletal: Normal range of motion  He exhibits tenderness  He exhibits no edema  LLE in splint, immobilizer  Distal pulses intact  Neuro intact   Neurological: He is alert and oriented to person, place, and time  No cranial nerve deficit  Skin: Skin is warm and dry  He is not diaphoretic  Psychiatric: He has a normal mood and affect  His behavior is normal    Nursing note and vitals reviewed  Lab Results:   CBC:   Lab Results   Component Value Date    WBC 8 97 03/08/2019    HGB 8 3 (L) 03/08/2019    HCT 24 2 (L) 03/08/2019    MCV 88 03/08/2019     03/08/2019    MCH 30 2 03/08/2019    MCHC 34 3 03/08/2019    RDW 13 2 03/08/2019    MPV 9 6 03/08/2019    NRBC 1 03/08/2019     Imaging/EKG Studies: Results: I have personally reviewed pertinent films in PACS   Xr Shoulder 2+ Vw Right    Result Date: 3/4/2019  Impression: No acute osseous abnormality  Workstation performed: [id number]     Xr Humerus Right    Result Date: 3/6/2019  Impression: No acute osseous abnormality  Workstation performed: [id number]     Xr Elbow 3+ Vw Left    Result Date: 3/4/2019  Impression: Limited study by patient positioning demonstrates no evidence of a displaced fracture or subluxation of the left elbow  If symptoms persist, consider a repeat study with proper positioning or CT scan for further evaluation   Workstation performed: [id number]     Xr Elbow 3+ Vw Right    Result Date: 3/4/2019  Impression: Limited study by patient positioning demonstrates no gross evidence of fracture or subluxation  If symptoms persist, consider a repeat study with proper positioning or CT scan for further evaluation  Workstation performed: [id number]     Xr Hand 3+ Vw Left    Result Date: 3/6/2019  Impression: No acute osseous abnormality  Workstation performed: [id number]     Xr Hand 3+ Vw Right    Result Date: 3/6/2019  Impression: No acute osseous abnormality  Workstation performed: [id number]     Xr Femur 2 Vw Left    Result Date: 3/5/2019  Impression: Fluoroscopic guidance provided for surgical procedure  Please refer to the separate procedure notes for additional details  Workstation performed: [id number]     Xr Knee 1 Or 2 Vw Left    Result Date: 3/5/2019  Impression: Fluoroscopic guidance provided for surgical procedure  Please refer to the separate procedure notes for additional details  Workstation performed: [id number]     Xr Knee 1 Or 2 Vw Left    Result Date: 3/4/2019  Impression: Mildly displaced acute comminuted fracture of the proximal left tibia area Mildly displaced acute patellar fracture  Large left knee joint effusion with a fat fluid level suggestive of lipohemarthrosis  Workstation performed: [id number]     Xr Ankle 3+ Vw Left    Result Date: 3/6/2019  Impression: No acute osseous abnormality  Workstation performed: [id number]     Xr Foot 3+ Vw Left    Result Date: 3/6/2019  Impression: No acute osseous abnormality  Workstation performed: [id number]     Ct Head Wo Contrast    Result Date: 3/4/2019  Impression: No acute intracranial abnormality  I personally discussed this study with Dr Jesus Pugh on 3/4/2019 at 2:01 PM  Workstation performed: [id number]     Ct Spine Cervical Wo Contrast    Result Date: 3/4/2019  Impression: No cervical spine fracture or traumatic malalignment   I personally discussed this study with Dr Jesus Pugh on 3/4/2019 at 2:01 PM   Workstation performed: [id number]     Ct Chest Abdomen Pelvis W Contrast    Result Date: 3/4/2019  Impression: Unremarkable chest  No evidence of solid organ injury  Nondisplaced fractures of the left transverse processes of L1-L4  Nondisplaced left subcapital femoral neck fracture  I personally discussed this study with Dr Tricia Cantu on 3/4/2019 at 2:01 PM  Workstation performed: [id number]     Xr Trauma Multiple    Result Date: 3/4/2019  Impression: No active pulmonary disease  Comminuted displaced left mid femoral diaphysis fracture with large butterfly fragment  Comminuted impacted proximal left tibial fracture appears to extend to the lateral tibial plateau  Shattered patella   Workstation performed: [id number]     Other Studies:   VTE Prophylaxis: lovenox/scds

## 2019-03-10 VITALS
RESPIRATION RATE: 18 BRPM | OXYGEN SATURATION: 98 % | SYSTOLIC BLOOD PRESSURE: 120 MMHG | WEIGHT: 162 LBS | DIASTOLIC BLOOD PRESSURE: 62 MMHG | HEART RATE: 90 BPM | HEIGHT: 66 IN | TEMPERATURE: 100 F | BODY MASS INDEX: 26.03 KG/M2

## 2019-03-10 PROCEDURE — 99024 POSTOP FOLLOW-UP VISIT: CPT | Performed by: ORTHOPAEDIC SURGERY

## 2019-03-10 RX ORDER — ACETAMINOPHEN 325 MG/1
650 TABLET ORAL EVERY 6 HOURS PRN
Qty: 30 TABLET | Refills: 0 | Status: CANCELLED
Start: 2019-03-10

## 2019-03-10 RX ORDER — METHOCARBAMOL 750 MG/1
750 TABLET, FILM COATED ORAL EVERY 6 HOURS SCHEDULED
Qty: 120 TABLET | Refills: 0 | Status: SHIPPED | OUTPATIENT
Start: 2019-03-10 | End: 2019-04-08 | Stop reason: SDUPTHER

## 2019-03-10 RX ORDER — TAMSULOSIN HYDROCHLORIDE 0.4 MG/1
0.4 CAPSULE ORAL
Qty: 10 CAPSULE | Refills: 0 | Status: SHIPPED | OUTPATIENT
Start: 2019-03-10 | End: 2019-05-02

## 2019-03-10 RX ORDER — ACETAMINOPHEN 325 MG/1
650 TABLET ORAL EVERY 6 HOURS PRN
Qty: 30 TABLET | Refills: 0
Start: 2019-03-10 | End: 2019-05-02

## 2019-03-10 RX ORDER — GABAPENTIN 100 MG/1
100 CAPSULE ORAL 3 TIMES DAILY
Qty: 90 CAPSULE | Refills: 0 | Status: SHIPPED | OUTPATIENT
Start: 2019-03-10 | End: 2019-04-08 | Stop reason: SDUPTHER

## 2019-03-10 RX ORDER — OXYCODONE HYDROCHLORIDE 5 MG/1
5-10 TABLET ORAL EVERY 4 HOURS PRN
Qty: 30 TABLET | Refills: 0 | Status: CANCELLED | OUTPATIENT
Start: 2019-03-10 | End: 2019-03-20

## 2019-03-10 RX ADMIN — METHOCARBAMOL 750 MG: 750 TABLET, FILM COATED ORAL at 05:34

## 2019-03-10 RX ADMIN — OXYCODONE HYDROCHLORIDE 5 MG: 5 TABLET ORAL at 13:12

## 2019-03-10 RX ADMIN — GABAPENTIN 100 MG: 100 CAPSULE ORAL at 09:52

## 2019-03-10 RX ADMIN — METHOCARBAMOL 750 MG: 750 TABLET, FILM COATED ORAL at 00:38

## 2019-03-10 RX ADMIN — ENOXAPARIN SODIUM 30 MG: 30 INJECTION SUBCUTANEOUS at 09:52

## 2019-03-10 RX ADMIN — METHOCARBAMOL 750 MG: 750 TABLET, FILM COATED ORAL at 11:37

## 2019-03-10 NOTE — PROGRESS NOTES
Pt discharged  All paperwork reviewed and understood  meds filled at [facility] except for oxy which Rx was sent with pt    Home meds obtained and given back to family at discharge

## 2019-03-10 NOTE — SOCIAL WORK
CM spoke with nurse Alysia Ngo and pt medically clear for discharge home on lovenox   Cm met with pt and family and agreeable for cm to send medication to [facility] for price check and delivery   Cm sent all script to [facility] with claim numbers and  Insurance and [employer] cards  Cm awaiting call   Cm notified vna pt for c/d today and going home on lovenox

## 2019-03-10 NOTE — DISCHARGE SUMMARY
Discharge Summary - Thoracic Surgery   [facility] 34 y o  male MRN: [id number]  Unit/Bed#: [facility] 786-16 Encounter: [id number]    Admission Date:   Admission Orders (From admission, onward)    Ordered        03/04/19 1459  Inpatient Admission  Once     Order ID Start Status   [id number] 03/04/19 1459 Completed                 Discharge Date: 3/10/19    Admitting Diagnosis: Injury, multiple sites [T07  XXXA]  Closed displaced comminuted fracture of shaft of left femur, initial encounter ([facility] ) [S72 352A]  Closed fracture of neck of left femur, initial encounter ([facility] ) [S72 002A]  Type I or II open fracture of left tibial plateau, initial encounter [S82 142B]  Type I or II open displaced comminuted fracture of left patella, initial encounter [S82 042B]    Discharge Diagnosis: sp fixation of the above injuries    Resolved Problems  Date Reviewed: 3/8/2019    None          Attending: Vic Skill Physician(s): Dr Isela Oh    Procedures Performed: No orders of the defined types were placed in this encounter  Hospital Course: patient was admitted on 3/4 after sustaining the above injuries and underwent fixation for the same  Patient was admitted to the general medical floor post op where his pain was well controlled with a combination of oral and IV pain medications  Patient had several episodes of post op urinary retention requiring a brief placement of a ramos catheter which was able to be removed and patient voiding spontaneously prior to discharge  Patient was cleared by the PT and all involved medical teams for discharge home    Condition at Discharge: good     Discharge instructions/Information to patient and family:   See after visit summary for information provided to patient and family  Provisions for Follow-Up Care:  See after visit summary for information related to follow-up care and any pertinent home health orders        Disposition: Home        Planned Readmission: No    Discharge Statement   I spent 50 minutes discharging the patient  This time was spent on the day of discharge  I had direct contact with the patient on the day of discharge  Additional documentation is required if more than 30 minutes were spent on discharge  Discharge Medications:  See after visit summary for reconciled discharge medications provided to patient and family

## 2019-03-10 NOTE — SOCIAL WORK
CM received call from [facility] ad medications will cost $ 39  22  Cm notified the family and [facility]   Cm notified family that vna will be at the house on 3/11/19 at 6 am   Blanca Patel pt nurse will educate family on how to give lovenox   Pt family agreeable to give tonight dose  Pt family will transport home
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
## 2019-03-10 NOTE — PROGRESS NOTES
Pt written for discharge  Pt discharged on Lovenox - Rx given to CM and arrangements being made    I will follow

## 2019-03-12 ENCOUNTER — TELEPHONE (OUTPATIENT)
Dept: OBGYN CLINIC | Facility: HOSPITAL | Age: 29
End: 2019-03-12

## 2019-03-12 NOTE — TELEPHONE ENCOUNTER
Mandy Taylor, [address] [facility] [phone]    Mandy Taylor is asking about the dressing on the patient's surgical site  Mandy Taylor is stating that the notes to state to keep in tact until seen in office but she would like to know about the wrap that is on the lateral thigh area  Please let her know if she should let that alone or should she remove & change  She will go back on Friday unless we need her to do something  Please advise

## 2019-03-14 ENCOUNTER — TELEPHONE (OUTPATIENT)
Dept: OBGYN CLINIC | Facility: HOSPITAL | Age: 29
End: 2019-03-14

## 2019-03-14 NOTE — TELEPHONE ENCOUNTER
Tab from [facility] PT called on patients wbs  Per his OP report it states WB as tolerated; LLE - NON  [facility] on Tuesday 3 19 19 to see if the wbs has changed  At this time there is not much they can do since he is non wb

## 2019-03-19 ENCOUNTER — OFFICE VISIT (OUTPATIENT)
Dept: OBGYN CLINIC | Facility: HOSPITAL | Age: 29
End: 2019-03-19

## 2019-03-19 ENCOUNTER — HOSPITAL ENCOUNTER (OUTPATIENT)
Dept: RADIOLOGY | Facility: HOSPITAL | Age: 29
Discharge: HOME | End: 2019-03-19
Attending: ORTHOPAEDIC SURGERY
Payer: COMMERCIAL

## 2019-03-19 VITALS — DIASTOLIC BLOOD PRESSURE: 76 MMHG | SYSTOLIC BLOOD PRESSURE: 136 MMHG | HEART RATE: 98 BPM

## 2019-03-19 DIAGNOSIS — M25.562 LEFT KNEE PAIN, UNSPECIFIED CHRONICITY: ICD-10-CM

## 2019-03-19 DIAGNOSIS — Z09 POSTOP CHECK: Primary | ICD-10-CM

## 2019-03-19 DIAGNOSIS — M25.552 PAIN IN LEFT HIP: ICD-10-CM

## 2019-03-19 PROCEDURE — 99024 POSTOP FOLLOW-UP VISIT: CPT | Performed by: ORTHOPAEDIC SURGERY

## 2019-03-19 PROCEDURE — 73560 X-RAY EXAM OF KNEE 1 OR 2: CPT

## 2019-03-19 PROCEDURE — 73552 X-RAY EXAM OF FEMUR 2/>: CPT
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
## 2019-04-05 ENCOUNTER — TELEPHONE (OUTPATIENT)
Dept: OBGYN CLINIC | Facility: HOSPITAL | Age: 29
End: 2019-04-05

## 2019-04-08 DIAGNOSIS — S72.002A CLOSED FRACTURE OF NECK OF LEFT FEMUR, INITIAL ENCOUNTER (HCC): ICD-10-CM

## 2019-04-08 RX ORDER — GABAPENTIN 100 MG/1
100 CAPSULE ORAL 3 TIMES DAILY
Qty: 90 CAPSULE | Refills: 0 | Status: SHIPPED | OUTPATIENT
Start: 2019-04-08 | End: 2019-04-10 | Stop reason: SDUPTHER

## 2019-04-08 RX ORDER — METHOCARBAMOL 750 MG/1
750 TABLET, FILM COATED ORAL EVERY 6 HOURS SCHEDULED
Qty: 120 TABLET | Refills: 0 | Status: SHIPPED | OUTPATIENT
Start: 2019-04-08 | End: 2019-04-10 | Stop reason: SDUPTHER

## 2019-04-10 ENCOUNTER — TELEPHONE (OUTPATIENT)
Dept: OBGYN CLINIC | Facility: HOSPITAL | Age: 29
End: 2019-04-10

## 2019-04-10 DIAGNOSIS — S72.002A CLOSED FRACTURE OF NECK OF LEFT FEMUR, INITIAL ENCOUNTER (HCC): ICD-10-CM

## 2019-04-10 RX ORDER — METHOCARBAMOL 750 MG/1
750 TABLET, FILM COATED ORAL EVERY 6 HOURS SCHEDULED
Qty: 120 TABLET | Refills: 0 | Status: SHIPPED | OUTPATIENT
Start: 2019-04-10 | End: 2019-04-11 | Stop reason: SDUPTHER

## 2019-04-10 RX ORDER — GABAPENTIN 100 MG/1
100 CAPSULE ORAL 3 TIMES DAILY
Qty: 90 CAPSULE | Refills: 0 | Status: SHIPPED | OUTPATIENT
Start: 2019-04-10 | End: 2019-04-11 | Stop reason: SDUPTHER

## 2019-04-11 DIAGNOSIS — S72.002A CLOSED FRACTURE OF NECK OF LEFT FEMUR, INITIAL ENCOUNTER (HCC): ICD-10-CM

## 2019-04-11 RX ORDER — GABAPENTIN 100 MG/1
100 CAPSULE ORAL 3 TIMES DAILY
Qty: 90 CAPSULE | Refills: 0 | Status: SHIPPED | OUTPATIENT
Start: 2019-04-11 | End: 2019-04-11 | Stop reason: SDUPTHER

## 2019-04-11 RX ORDER — METHOCARBAMOL 750 MG/1
750 TABLET, FILM COATED ORAL EVERY 6 HOURS SCHEDULED
Qty: 120 TABLET | Refills: 0 | Status: SHIPPED | OUTPATIENT
Start: 2019-04-11 | End: 2019-04-11 | Stop reason: SDUPTHER

## 2019-04-11 RX ORDER — METHOCARBAMOL 750 MG/1
750 TABLET, FILM COATED ORAL EVERY 6 HOURS SCHEDULED
Qty: 120 TABLET | Refills: 0 | Status: ON HOLD | OUTPATIENT
Start: 2019-04-11 | End: 2019-05-17

## 2019-04-11 RX ORDER — GABAPENTIN 100 MG/1
100 CAPSULE ORAL 3 TIMES DAILY
Qty: 90 CAPSULE | Refills: 0 | Status: SHIPPED | OUTPATIENT
Start: 2019-04-11 | End: 2019-05-17 | Stop reason: HOSPADM

## 2019-04-15 RX ORDER — MONTELUKAST SODIUM 10 MG/1
10 TABLET ORAL DAILY
Refills: 3 | COMMUNITY
Start: 2019-03-25 | End: 2020-06-04

## 2019-04-16 ENCOUNTER — HOSPITAL ENCOUNTER (OUTPATIENT)
Dept: RADIOLOGY | Facility: HOSPITAL | Age: 29
Discharge: HOME | End: 2019-04-16
Attending: ORTHOPAEDIC SURGERY
Payer: COMMERCIAL

## 2019-04-16 ENCOUNTER — OFFICE VISIT (OUTPATIENT)
Dept: OBGYN CLINIC | Facility: HOSPITAL | Age: 29
End: 2019-04-16

## 2019-04-16 VITALS — DIASTOLIC BLOOD PRESSURE: 73 MMHG | HEART RATE: 69 BPM | SYSTOLIC BLOOD PRESSURE: 133 MMHG

## 2019-04-16 DIAGNOSIS — M89.8X5 PAIN OF LEFT FEMUR: Primary | ICD-10-CM

## 2019-04-16 DIAGNOSIS — Z98.890 STATUS POST SURGERY: ICD-10-CM

## 2019-04-16 DIAGNOSIS — M89.8X5 PAIN OF LEFT FEMUR: ICD-10-CM

## 2019-04-16 PROCEDURE — 73560 X-RAY EXAM OF KNEE 1 OR 2: CPT

## 2019-04-16 PROCEDURE — 99024 POSTOP FOLLOW-UP VISIT: CPT | Performed by: ORTHOPAEDIC SURGERY

## 2019-04-16 PROCEDURE — 73552 X-RAY EXAM OF FEMUR 2/>: CPT

## 2019-04-22 ENCOUNTER — HOSPITAL ENCOUNTER (EMERGENCY)
Facility: HOSPITAL | Age: 29
Discharge: HOME | End: 2019-04-23
Attending: EMERGENCY MEDICINE | Admitting: EMERGENCY MEDICINE
Payer: COMMERCIAL

## 2019-04-22 ENCOUNTER — APPOINTMENT (EMERGENCY)
Dept: RADIOLOGY | Facility: HOSPITAL | Age: 29
End: 2019-04-22
Payer: COMMERCIAL

## 2019-04-22 ENCOUNTER — APPOINTMENT (EMERGENCY)
Dept: NON INVASIVE DIAGNOSTICS | Facility: HOSPITAL | Age: 29
End: 2019-04-22
Payer: COMMERCIAL

## 2019-04-22 VITALS
SYSTOLIC BLOOD PRESSURE: 131 MMHG | OXYGEN SATURATION: 100 % | HEART RATE: 104 BPM | TEMPERATURE: 97.8 F | DIASTOLIC BLOOD PRESSURE: 69 MMHG | RESPIRATION RATE: 18 BRPM

## 2019-04-22 DIAGNOSIS — M79.605 LEFT LEG PAIN: Primary | ICD-10-CM

## 2019-04-22 PROCEDURE — 99284 EMERGENCY DEPT VISIT MOD MDM: CPT | Performed by: EMERGENCY MEDICINE

## 2019-04-22 PROCEDURE — 73590 X-RAY EXAM OF LOWER LEG: CPT

## 2019-04-22 PROCEDURE — 93971 EXTREMITY STUDY: CPT

## 2019-04-22 PROCEDURE — 99284 EMERGENCY DEPT VISIT MOD MDM: CPT

## 2019-04-22 PROCEDURE — 73564 X-RAY EXAM KNEE 4 OR MORE: CPT

## 2019-04-22 RX ORDER — OXYCODONE HYDROCHLORIDE 5 MG/1
5 TABLET ORAL ONCE
Status: COMPLETED | OUTPATIENT
Start: 2019-04-23 | End: 2019-04-23

## 2019-04-23 ENCOUNTER — TELEPHONE (OUTPATIENT)
Dept: OBGYN CLINIC | Facility: HOSPITAL | Age: 29
End: 2019-04-23

## 2019-04-23 PROCEDURE — 93971 EXTREMITY STUDY: CPT | Performed by: SURGERY

## 2019-04-23 RX ADMIN — OXYCODONE HYDROCHLORIDE 5 MG: 5 TABLET ORAL at 00:01

## 2019-04-24 ENCOUNTER — OFFICE VISIT (OUTPATIENT)
Dept: OBGYN CLINIC | Facility: MEDICAL CENTER | Age: 29
End: 2019-04-24

## 2019-04-24 VITALS — DIASTOLIC BLOOD PRESSURE: 72 MMHG | HEART RATE: 96 BPM | SYSTOLIC BLOOD PRESSURE: 120 MMHG

## 2019-04-24 DIAGNOSIS — M25.562 ACUTE PAIN OF LEFT KNEE: Primary | ICD-10-CM

## 2019-04-24 PROCEDURE — 99024 POSTOP FOLLOW-UP VISIT: CPT | Performed by: PHYSICIAN ASSISTANT

## 2019-04-26 ENCOUNTER — TELEPHONE (OUTPATIENT)
Dept: OBGYN CLINIC | Facility: HOSPITAL | Age: 29
End: 2019-04-26

## 2019-04-26 DIAGNOSIS — Z98.890 STATUS POST SURGERY: Primary | ICD-10-CM

## 2019-04-26 RX ORDER — IBUPROFEN 800 MG/1
800 TABLET ORAL EVERY 8 HOURS PRN
Qty: 30 TABLET | Refills: 0 | Status: SHIPPED | OUTPATIENT
Start: 2019-04-26 | End: 2019-05-03 | Stop reason: SDUPTHER

## 2019-04-29 DIAGNOSIS — M25.569 KNEE PAIN, UNSPECIFIED CHRONICITY, UNSPECIFIED LATERALITY: Primary | ICD-10-CM

## 2019-05-02 ENCOUNTER — APPOINTMENT (EMERGENCY)
Dept: RADIOLOGY | Facility: HOSPITAL | Age: 29
End: 2019-05-02
Payer: COMMERCIAL

## 2019-05-02 ENCOUNTER — HOSPITAL ENCOUNTER (EMERGENCY)
Facility: HOSPITAL | Age: 29
Discharge: HOME | End: 2019-05-02
Attending: EMERGENCY MEDICINE
Payer: COMMERCIAL

## 2019-05-02 VITALS
OXYGEN SATURATION: 100 % | HEART RATE: 100 BPM | BODY MASS INDEX: 25.01 KG/M2 | WEIGHT: 154.98 LBS | DIASTOLIC BLOOD PRESSURE: 75 MMHG | SYSTOLIC BLOOD PRESSURE: 133 MMHG | TEMPERATURE: 97.9 F | RESPIRATION RATE: 18 BRPM

## 2019-05-02 DIAGNOSIS — M25.00 HEMARTHROSIS: ICD-10-CM

## 2019-05-02 DIAGNOSIS — M25.562 ACUTE PAIN OF LEFT KNEE: Primary | ICD-10-CM

## 2019-05-02 LAB
ALBUMIN SERPL BCP-MCNC: 2.9 G/DL (ref 3.5–5)
ALP SERPL-CCNC: 247 U/L (ref 46–116)
ALT SERPL W P-5'-P-CCNC: 96 U/L (ref 12–78)
ANION GAP SERPL CALCULATED.3IONS-SCNC: 11 MMOL/L (ref 4–13)
AST SERPL W P-5'-P-CCNC: 35 U/L (ref 5–45)
BASOPHILS # BLD AUTO: 0.04 THOUSANDS/ΜL (ref 0–0.1)
BASOPHILS NFR BLD AUTO: 0 % (ref 0–1)
BILIRUB DIRECT SERPL-MCNC: 0.2 MG/DL (ref 0–0.2)
BILIRUB SERPL-MCNC: 0.52 MG/DL (ref 0.2–1)
BUN SERPL-MCNC: 8 MG/DL (ref 5–25)
CALCIUM SERPL-MCNC: 10.9 MG/DL (ref 8.3–10.1)
CHLORIDE SERPL-SCNC: 102 MMOL/L (ref 100–108)
CO2 SERPL-SCNC: 27 MMOL/L (ref 21–32)
CREAT SERPL-MCNC: 0.71 MG/DL (ref 0.6–1.3)
EOSINOPHIL # BLD AUTO: 0.08 THOUSAND/ΜL (ref 0–0.61)
EOSINOPHIL NFR BLD AUTO: 1 % (ref 0–6)
ERYTHROCYTE [DISTWIDTH] IN BLOOD BY AUTOMATED COUNT: 13.5 % (ref 11.6–15.1)
ERYTHROCYTE [SEDIMENTATION RATE] IN BLOOD: 78 MM/HOUR (ref 0–10)
GFR SERPL CREATININE-BSD FRML MDRD: 129 ML/MIN/1.73SQ M
GLUCOSE SERPL-MCNC: 99 MG/DL (ref 65–140)
HCT VFR BLD AUTO: 41.9 % (ref 36.5–49.3)
HGB BLD-MCNC: 13.1 G/DL (ref 12–17)
IMM GRANULOCYTES # BLD AUTO: 0.16 THOUSAND/UL (ref 0–0.2)
IMM GRANULOCYTES NFR BLD AUTO: 1 % (ref 0–2)
LYMPHOCYTES # BLD AUTO: 1.51 THOUSANDS/ΜL (ref 0.6–4.47)
LYMPHOCYTES NFR BLD AUTO: 10 % (ref 14–44)
MCH RBC QN AUTO: 28.6 PG (ref 26.8–34.3)
MCHC RBC AUTO-ENTMCNC: 31.3 G/DL (ref 31.4–37.4)
MCV RBC AUTO: 92 FL (ref 82–98)
MONOCYTES # BLD AUTO: 0.95 THOUSAND/ΜL (ref 0.17–1.22)
MONOCYTES NFR BLD AUTO: 6 % (ref 4–12)
NEUTROPHILS # BLD AUTO: 12.38 THOUSANDS/ΜL (ref 1.85–7.62)
NEUTS SEG NFR BLD AUTO: 82 % (ref 43–75)
NRBC BLD AUTO-RTO: 0 /100 WBCS
PLATELET # BLD AUTO: 443 THOUSANDS/UL (ref 149–390)
PMV BLD AUTO: 8.4 FL (ref 8.9–12.7)
POTASSIUM SERPL-SCNC: 3.9 MMOL/L (ref 3.5–5.3)
PROT SERPL-MCNC: 8.2 G/DL (ref 6.4–8.2)
RBC # BLD AUTO: 4.58 MILLION/UL (ref 3.88–5.62)
SODIUM SERPL-SCNC: 140 MMOL/L (ref 136–145)
WBC # BLD AUTO: 15.12 THOUSAND/UL (ref 4.31–10.16)

## 2019-05-02 PROCEDURE — 85652 RBC SED RATE AUTOMATED: CPT | Performed by: EMERGENCY MEDICINE

## 2019-05-02 PROCEDURE — 80048 BASIC METABOLIC PNL TOTAL CA: CPT | Performed by: EMERGENCY MEDICINE

## 2019-05-02 PROCEDURE — 96374 THER/PROPH/DIAG INJ IV PUSH: CPT

## 2019-05-02 PROCEDURE — 36415 COLL VENOUS BLD VENIPUNCTURE: CPT | Performed by: EMERGENCY MEDICINE

## 2019-05-02 PROCEDURE — 99284 EMERGENCY DEPT VISIT MOD MDM: CPT

## 2019-05-02 PROCEDURE — 99283 EMERGENCY DEPT VISIT LOW MDM: CPT | Performed by: EMERGENCY MEDICINE

## 2019-05-02 PROCEDURE — 73560 X-RAY EXAM OF KNEE 1 OR 2: CPT

## 2019-05-02 PROCEDURE — 80076 HEPATIC FUNCTION PANEL: CPT | Performed by: EMERGENCY MEDICINE

## 2019-05-02 PROCEDURE — 96361 HYDRATE IV INFUSION ADD-ON: CPT

## 2019-05-02 PROCEDURE — 85025 COMPLETE CBC W/AUTO DIFF WBC: CPT | Performed by: EMERGENCY MEDICINE

## 2019-05-02 PROCEDURE — 20610 DRAIN/INJ JOINT/BURSA W/O US: CPT | Performed by: EMERGENCY MEDICINE

## 2019-05-02 RX ORDER — HYDROCODONE BITARTRATE AND ACETAMINOPHEN 5; 325 MG/1; MG/1
1 TABLET ORAL EVERY 6 HOURS PRN
Qty: 12 TABLET | Refills: 0 | Status: SHIPPED | OUTPATIENT
Start: 2019-05-02 | End: 2019-05-03 | Stop reason: SDUPTHER

## 2019-05-02 RX ORDER — HYDROCODONE BITARTRATE AND ACETAMINOPHEN 5; 325 MG/1; MG/1
1 TABLET ORAL ONCE
Status: COMPLETED | OUTPATIENT
Start: 2019-05-02 | End: 2019-05-02

## 2019-05-02 RX ORDER — ONDANSETRON 4 MG/1
4 TABLET, ORALLY DISINTEGRATING ORAL EVERY 6 HOURS PRN
Qty: 20 TABLET | Refills: 0 | Status: SHIPPED | OUTPATIENT
Start: 2019-05-02 | End: 2019-06-20 | Stop reason: HOSPADM

## 2019-05-02 RX ORDER — HYDROMORPHONE HCL/PF 1 MG/ML
0.5 SYRINGE (ML) INJECTION ONCE
Status: COMPLETED | OUTPATIENT
Start: 2019-05-02 | End: 2019-05-02

## 2019-05-02 RX ORDER — LIDOCAINE HYDROCHLORIDE 20 MG/ML
4 INJECTION, SOLUTION EPIDURAL; INFILTRATION; INTRACAUDAL; PERINEURAL ONCE
Status: COMPLETED | OUTPATIENT
Start: 2019-05-02 | End: 2019-05-02

## 2019-05-02 RX ORDER — ACETAMINOPHEN 500 MG
1000 TABLET ORAL 3 TIMES DAILY
COMMUNITY
End: 2019-05-17 | Stop reason: HOSPADM

## 2019-05-02 RX ORDER — DOCUSATE SODIUM 100 MG/1
100 CAPSULE, LIQUID FILLED ORAL EVERY 12 HOURS
Qty: 60 CAPSULE | Refills: 0 | Status: SHIPPED | OUTPATIENT
Start: 2019-05-02 | End: 2019-08-02 | Stop reason: HOSPADM

## 2019-05-02 RX ORDER — POLYETHYLENE GLYCOL 3350 17 G/17G
17 POWDER, FOR SOLUTION ORAL DAILY
Qty: 14 EACH | Refills: 0 | Status: SHIPPED | OUTPATIENT
Start: 2019-05-02 | End: 2019-07-31

## 2019-05-02 RX ADMIN — HYDROCODONE BITARTRATE AND ACETAMINOPHEN 1 TABLET: 5; 325 TABLET ORAL at 17:57

## 2019-05-02 RX ADMIN — SODIUM CHLORIDE 1000 ML: 0.9 INJECTION, SOLUTION INTRAVENOUS at 15:12

## 2019-05-02 RX ADMIN — LIDOCAINE HYDROCHLORIDE 4 ML: 20 INJECTION, SOLUTION EPIDURAL; INFILTRATION; INTRACAUDAL; PERINEURAL at 16:51

## 2019-05-02 RX ADMIN — LIDOCAINE HYDROCHLORIDE 105 MG: 10 INJECTION, SOLUTION EPIDURAL; INFILTRATION; INTRACAUDAL; PERINEURAL at 15:32

## 2019-05-02 RX ADMIN — HYDROMORPHONE HYDROCHLORIDE 0.5 MG: 1 INJECTION, SOLUTION INTRAMUSCULAR; INTRAVENOUS; SUBCUTANEOUS at 15:12

## 2019-05-03 ENCOUNTER — TELEPHONE (OUTPATIENT)
Dept: OBGYN CLINIC | Facility: HOSPITAL | Age: 29
End: 2019-05-03

## 2019-05-03 DIAGNOSIS — Z98.890 STATUS POST SURGERY: ICD-10-CM

## 2019-05-03 DIAGNOSIS — M25.00 HEMARTHROSIS: ICD-10-CM

## 2019-05-03 RX ORDER — HYDROCODONE BITARTRATE AND ACETAMINOPHEN 5; 325 MG/1; MG/1
1 TABLET ORAL EVERY 6 HOURS PRN
Qty: 12 TABLET | Refills: 0 | Status: ON HOLD | OUTPATIENT
Start: 2019-05-03 | End: 2019-05-17 | Stop reason: SDUPTHER

## 2019-05-03 RX ORDER — IBUPROFEN 800 MG/1
800 TABLET ORAL EVERY 8 HOURS PRN
Qty: 30 TABLET | Refills: 0 | Status: SHIPPED | OUTPATIENT
Start: 2019-05-03 | End: 2019-05-22 | Stop reason: SDUPTHER

## 2019-05-07 ENCOUNTER — APPOINTMENT (EMERGENCY)
Dept: RADIOLOGY | Facility: HOSPITAL | Age: 29
DRG: 486 | End: 2019-05-07
Payer: COMMERCIAL

## 2019-05-07 ENCOUNTER — HOSPITAL ENCOUNTER (INPATIENT)
Facility: HOSPITAL | Age: 29
LOS: 9 days | Discharge: HOME HEALTH | DRG: 486 | End: 2019-05-17
Attending: EMERGENCY MEDICINE | Admitting: INTERNAL MEDICINE
Payer: COMMERCIAL

## 2019-05-07 ENCOUNTER — TELEPHONE (OUTPATIENT)
Dept: OBGYN CLINIC | Facility: HOSPITAL | Age: 29
End: 2019-05-07

## 2019-05-07 ENCOUNTER — APPOINTMENT (EMERGENCY)
Dept: NON INVASIVE DIAGNOSTICS | Facility: HOSPITAL | Age: 29
DRG: 486 | End: 2019-05-07
Payer: COMMERCIAL

## 2019-05-07 DIAGNOSIS — M25.562 LEFT KNEE PAIN: ICD-10-CM

## 2019-05-07 DIAGNOSIS — E88.09 HYPOALBUMINEMIA DUE TO PROTEIN-CALORIE MALNUTRITION (HCC): ICD-10-CM

## 2019-05-07 DIAGNOSIS — R50.9 FEVER IN ADULT: ICD-10-CM

## 2019-05-07 DIAGNOSIS — M00.062 STAPHYLOCOCCAL ARTHRITIS OF LEFT KNEE (HCC): ICD-10-CM

## 2019-05-07 DIAGNOSIS — M00.9 PYOGENIC ARTHRITIS OF LEFT KNEE JOINT, DUE TO UNSPECIFIED ORGANISM (HCC): ICD-10-CM

## 2019-05-07 DIAGNOSIS — R33.9 URINARY RETENTION: ICD-10-CM

## 2019-05-07 DIAGNOSIS — M25.00 HEMARTHROSIS: ICD-10-CM

## 2019-05-07 DIAGNOSIS — S72.002A CLOSED FRACTURE OF NECK OF LEFT FEMUR, INITIAL ENCOUNTER (HCC): ICD-10-CM

## 2019-05-07 DIAGNOSIS — E46 HYPOALBUMINEMIA DUE TO PROTEIN-CALORIE MALNUTRITION (HCC): ICD-10-CM

## 2019-05-07 DIAGNOSIS — G89.18 POSTOPERATIVE PAIN: Primary | ICD-10-CM

## 2019-05-07 DIAGNOSIS — M25.062 HEMARTHROSIS, LEFT KNEE: ICD-10-CM

## 2019-05-07 LAB
ALBUMIN SERPL BCP-MCNC: 2.7 G/DL (ref 3.5–5)
ALP SERPL-CCNC: 211 U/L (ref 46–116)
ALT SERPL W P-5'-P-CCNC: 76 U/L (ref 12–78)
ANION GAP SERPL CALCULATED.3IONS-SCNC: 6 MMOL/L (ref 4–13)
AST SERPL W P-5'-P-CCNC: 55 U/L (ref 5–45)
BASOPHILS # BLD AUTO: 0.05 THOUSANDS/ΜL (ref 0–0.1)
BASOPHILS NFR BLD AUTO: 0 % (ref 0–1)
BILIRUB SERPL-MCNC: 0.37 MG/DL (ref 0.2–1)
BUN SERPL-MCNC: 8 MG/DL (ref 5–25)
CALCIUM SERPL-MCNC: 10.1 MG/DL (ref 8.3–10.1)
CHLORIDE SERPL-SCNC: 102 MMOL/L (ref 100–108)
CO2 SERPL-SCNC: 28 MMOL/L (ref 21–32)
CREAT SERPL-MCNC: 0.71 MG/DL (ref 0.6–1.3)
CRP SERPL QL: >90 MG/L
EOSINOPHIL # BLD AUTO: 0.17 THOUSAND/ΜL (ref 0–0.61)
EOSINOPHIL NFR BLD AUTO: 1 % (ref 0–6)
ERYTHROCYTE [DISTWIDTH] IN BLOOD BY AUTOMATED COUNT: 13.8 % (ref 11.6–15.1)
ERYTHROCYTE [SEDIMENTATION RATE] IN BLOOD: 91 MM/HOUR (ref 0–10)
GFR SERPL CREATININE-BSD FRML MDRD: 129 ML/MIN/1.73SQ M
GLUCOSE SERPL-MCNC: 106 MG/DL (ref 65–140)
HCT VFR BLD AUTO: 39.3 % (ref 36.5–49.3)
HGB BLD-MCNC: 12.5 G/DL (ref 12–17)
IMM GRANULOCYTES # BLD AUTO: 0.17 THOUSAND/UL (ref 0–0.2)
IMM GRANULOCYTES NFR BLD AUTO: 1 % (ref 0–2)
LYMPHOCYTES # BLD AUTO: 1.42 THOUSANDS/ΜL (ref 0.6–4.47)
LYMPHOCYTES NFR BLD AUTO: 9 % (ref 14–44)
MCH RBC QN AUTO: 28.7 PG (ref 26.8–34.3)
MCHC RBC AUTO-ENTMCNC: 31.8 G/DL (ref 31.4–37.4)
MCV RBC AUTO: 90 FL (ref 82–98)
MONOCYTES # BLD AUTO: 0.89 THOUSAND/ΜL (ref 0.17–1.22)
MONOCYTES NFR BLD AUTO: 6 % (ref 4–12)
NEUTROPHILS # BLD AUTO: 13.47 THOUSANDS/ΜL (ref 1.85–7.62)
NEUTS SEG NFR BLD AUTO: 83 % (ref 43–75)
NRBC BLD AUTO-RTO: 0 /100 WBCS
PLATELET # BLD AUTO: 712 THOUSANDS/UL (ref 149–390)
PMV BLD AUTO: 8.3 FL (ref 8.9–12.7)
POTASSIUM SERPL-SCNC: 3.9 MMOL/L (ref 3.5–5.3)
PROT SERPL-MCNC: 8.3 G/DL (ref 6.4–8.2)
RBC # BLD AUTO: 4.36 MILLION/UL (ref 3.88–5.62)
SODIUM SERPL-SCNC: 136 MMOL/L (ref 136–145)
WBC # BLD AUTO: 16.17 THOUSAND/UL (ref 4.31–10.16)

## 2019-05-07 PROCEDURE — 99284 EMERGENCY DEPT VISIT MOD MDM: CPT | Performed by: EMERGENCY MEDICINE

## 2019-05-07 PROCEDURE — 93971 EXTREMITY STUDY: CPT

## 2019-05-07 PROCEDURE — 85025 COMPLETE CBC W/AUTO DIFF WBC: CPT | Performed by: EMERGENCY MEDICINE

## 2019-05-07 PROCEDURE — 96376 TX/PRO/DX INJ SAME DRUG ADON: CPT

## 2019-05-07 PROCEDURE — 0S9D3ZX DRAINAGE OF LEFT KNEE JOINT, PERCUTANEOUS APPROACH, DIAGNOSTIC: ICD-10-PCS | Performed by: ORTHOPAEDIC SURGERY

## 2019-05-07 PROCEDURE — 85652 RBC SED RATE AUTOMATED: CPT | Performed by: EMERGENCY MEDICINE

## 2019-05-07 PROCEDURE — 80053 COMPREHEN METABOLIC PANEL: CPT | Performed by: EMERGENCY MEDICINE

## 2019-05-07 PROCEDURE — 86140 C-REACTIVE PROTEIN: CPT | Performed by: EMERGENCY MEDICINE

## 2019-05-07 PROCEDURE — 36415 COLL VENOUS BLD VENIPUNCTURE: CPT | Performed by: EMERGENCY MEDICINE

## 2019-05-07 PROCEDURE — 96374 THER/PROPH/DIAG INJ IV PUSH: CPT

## 2019-05-07 PROCEDURE — 99285 EMERGENCY DEPT VISIT HI MDM: CPT

## 2019-05-07 PROCEDURE — NS001 PR NO SIGNATURE OR ATTESTATION: Performed by: ORTHOPAEDIC SURGERY

## 2019-05-07 RX ORDER — METHOCARBAMOL 500 MG/1
500 TABLET, FILM COATED ORAL ONCE
Status: COMPLETED | OUTPATIENT
Start: 2019-05-07 | End: 2019-05-07

## 2019-05-07 RX ORDER — HYDROMORPHONE HCL/PF 1 MG/ML
0.5 SYRINGE (ML) INJECTION ONCE
Status: COMPLETED | OUTPATIENT
Start: 2019-05-07 | End: 2019-05-07

## 2019-05-07 RX ORDER — HYDROCODONE BITARTRATE AND ACETAMINOPHEN 5; 325 MG/1; MG/1
1 TABLET ORAL EVERY 6 HOURS PRN
Status: DISCONTINUED | OUTPATIENT
Start: 2019-05-07 | End: 2019-05-08 | Stop reason: SDUPTHER

## 2019-05-07 RX ORDER — LIDOCAINE HYDROCHLORIDE 10 MG/ML
10 INJECTION, SOLUTION EPIDURAL; INFILTRATION; INTRACAUDAL; PERINEURAL ONCE
Status: COMPLETED | OUTPATIENT
Start: 2019-05-07 | End: 2019-05-07

## 2019-05-07 RX ORDER — IBUPROFEN 400 MG/1
800 TABLET ORAL ONCE
Status: COMPLETED | OUTPATIENT
Start: 2019-05-07 | End: 2019-05-07

## 2019-05-07 RX ADMIN — LIDOCAINE HYDROCHLORIDE 10 ML: 10 INJECTION, SOLUTION EPIDURAL; INFILTRATION; INTRACAUDAL; PERINEURAL at 23:36

## 2019-05-07 RX ADMIN — METHOCARBAMOL 500 MG: 500 TABLET, FILM COATED ORAL at 22:30

## 2019-05-07 RX ADMIN — HYDROCODONE BITARTRATE AND ACETAMINOPHEN 1 TABLET: 5; 325 TABLET ORAL at 23:36

## 2019-05-07 RX ADMIN — IBUPROFEN 800 MG: 400 TABLET ORAL at 23:36

## 2019-05-07 RX ADMIN — HYDROMORPHONE HYDROCHLORIDE 0.5 MG: 1 INJECTION, SOLUTION INTRAMUSCULAR; INTRAVENOUS; SUBCUTANEOUS at 21:19

## 2019-05-07 RX ADMIN — HYDROMORPHONE HYDROCHLORIDE 0.5 MG: 1 INJECTION, SOLUTION INTRAMUSCULAR; INTRAVENOUS; SUBCUTANEOUS at 22:30

## 2019-05-08 ENCOUNTER — TELEPHONE (OUTPATIENT)
Dept: OBGYN CLINIC | Facility: CLINIC | Age: 29
End: 2019-05-08

## 2019-05-08 ENCOUNTER — APPOINTMENT (EMERGENCY)
Dept: RADIOLOGY | Facility: HOSPITAL | Age: 29
DRG: 486 | End: 2019-05-08
Payer: COMMERCIAL

## 2019-05-08 PROBLEM — R26.2 AMBULATORY DYSFUNCTION: Status: ACTIVE | Noted: 2019-05-08

## 2019-05-08 PROBLEM — R50.9 FEVER IN ADULT: Status: ACTIVE | Noted: 2019-05-08

## 2019-05-08 PROBLEM — E88.09 HYPOALBUMINEMIA DUE TO PROTEIN-CALORIE MALNUTRITION (HCC): Status: ACTIVE | Noted: 2019-05-08

## 2019-05-08 PROBLEM — E46 HYPOALBUMINEMIA DUE TO PROTEIN-CALORIE MALNUTRITION (HCC): Status: ACTIVE | Noted: 2019-05-08

## 2019-05-08 LAB
APPEARANCE FLD: ABNORMAL
COLOR FLD: ABNORMAL
CRYSTALS SNV QL MICRO: NORMAL
EOSINOPHIL NFR SNV MANUAL: 1 %
GRAM STN SPEC: NORMAL
GRAM STN SPEC: NORMAL
LYMPHOCYTES # SNV MANUAL: 20 %
MONOCYTES NFR SNV MANUAL: 6 %
NEUTROPHILS NFR SNV MANUAL: 73 %
PREALB SERPL-MCNC: 21.7 MG/DL (ref 18–40)
RBC # SNV MANUAL: ABNORMAL /UL (ref 0–10)
SITE: ABNORMAL
TOTAL CELLS COUNTED SPEC: 100
TSH SERPL DL<=0.05 MIU/L-ACNC: 1.33 UIU/ML (ref 0.36–3.74)
WBC # FLD MANUAL: ABNORMAL /UL (ref 0–200)

## 2019-05-08 PROCEDURE — 87186 SC STD MICRODIL/AGAR DIL: CPT | Performed by: ORTHOPAEDIC SURGERY

## 2019-05-08 PROCEDURE — 99254 IP/OBS CNSLTJ NEW/EST MOD 60: CPT | Performed by: NURSE PRACTITIONER

## 2019-05-08 PROCEDURE — 36415 COLL VENOUS BLD VENIPUNCTURE: CPT | Performed by: INTERNAL MEDICINE

## 2019-05-08 PROCEDURE — 73590 X-RAY EXAM OF LOWER LEG: CPT

## 2019-05-08 PROCEDURE — 87205 SMEAR GRAM STAIN: CPT | Performed by: ORTHOPAEDIC SURGERY

## 2019-05-08 PROCEDURE — 89050 BODY FLUID CELL COUNT: CPT | Performed by: ORTHOPAEDIC SURGERY

## 2019-05-08 PROCEDURE — 96376 TX/PRO/DX INJ SAME DRUG ADON: CPT

## 2019-05-08 PROCEDURE — 87147 CULTURE TYPE IMMUNOLOGIC: CPT | Performed by: ORTHOPAEDIC SURGERY

## 2019-05-08 PROCEDURE — 87476 LYME DIS DNA AMP PROBE: CPT | Performed by: ORTHOPAEDIC SURGERY

## 2019-05-08 PROCEDURE — 93971 EXTREMITY STUDY: CPT | Performed by: SURGERY

## 2019-05-08 PROCEDURE — 84134 ASSAY OF PREALBUMIN: CPT | Performed by: INTERNAL MEDICINE

## 2019-05-08 PROCEDURE — 89051 BODY FLUID CELL COUNT: CPT | Performed by: ORTHOPAEDIC SURGERY

## 2019-05-08 PROCEDURE — 99223 1ST HOSP IP/OBS HIGH 75: CPT | Performed by: INTERNAL MEDICINE

## 2019-05-08 PROCEDURE — 99254 IP/OBS CNSLTJ NEW/EST MOD 60: CPT | Performed by: INTERNAL MEDICINE

## 2019-05-08 PROCEDURE — 89060 EXAM SYNOVIAL FLUID CRYSTALS: CPT | Performed by: ORTHOPAEDIC SURGERY

## 2019-05-08 PROCEDURE — 87040 BLOOD CULTURE FOR BACTERIA: CPT | Performed by: INTERNAL MEDICINE

## 2019-05-08 PROCEDURE — 87070 CULTURE OTHR SPECIMN AEROBIC: CPT | Performed by: ORTHOPAEDIC SURGERY

## 2019-05-08 PROCEDURE — 84443 ASSAY THYROID STIM HORMONE: CPT | Performed by: INTERNAL MEDICINE

## 2019-05-08 PROCEDURE — NS001 PR NO SIGNATURE OR ATTESTATION: Performed by: ORTHOPAEDIC SURGERY

## 2019-05-08 RX ORDER — DOCUSATE SODIUM 100 MG/1
100 CAPSULE, LIQUID FILLED ORAL EVERY 12 HOURS
Status: DISCONTINUED | OUTPATIENT
Start: 2019-05-08 | End: 2019-05-17 | Stop reason: HOSPADM

## 2019-05-08 RX ORDER — HYDROMORPHONE HCL/PF 1 MG/ML
0.5 SYRINGE (ML) INJECTION
Status: DISCONTINUED | OUTPATIENT
Start: 2019-05-08 | End: 2019-05-14

## 2019-05-08 RX ORDER — METHOCARBAMOL 750 MG/1
750 TABLET, FILM COATED ORAL EVERY 6 HOURS SCHEDULED
Status: DISCONTINUED | OUTPATIENT
Start: 2019-05-08 | End: 2019-05-17 | Stop reason: HOSPADM

## 2019-05-08 RX ORDER — HYDROMORPHONE HCL/PF 1 MG/ML
0.5 SYRINGE (ML) INJECTION ONCE
Status: COMPLETED | OUTPATIENT
Start: 2019-05-08 | End: 2019-05-08

## 2019-05-08 RX ORDER — POLYETHYLENE GLYCOL 3350 17 G/17G
17 POWDER, FOR SOLUTION ORAL DAILY
Status: DISCONTINUED | OUTPATIENT
Start: 2019-05-08 | End: 2019-05-17 | Stop reason: HOSPADM

## 2019-05-08 RX ORDER — OXYCODONE HYDROCHLORIDE 10 MG/1
10 TABLET ORAL EVERY 4 HOURS PRN
Status: DISCONTINUED | OUTPATIENT
Start: 2019-05-08 | End: 2019-05-08

## 2019-05-08 RX ORDER — IBUPROFEN 400 MG/1
800 TABLET ORAL EVERY 8 HOURS PRN
Status: DISCONTINUED | OUTPATIENT
Start: 2019-05-08 | End: 2019-05-08

## 2019-05-08 RX ORDER — HYDROMORPHONE HYDROCHLORIDE 2 MG/1
1 TABLET ORAL EVERY 4 HOURS PRN
Status: COMPLETED | OUTPATIENT
Start: 2019-05-08 | End: 2019-05-09

## 2019-05-08 RX ORDER — LIDOCAINE 50 MG/G
1 PATCH TOPICAL DAILY
Status: DISCONTINUED | OUTPATIENT
Start: 2019-05-08 | End: 2019-05-17 | Stop reason: HOSPADM

## 2019-05-08 RX ORDER — GABAPENTIN 100 MG/1
100 CAPSULE ORAL 3 TIMES DAILY
Status: DISCONTINUED | OUTPATIENT
Start: 2019-05-08 | End: 2019-05-15

## 2019-05-08 RX ORDER — ONDANSETRON 2 MG/ML
4 INJECTION INTRAMUSCULAR; INTRAVENOUS EVERY 4 HOURS PRN
Status: DISCONTINUED | OUTPATIENT
Start: 2019-05-08 | End: 2019-05-17 | Stop reason: HOSPADM

## 2019-05-08 RX ORDER — LANOLIN ALCOHOL/MO/W.PET/CERES
6 CREAM (GRAM) TOPICAL
Status: DISCONTINUED | OUTPATIENT
Start: 2019-05-08 | End: 2019-05-17 | Stop reason: HOSPADM

## 2019-05-08 RX ORDER — MONTELUKAST SODIUM 10 MG/1
10 TABLET ORAL DAILY
Status: DISCONTINUED | OUTPATIENT
Start: 2019-05-08 | End: 2019-05-17 | Stop reason: HOSPADM

## 2019-05-08 RX ORDER — HYDROCODONE BITARTRATE AND ACETAMINOPHEN 5; 325 MG/1; MG/1
1 TABLET ORAL EVERY 6 HOURS PRN
Status: DISCONTINUED | OUTPATIENT
Start: 2019-05-08 | End: 2019-05-17 | Stop reason: HOSPADM

## 2019-05-08 RX ORDER — HYDROMORPHONE HCL/PF 1 MG/ML
1 SYRINGE (ML) INJECTION ONCE
Status: COMPLETED | OUTPATIENT
Start: 2019-05-08 | End: 2019-05-08

## 2019-05-08 RX ADMIN — HYDROCODONE BITARTRATE AND ACETAMINOPHEN 1 TABLET: 5; 325 TABLET ORAL at 16:14

## 2019-05-08 RX ADMIN — GABAPENTIN 100 MG: 100 CAPSULE ORAL at 09:30

## 2019-05-08 RX ADMIN — MONTELUKAST SODIUM 10 MG: 10 TABLET, FILM COATED ORAL at 09:30

## 2019-05-08 RX ADMIN — METHOCARBAMOL 750 MG: 750 TABLET, FILM COATED ORAL at 05:24

## 2019-05-08 RX ADMIN — METHOCARBAMOL 750 MG: 750 TABLET, FILM COATED ORAL at 17:19

## 2019-05-08 RX ADMIN — HYDROMORPHONE HYDROCHLORIDE 0.5 MG: 1 INJECTION, SOLUTION INTRAMUSCULAR; INTRAVENOUS; SUBCUTANEOUS at 19:33

## 2019-05-08 RX ADMIN — HYDROMORPHONE HYDROCHLORIDE 0.5 MG: 1 INJECTION, SOLUTION INTRAMUSCULAR; INTRAVENOUS; SUBCUTANEOUS at 07:22

## 2019-05-08 RX ADMIN — DOCUSATE SODIUM 100 MG: 100 CAPSULE, LIQUID FILLED ORAL at 05:23

## 2019-05-08 RX ADMIN — HYDROMORPHONE HYDROCHLORIDE 0.5 MG: 1 INJECTION, SOLUTION INTRAMUSCULAR; INTRAVENOUS; SUBCUTANEOUS at 21:22

## 2019-05-08 RX ADMIN — METHOCARBAMOL 750 MG: 750 TABLET, FILM COATED ORAL at 11:40

## 2019-05-08 RX ADMIN — ENOXAPARIN SODIUM 40 MG: 40 INJECTION SUBCUTANEOUS at 09:29

## 2019-05-08 RX ADMIN — LIDOCAINE 1 PATCH: 50 PATCH CUTANEOUS at 09:43

## 2019-05-08 RX ADMIN — HYDROMORPHONE HYDROCHLORIDE 1 MG: 1 INJECTION, SOLUTION INTRAMUSCULAR; INTRAVENOUS; SUBCUTANEOUS at 01:11

## 2019-05-08 RX ADMIN — HYDROMORPHONE HYDROCHLORIDE 0.5 MG: 1 INJECTION, SOLUTION INTRAMUSCULAR; INTRAVENOUS; SUBCUTANEOUS at 04:07

## 2019-05-08 RX ADMIN — HYDROMORPHONE HYDROCHLORIDE 1 MG: 2 TABLET ORAL at 22:51

## 2019-05-08 RX ADMIN — HYDROCODONE BITARTRATE AND ACETAMINOPHEN 1 TABLET: 5; 325 TABLET ORAL at 05:24

## 2019-05-08 RX ADMIN — HYDROMORPHONE HYDROCHLORIDE 0.5 MG: 1 INJECTION, SOLUTION INTRAMUSCULAR; INTRAVENOUS; SUBCUTANEOUS at 17:26

## 2019-05-08 RX ADMIN — HYDROCODONE BITARTRATE AND ACETAMINOPHEN 1 TABLET: 5; 325 TABLET ORAL at 09:43

## 2019-05-08 RX ADMIN — POLYETHYLENE GLYCOL 3350 17 G: 17 POWDER, FOR SOLUTION ORAL at 09:30

## 2019-05-08 RX ADMIN — GABAPENTIN 100 MG: 100 CAPSULE ORAL at 16:15

## 2019-05-08 RX ADMIN — DOCUSATE SODIUM 100 MG: 100 CAPSULE, LIQUID FILLED ORAL at 16:16

## 2019-05-08 RX ADMIN — GABAPENTIN 100 MG: 100 CAPSULE ORAL at 21:22

## 2019-05-09 PROBLEM — M00.9 INFECTION OF KNEE (HCC): Status: ACTIVE | Noted: 2019-05-09

## 2019-05-09 PROBLEM — R74.01 TRANSAMINITIS: Status: ACTIVE | Noted: 2019-05-09

## 2019-05-09 PROBLEM — R78.81 BACTEREMIA DUE TO GRAM-POSITIVE BACTERIA: Status: ACTIVE | Noted: 2019-05-09

## 2019-05-09 PROBLEM — R33.9 URINARY RETENTION: Status: RESOLVED | Noted: 2019-03-07 | Resolved: 2019-05-09

## 2019-05-09 LAB
ABO GROUP BLD: NORMAL
ALBUMIN SERPL BCP-MCNC: 2.4 G/DL (ref 3.5–5)
ALP SERPL-CCNC: 265 U/L (ref 46–116)
ALT SERPL W P-5'-P-CCNC: 113 U/L (ref 12–78)
ANION GAP SERPL CALCULATED.3IONS-SCNC: 5 MMOL/L (ref 4–13)
APTT PPP: 36 SECONDS (ref 26–38)
AST SERPL W P-5'-P-CCNC: 140 U/L (ref 5–45)
BASOPHILS # BLD AUTO: 0.05 THOUSANDS/ΜL (ref 0–0.1)
BASOPHILS NFR BLD AUTO: 0 % (ref 0–1)
BILIRUB SERPL-MCNC: 0.54 MG/DL (ref 0.2–1)
BLD GP AB SCN SERPL QL: NEGATIVE
BUN SERPL-MCNC: 6 MG/DL (ref 5–25)
CALCIUM SERPL-MCNC: 9.9 MG/DL (ref 8.3–10.1)
CHLORIDE SERPL-SCNC: 102 MMOL/L (ref 100–108)
CO2 SERPL-SCNC: 28 MMOL/L (ref 21–32)
CREAT SERPL-MCNC: 0.53 MG/DL (ref 0.6–1.3)
EOSINOPHIL # BLD AUTO: 0.14 THOUSAND/ΜL (ref 0–0.61)
EOSINOPHIL NFR BLD AUTO: 1 % (ref 0–6)
ERYTHROCYTE [DISTWIDTH] IN BLOOD BY AUTOMATED COUNT: 13.9 % (ref 11.6–15.1)
GFR SERPL CREATININE-BSD FRML MDRD: 146 ML/MIN/1.73SQ M
GLUCOSE SERPL-MCNC: 103 MG/DL (ref 65–140)
HCT VFR BLD AUTO: 37.3 % (ref 36.5–49.3)
HGB BLD-MCNC: 11.6 G/DL (ref 12–17)
IMM GRANULOCYTES # BLD AUTO: 0.1 THOUSAND/UL (ref 0–0.2)
IMM GRANULOCYTES NFR BLD AUTO: 1 % (ref 0–2)
INR PPP: 1.14 (ref 0.86–1.17)
LYMPHOCYTES # BLD AUTO: 2.41 THOUSANDS/ΜL (ref 0.6–4.47)
LYMPHOCYTES NFR BLD AUTO: 15 % (ref 14–44)
MAGNESIUM SERPL-MCNC: 1.9 MG/DL (ref 1.6–2.6)
MCH RBC QN AUTO: 28.2 PG (ref 26.8–34.3)
MCHC RBC AUTO-ENTMCNC: 31.1 G/DL (ref 31.4–37.4)
MCV RBC AUTO: 91 FL (ref 82–98)
MONOCYTES # BLD AUTO: 1.11 THOUSAND/ΜL (ref 0.17–1.22)
MONOCYTES NFR BLD AUTO: 7 % (ref 4–12)
NEUTROPHILS # BLD AUTO: 12.36 THOUSANDS/ΜL (ref 1.85–7.62)
NEUTS SEG NFR BLD AUTO: 76 % (ref 43–75)
NRBC BLD AUTO-RTO: 0 /100 WBCS
PLATELET # BLD AUTO: 704 THOUSANDS/UL (ref 149–390)
PMV BLD AUTO: 8.1 FL (ref 8.9–12.7)
POTASSIUM SERPL-SCNC: 4.1 MMOL/L (ref 3.5–5.3)
PROT SERPL-MCNC: 8.5 G/DL (ref 6.4–8.2)
PROTHROMBIN TIME: 14.7 SECONDS (ref 11.8–14.2)
RBC # BLD AUTO: 4.11 MILLION/UL (ref 3.88–5.62)
RH BLD: POSITIVE
SODIUM SERPL-SCNC: 135 MMOL/L (ref 136–145)
SPECIMEN EXPIRATION DATE: NORMAL
WBC # BLD AUTO: 16.17 THOUSAND/UL (ref 4.31–10.16)

## 2019-05-09 PROCEDURE — 99232 SBSQ HOSP IP/OBS MODERATE 35: CPT | Performed by: INTERNAL MEDICINE

## 2019-05-09 PROCEDURE — 99232 SBSQ HOSP IP/OBS MODERATE 35: CPT | Performed by: NURSE PRACTITIONER

## 2019-05-09 PROCEDURE — 85025 COMPLETE CBC W/AUTO DIFF WBC: CPT | Performed by: INTERNAL MEDICINE

## 2019-05-09 PROCEDURE — 86900 BLOOD TYPING SEROLOGIC ABO: CPT | Performed by: ORTHOPAEDIC SURGERY

## 2019-05-09 PROCEDURE — 83735 ASSAY OF MAGNESIUM: CPT | Performed by: INTERNAL MEDICINE

## 2019-05-09 PROCEDURE — 85610 PROTHROMBIN TIME: CPT | Performed by: ORTHOPAEDIC SURGERY

## 2019-05-09 PROCEDURE — 86901 BLOOD TYPING SEROLOGIC RH(D): CPT | Performed by: ORTHOPAEDIC SURGERY

## 2019-05-09 PROCEDURE — 80053 COMPREHEN METABOLIC PANEL: CPT | Performed by: INTERNAL MEDICINE

## 2019-05-09 PROCEDURE — 99233 SBSQ HOSP IP/OBS HIGH 50: CPT | Performed by: NURSE PRACTITIONER

## 2019-05-09 PROCEDURE — 86850 RBC ANTIBODY SCREEN: CPT | Performed by: ORTHOPAEDIC SURGERY

## 2019-05-09 PROCEDURE — 85730 THROMBOPLASTIN TIME PARTIAL: CPT | Performed by: ORTHOPAEDIC SURGERY

## 2019-05-09 RX ORDER — VANCOMYCIN HYDROCHLORIDE 1 G/200ML
15 INJECTION, SOLUTION INTRAVENOUS EVERY 12 HOURS
Status: DISCONTINUED | OUTPATIENT
Start: 2019-05-09 | End: 2019-05-11

## 2019-05-09 RX ORDER — SODIUM CHLORIDE, SODIUM LACTATE, POTASSIUM CHLORIDE, CALCIUM CHLORIDE 600; 310; 30; 20 MG/100ML; MG/100ML; MG/100ML; MG/100ML
75 INJECTION, SOLUTION INTRAVENOUS CONTINUOUS
Status: DISCONTINUED | OUTPATIENT
Start: 2019-05-10 | End: 2019-05-11

## 2019-05-09 RX ADMIN — POLYETHYLENE GLYCOL 3350 17 G: 17 POWDER, FOR SOLUTION ORAL at 09:15

## 2019-05-09 RX ADMIN — DOCUSATE SODIUM 100 MG: 100 CAPSULE, LIQUID FILLED ORAL at 05:27

## 2019-05-09 RX ADMIN — HYDROCODONE BITARTRATE AND ACETAMINOPHEN 1 TABLET: 5; 325 TABLET ORAL at 10:49

## 2019-05-09 RX ADMIN — MONTELUKAST SODIUM 10 MG: 10 TABLET, FILM COATED ORAL at 09:15

## 2019-05-09 RX ADMIN — METHOCARBAMOL 750 MG: 750 TABLET, FILM COATED ORAL at 05:27

## 2019-05-09 RX ADMIN — HYDROMORPHONE HYDROCHLORIDE 0.5 MG: 1 INJECTION, SOLUTION INTRAMUSCULAR; INTRAVENOUS; SUBCUTANEOUS at 11:55

## 2019-05-09 RX ADMIN — HYDROMORPHONE HYDROCHLORIDE 0.5 MG: 1 INJECTION, SOLUTION INTRAMUSCULAR; INTRAVENOUS; SUBCUTANEOUS at 03:37

## 2019-05-09 RX ADMIN — VANCOMYCIN HYDROCHLORIDE 1000 MG: 1 INJECTION, SOLUTION INTRAVENOUS at 15:22

## 2019-05-09 RX ADMIN — GABAPENTIN 100 MG: 100 CAPSULE ORAL at 15:22

## 2019-05-09 RX ADMIN — METHOCARBAMOL 750 MG: 750 TABLET, FILM COATED ORAL at 23:02

## 2019-05-09 RX ADMIN — METHOCARBAMOL 750 MG: 750 TABLET, FILM COATED ORAL at 17:38

## 2019-05-09 RX ADMIN — HYDROMORPHONE HYDROCHLORIDE 0.5 MG: 1 INJECTION, SOLUTION INTRAMUSCULAR; INTRAVENOUS; SUBCUTANEOUS at 18:33

## 2019-05-09 RX ADMIN — GABAPENTIN 100 MG: 100 CAPSULE ORAL at 09:15

## 2019-05-09 RX ADMIN — HYDROMORPHONE HYDROCHLORIDE 0.5 MG: 1 INJECTION, SOLUTION INTRAMUSCULAR; INTRAVENOUS; SUBCUTANEOUS at 20:46

## 2019-05-09 RX ADMIN — ENOXAPARIN SODIUM 40 MG: 40 INJECTION SUBCUTANEOUS at 09:15

## 2019-05-09 RX ADMIN — LIDOCAINE 1 PATCH: 50 PATCH CUTANEOUS at 09:14

## 2019-05-09 RX ADMIN — MELATONIN 6 MG: at 21:05

## 2019-05-09 RX ADMIN — HYDROMORPHONE HYDROCHLORIDE 1 MG: 2 TABLET ORAL at 07:27

## 2019-05-09 RX ADMIN — METHOCARBAMOL 750 MG: 750 TABLET, FILM COATED ORAL at 11:43

## 2019-05-09 RX ADMIN — HYDROCODONE BITARTRATE AND ACETAMINOPHEN 1 TABLET: 5; 325 TABLET ORAL at 17:39

## 2019-05-09 RX ADMIN — HYDROMORPHONE HYDROCHLORIDE 1 MG: 2 TABLET ORAL at 02:54

## 2019-05-09 RX ADMIN — METHOCARBAMOL 750 MG: 750 TABLET, FILM COATED ORAL at 00:31

## 2019-05-09 RX ADMIN — HYDROMORPHONE HYDROCHLORIDE 0.5 MG: 1 INJECTION, SOLUTION INTRAMUSCULAR; INTRAVENOUS; SUBCUTANEOUS at 01:34

## 2019-05-09 RX ADMIN — GABAPENTIN 100 MG: 100 CAPSULE ORAL at 20:46

## 2019-05-09 RX ADMIN — DOCUSATE SODIUM 100 MG: 100 CAPSULE, LIQUID FILLED ORAL at 17:39

## 2019-05-09 RX ADMIN — HYDROMORPHONE HYDROCHLORIDE 0.5 MG: 1 INJECTION, SOLUTION INTRAMUSCULAR; INTRAVENOUS; SUBCUTANEOUS at 15:21

## 2019-05-09 RX ADMIN — HYDROMORPHONE HYDROCHLORIDE 0.5 MG: 1 INJECTION, SOLUTION INTRAMUSCULAR; INTRAVENOUS; SUBCUTANEOUS at 23:02

## 2019-05-09 RX ADMIN — SODIUM CHLORIDE, SODIUM LACTATE, POTASSIUM CHLORIDE, AND CALCIUM CHLORIDE 75 ML/HR: .6; .31; .03; .02 INJECTION, SOLUTION INTRAVENOUS at 23:07

## 2019-05-09 RX ADMIN — HYDROMORPHONE HYDROCHLORIDE 0.5 MG: 1 INJECTION, SOLUTION INTRAMUSCULAR; INTRAVENOUS; SUBCUTANEOUS at 09:36

## 2019-05-09 RX ADMIN — HYDROMORPHONE HYDROCHLORIDE 0.5 MG: 1 INJECTION, SOLUTION INTRAMUSCULAR; INTRAVENOUS; SUBCUTANEOUS at 05:27

## 2019-05-10 ENCOUNTER — ANESTHESIA (INPATIENT)
Dept: PERIOP | Facility: HOSPITAL | Age: 29
DRG: 486 | End: 2019-05-10
Payer: COMMERCIAL

## 2019-05-10 ENCOUNTER — ANESTHESIA EVENT (INPATIENT)
Dept: PERIOP | Facility: HOSPITAL | Age: 29
DRG: 486 | End: 2019-05-10
Payer: COMMERCIAL

## 2019-05-10 ENCOUNTER — APPOINTMENT (INPATIENT)
Dept: RADIOLOGY | Facility: HOSPITAL | Age: 29
DRG: 486 | End: 2019-05-10
Payer: COMMERCIAL

## 2019-05-10 PROBLEM — M00.9 PYOGENIC ARTHRITIS OF LEFT KNEE JOINT (HCC): Status: ACTIVE | Noted: 2019-05-07

## 2019-05-10 LAB
ALBUMIN SERPL BCP-MCNC: 2.3 G/DL (ref 3.5–5)
ALP SERPL-CCNC: 318 U/L (ref 46–116)
ALT SERPL W P-5'-P-CCNC: 140 U/L (ref 12–78)
ANION GAP SERPL CALCULATED.3IONS-SCNC: 6 MMOL/L (ref 4–13)
AST SERPL W P-5'-P-CCNC: 114 U/L (ref 5–45)
BACTERIA SPEC BFLD CULT: ABNORMAL
BACTERIA SPEC BFLD CULT: ABNORMAL
BASOPHILS # BLD AUTO: 0.05 THOUSANDS/ΜL (ref 0–0.1)
BASOPHILS NFR BLD AUTO: 0 % (ref 0–1)
BILIRUB SERPL-MCNC: 0.65 MG/DL (ref 0.2–1)
BUN SERPL-MCNC: 7 MG/DL (ref 5–25)
CALCIUM SERPL-MCNC: 10.2 MG/DL (ref 8.3–10.1)
CHLORIDE SERPL-SCNC: 101 MMOL/L (ref 100–108)
CO2 SERPL-SCNC: 29 MMOL/L (ref 21–32)
CREAT SERPL-MCNC: 0.64 MG/DL (ref 0.6–1.3)
EOSINOPHIL # BLD AUTO: 0.16 THOUSAND/ΜL (ref 0–0.61)
EOSINOPHIL NFR BLD AUTO: 1 % (ref 0–6)
ERYTHROCYTE [DISTWIDTH] IN BLOOD BY AUTOMATED COUNT: 13.7 % (ref 11.6–15.1)
GFR SERPL CREATININE-BSD FRML MDRD: 135 ML/MIN/1.73SQ M
GLUCOSE SERPL-MCNC: 107 MG/DL (ref 65–140)
GRAM STN SPEC: ABNORMAL
GRAM STN SPEC: ABNORMAL
HCT VFR BLD AUTO: 37.1 % (ref 36.5–49.3)
HGB BLD-MCNC: 11.6 G/DL (ref 12–17)
IMM GRANULOCYTES # BLD AUTO: 0.1 THOUSAND/UL (ref 0–0.2)
IMM GRANULOCYTES NFR BLD AUTO: 1 % (ref 0–2)
LYMPHOCYTES # BLD AUTO: 1.66 THOUSANDS/ΜL (ref 0.6–4.47)
LYMPHOCYTES NFR BLD AUTO: 12 % (ref 14–44)
MCH RBC QN AUTO: 28.2 PG (ref 26.8–34.3)
MCHC RBC AUTO-ENTMCNC: 31.3 G/DL (ref 31.4–37.4)
MCV RBC AUTO: 90 FL (ref 82–98)
MONOCYTES # BLD AUTO: 1.18 THOUSAND/ΜL (ref 0.17–1.22)
MONOCYTES NFR BLD AUTO: 8 % (ref 4–12)
NEUTROPHILS # BLD AUTO: 11.07 THOUSANDS/ΜL (ref 1.85–7.62)
NEUTS SEG NFR BLD AUTO: 78 % (ref 43–75)
NRBC BLD AUTO-RTO: 0 /100 WBCS
PLATELET # BLD AUTO: 696 THOUSANDS/UL (ref 149–390)
PMV BLD AUTO: 8.6 FL (ref 8.9–12.7)
POTASSIUM SERPL-SCNC: 4.2 MMOL/L (ref 3.5–5.3)
PROT SERPL-MCNC: 8.6 G/DL (ref 6.4–8.2)
RBC # BLD AUTO: 4.11 MILLION/UL (ref 3.88–5.62)
SODIUM SERPL-SCNC: 136 MMOL/L (ref 136–145)
WBC # BLD AUTO: 14.22 THOUSAND/UL (ref 4.31–10.16)

## 2019-05-10 PROCEDURE — 73560 X-RAY EXAM OF KNEE 1 OR 2: CPT

## 2019-05-10 PROCEDURE — 87206 SMEAR FLUORESCENT/ACID STAI: CPT | Performed by: ORTHOPAEDIC SURGERY

## 2019-05-10 PROCEDURE — 87147 CULTURE TYPE IMMUNOLOGIC: CPT | Performed by: ORTHOPAEDIC SURGERY

## 2019-05-10 PROCEDURE — 87186 SC STD MICRODIL/AGAR DIL: CPT | Performed by: ORTHOPAEDIC SURGERY

## 2019-05-10 PROCEDURE — 27310 EXPLORATION OF KNEE JOINT: CPT | Performed by: ORTHOPAEDIC SURGERY

## 2019-05-10 PROCEDURE — 87176 TISSUE HOMOGENIZATION CULTR: CPT | Performed by: ORTHOPAEDIC SURGERY

## 2019-05-10 PROCEDURE — 73552 X-RAY EXAM OF FEMUR 2/>: CPT

## 2019-05-10 PROCEDURE — 99232 SBSQ HOSP IP/OBS MODERATE 35: CPT | Performed by: NURSE PRACTITIONER

## 2019-05-10 PROCEDURE — 87205 SMEAR GRAM STAIN: CPT | Performed by: ORTHOPAEDIC SURGERY

## 2019-05-10 PROCEDURE — 87102 FUNGUS ISOLATION CULTURE: CPT | Performed by: ORTHOPAEDIC SURGERY

## 2019-05-10 PROCEDURE — 99024 POSTOP FOLLOW-UP VISIT: CPT | Performed by: ORTHOPAEDIC SURGERY

## 2019-05-10 PROCEDURE — 87116 MYCOBACTERIA CULTURE: CPT | Performed by: ORTHOPAEDIC SURGERY

## 2019-05-10 PROCEDURE — 99233 SBSQ HOSP IP/OBS HIGH 50: CPT | Performed by: INTERNAL MEDICINE

## 2019-05-10 PROCEDURE — 87070 CULTURE OTHR SPECIMN AEROBIC: CPT | Performed by: ORTHOPAEDIC SURGERY

## 2019-05-10 PROCEDURE — 3E0T3BZ INTRODUCTION OF ANESTHETIC AGENT INTO PERIPHERAL NERVES AND PLEXI, PERCUTANEOUS APPROACH: ICD-10-PCS | Performed by: ANESTHESIOLOGY

## 2019-05-10 PROCEDURE — 0S9D00Z DRAINAGE OF LEFT KNEE JOINT WITH DRAINAGE DEVICE, OPEN APPROACH: ICD-10-PCS | Performed by: ORTHOPAEDIC SURGERY

## 2019-05-10 PROCEDURE — 72170 X-RAY EXAM OF PELVIS: CPT

## 2019-05-10 PROCEDURE — 87075 CULTR BACTERIA EXCEPT BLOOD: CPT | Performed by: ORTHOPAEDIC SURGERY

## 2019-05-10 PROCEDURE — 0SBD0ZZ EXCISION OF LEFT KNEE JOINT, OPEN APPROACH: ICD-10-PCS | Performed by: ORTHOPAEDIC SURGERY

## 2019-05-10 PROCEDURE — 80053 COMPREHEN METABOLIC PANEL: CPT | Performed by: NURSE PRACTITIONER

## 2019-05-10 PROCEDURE — 85025 COMPLETE CBC W/AUTO DIFF WBC: CPT | Performed by: ORTHOPAEDIC SURGERY

## 2019-05-10 RX ORDER — FENTANYL CITRATE 50 UG/ML
INJECTION, SOLUTION INTRAMUSCULAR; INTRAVENOUS AS NEEDED
Status: DISCONTINUED | OUTPATIENT
Start: 2019-05-10 | End: 2019-05-10 | Stop reason: SURG

## 2019-05-10 RX ORDER — PROMETHAZINE HYDROCHLORIDE 25 MG/ML
12.5 INJECTION, SOLUTION INTRAMUSCULAR; INTRAVENOUS ONCE AS NEEDED
Status: DISCONTINUED | OUTPATIENT
Start: 2019-05-10 | End: 2019-05-10

## 2019-05-10 RX ORDER — SODIUM CHLORIDE, SODIUM LACTATE, POTASSIUM CHLORIDE, CALCIUM CHLORIDE 600; 310; 30; 20 MG/100ML; MG/100ML; MG/100ML; MG/100ML
INJECTION, SOLUTION INTRAVENOUS CONTINUOUS PRN
Status: DISCONTINUED | OUTPATIENT
Start: 2019-05-10 | End: 2019-05-10 | Stop reason: SURG

## 2019-05-10 RX ORDER — SODIUM CHLORIDE, SODIUM LACTATE, POTASSIUM CHLORIDE, CALCIUM CHLORIDE 600; 310; 30; 20 MG/100ML; MG/100ML; MG/100ML; MG/100ML
INJECTION, SOLUTION INTRAVENOUS CONTINUOUS PRN
Status: DISCONTINUED | OUTPATIENT
Start: 2019-05-10 | End: 2019-05-10

## 2019-05-10 RX ORDER — FENTANYL CITRATE 50 UG/ML
INJECTION, SOLUTION INTRAMUSCULAR; INTRAVENOUS
Status: DISPENSED
Start: 2019-05-10 | End: 2019-05-10

## 2019-05-10 RX ORDER — MAGNESIUM HYDROXIDE 1200 MG/15ML
LIQUID ORAL AS NEEDED
Status: DISCONTINUED | OUTPATIENT
Start: 2019-05-10 | End: 2019-05-10 | Stop reason: HOSPADM

## 2019-05-10 RX ORDER — LABETALOL 20 MG/4 ML (5 MG/ML) INTRAVENOUS SYRINGE
5
Status: DISCONTINUED | OUTPATIENT
Start: 2019-05-10 | End: 2019-05-10

## 2019-05-10 RX ORDER — MIDAZOLAM HYDROCHLORIDE 1 MG/ML
INJECTION INTRAMUSCULAR; INTRAVENOUS
Status: DISPENSED
Start: 2019-05-10 | End: 2019-05-10

## 2019-05-10 RX ORDER — FENTANYL CITRATE/PF 50 MCG/ML
25 SYRINGE (ML) INJECTION
Status: COMPLETED | OUTPATIENT
Start: 2019-05-10 | End: 2019-05-10

## 2019-05-10 RX ORDER — MEPERIDINE HYDROCHLORIDE 25 MG/ML
12.5 INJECTION INTRAMUSCULAR; INTRAVENOUS; SUBCUTANEOUS AS NEEDED
Status: DISCONTINUED | OUTPATIENT
Start: 2019-05-10 | End: 2019-05-10

## 2019-05-10 RX ORDER — ALBUTEROL SULFATE 2.5 MG/3ML
2.5 SOLUTION RESPIRATORY (INHALATION) ONCE AS NEEDED
Status: DISCONTINUED | OUTPATIENT
Start: 2019-05-10 | End: 2019-05-10

## 2019-05-10 RX ORDER — ONDANSETRON 2 MG/ML
INJECTION INTRAMUSCULAR; INTRAVENOUS AS NEEDED
Status: DISCONTINUED | OUTPATIENT
Start: 2019-05-10 | End: 2019-05-10 | Stop reason: SURG

## 2019-05-10 RX ORDER — CEFAZOLIN SODIUM 2 G/50ML
2000 SOLUTION INTRAVENOUS
Status: COMPLETED | OUTPATIENT
Start: 2019-05-10 | End: 2019-05-10

## 2019-05-10 RX ORDER — OXYCODONE HYDROCHLORIDE 5 MG/1
TABLET ORAL
Qty: 30 TABLET | Refills: 0 | Status: SHIPPED | OUTPATIENT
Start: 2019-05-10 | End: 2019-05-17 | Stop reason: HOSPADM

## 2019-05-10 RX ORDER — KETAMINE HYDROCHLORIDE 50 MG/ML
INJECTION, SOLUTION, CONCENTRATE INTRAMUSCULAR; INTRAVENOUS AS NEEDED
Status: DISCONTINUED | OUTPATIENT
Start: 2019-05-10 | End: 2019-05-10 | Stop reason: SURG

## 2019-05-10 RX ORDER — ONDANSETRON 2 MG/ML
4 INJECTION INTRAMUSCULAR; INTRAVENOUS ONCE AS NEEDED
Status: DISCONTINUED | OUTPATIENT
Start: 2019-05-10 | End: 2019-05-10

## 2019-05-10 RX ORDER — MIDAZOLAM HYDROCHLORIDE 1 MG/ML
INJECTION INTRAMUSCULAR; INTRAVENOUS AS NEEDED
Status: DISCONTINUED | OUTPATIENT
Start: 2019-05-10 | End: 2019-05-10 | Stop reason: SURG

## 2019-05-10 RX ORDER — DEXMEDETOMIDINE HYDROCHLORIDE 100 UG/ML
INJECTION, SOLUTION INTRAVENOUS AS NEEDED
Status: DISCONTINUED | OUTPATIENT
Start: 2019-05-10 | End: 2019-05-10 | Stop reason: SURG

## 2019-05-10 RX ORDER — ASPIRIN 325 MG
325 TABLET ORAL DAILY
Qty: 56 TABLET | Refills: 0 | Status: SHIPPED | OUTPATIENT
Start: 2019-05-10 | End: 2019-06-20 | Stop reason: HOSPADM

## 2019-05-10 RX ORDER — SODIUM CHLORIDE, SODIUM LACTATE, POTASSIUM CHLORIDE, CALCIUM CHLORIDE 600; 310; 30; 20 MG/100ML; MG/100ML; MG/100ML; MG/100ML
125 INJECTION, SOLUTION INTRAVENOUS CONTINUOUS
Status: DISCONTINUED | OUTPATIENT
Start: 2019-05-10 | End: 2019-05-11

## 2019-05-10 RX ORDER — ROPIVACAINE HYDROCHLORIDE 5 MG/ML
INJECTION, SOLUTION EPIDURAL; INFILTRATION; PERINEURAL
Status: COMPLETED | OUTPATIENT
Start: 2019-05-10 | End: 2019-05-10

## 2019-05-10 RX ORDER — DEXAMETHASONE SODIUM PHOSPHATE 10 MG/ML
INJECTION, SOLUTION INTRAMUSCULAR; INTRAVENOUS AS NEEDED
Status: DISCONTINUED | OUTPATIENT
Start: 2019-05-10 | End: 2019-05-10 | Stop reason: SURG

## 2019-05-10 RX ORDER — PROPOFOL 10 MG/ML
INJECTION, EMULSION INTRAVENOUS AS NEEDED
Status: DISCONTINUED | OUTPATIENT
Start: 2019-05-10 | End: 2019-05-10 | Stop reason: SURG

## 2019-05-10 RX ORDER — HYDROMORPHONE HCL/PF 1 MG/ML
0.5 SYRINGE (ML) INJECTION
Status: DISCONTINUED | OUTPATIENT
Start: 2019-05-10 | End: 2019-05-10

## 2019-05-10 RX ADMIN — HYDROMORPHONE HYDROCHLORIDE 0.5 MG: 1 INJECTION, SOLUTION INTRAMUSCULAR; INTRAVENOUS; SUBCUTANEOUS at 05:12

## 2019-05-10 RX ADMIN — HYDROMORPHONE HYDROCHLORIDE 0.5 MG: 1 INJECTION, SOLUTION INTRAMUSCULAR; INTRAVENOUS; SUBCUTANEOUS at 11:31

## 2019-05-10 RX ADMIN — HYDROMORPHONE HYDROCHLORIDE 0.5 MG: 1 INJECTION, SOLUTION INTRAMUSCULAR; INTRAVENOUS; SUBCUTANEOUS at 03:40

## 2019-05-10 RX ADMIN — GABAPENTIN 100 MG: 100 CAPSULE ORAL at 21:26

## 2019-05-10 RX ADMIN — DEXMEDETOMIDINE HYDROCHLORIDE 20 MCG: 100 INJECTION, SOLUTION INTRAVENOUS at 10:48

## 2019-05-10 RX ADMIN — SODIUM CHLORIDE, SODIUM LACTATE, POTASSIUM CHLORIDE, AND CALCIUM CHLORIDE 125 ML/HR: .6; .31; .03; .02 INJECTION, SOLUTION INTRAVENOUS at 12:56

## 2019-05-10 RX ADMIN — FENTANYL CITRATE 50 MCG: 50 INJECTION, SOLUTION INTRAMUSCULAR; INTRAVENOUS at 09:45

## 2019-05-10 RX ADMIN — METHOCARBAMOL 750 MG: 750 TABLET, FILM COATED ORAL at 05:12

## 2019-05-10 RX ADMIN — SODIUM CHLORIDE, SODIUM LACTATE, POTASSIUM CHLORIDE, AND CALCIUM CHLORIDE: .6; .31; .03; .02 INJECTION, SOLUTION INTRAVENOUS at 10:09

## 2019-05-10 RX ADMIN — MIDAZOLAM 2 MG: 1 INJECTION INTRAMUSCULAR; INTRAVENOUS at 09:27

## 2019-05-10 RX ADMIN — FENTANYL CITRATE 25 MCG: 50 INJECTION, SOLUTION INTRAMUSCULAR; INTRAVENOUS at 11:08

## 2019-05-10 RX ADMIN — DOCUSATE SODIUM 100 MG: 100 CAPSULE, LIQUID FILLED ORAL at 05:12

## 2019-05-10 RX ADMIN — VANCOMYCIN HYDROCHLORIDE 1000 MG: 1 INJECTION, SOLUTION INTRAVENOUS at 17:51

## 2019-05-10 RX ADMIN — PROPOFOL 200 MG: 10 INJECTION, EMULSION INTRAVENOUS at 09:32

## 2019-05-10 RX ADMIN — ROPIVACAINE HYDROCHLORIDE 10 ML/HR: 10 INJECTION, SOLUTION EPIDURAL at 11:24

## 2019-05-10 RX ADMIN — METHOCARBAMOL 750 MG: 750 TABLET, FILM COATED ORAL at 23:59

## 2019-05-10 RX ADMIN — DOCUSATE SODIUM 100 MG: 100 CAPSULE, LIQUID FILLED ORAL at 17:51

## 2019-05-10 RX ADMIN — HYDROMORPHONE HYDROCHLORIDE 0.5 MG: 1 INJECTION, SOLUTION INTRAMUSCULAR; INTRAVENOUS; SUBCUTANEOUS at 13:46

## 2019-05-10 RX ADMIN — GABAPENTIN 100 MG: 100 CAPSULE ORAL at 17:51

## 2019-05-10 RX ADMIN — CEFAZOLIN SODIUM 2000 MG: 2 SOLUTION INTRAVENOUS at 09:38

## 2019-05-10 RX ADMIN — ONDANSETRON 4 MG: 2 INJECTION INTRAMUSCULAR; INTRAVENOUS at 10:53

## 2019-05-10 RX ADMIN — HYDROMORPHONE HYDROCHLORIDE 0.5 MG: 1 INJECTION, SOLUTION INTRAMUSCULAR; INTRAVENOUS; SUBCUTANEOUS at 11:44

## 2019-05-10 RX ADMIN — FENTANYL CITRATE 50 MCG: 50 INJECTION, SOLUTION INTRAMUSCULAR; INTRAVENOUS at 09:32

## 2019-05-10 RX ADMIN — HYDROMORPHONE HYDROCHLORIDE 0.5 MG: 1 INJECTION, SOLUTION INTRAMUSCULAR; INTRAVENOUS; SUBCUTANEOUS at 21:26

## 2019-05-10 RX ADMIN — KETAMINE HYDROCHLORIDE 25 MG: 50 INJECTION, SOLUTION INTRAMUSCULAR; INTRAVENOUS at 10:15

## 2019-05-10 RX ADMIN — FENTANYL CITRATE 25 MCG: 50 INJECTION, SOLUTION INTRAMUSCULAR; INTRAVENOUS at 11:29

## 2019-05-10 RX ADMIN — ROPIVACAINE HYDROCHLORIDE 30 ML: 5 INJECTION, SOLUTION EPIDURAL; INFILTRATION; PERINEURAL at 09:48

## 2019-05-10 RX ADMIN — METHOCARBAMOL 750 MG: 750 TABLET, FILM COATED ORAL at 17:51

## 2019-05-10 RX ADMIN — FENTANYL CITRATE 25 MCG: 50 INJECTION, SOLUTION INTRAMUSCULAR; INTRAVENOUS at 11:17

## 2019-05-10 RX ADMIN — HYDROMORPHONE HYDROCHLORIDE 0.5 MG: 1 INJECTION, SOLUTION INTRAMUSCULAR; INTRAVENOUS; SUBCUTANEOUS at 23:56

## 2019-05-10 RX ADMIN — VANCOMYCIN HYDROCHLORIDE 1000 MG: 1 INJECTION, SOLUTION INTRAVENOUS at 02:04

## 2019-05-10 RX ADMIN — HYDROMORPHONE HYDROCHLORIDE 0.5 MG: 1 INJECTION, SOLUTION INTRAMUSCULAR; INTRAVENOUS; SUBCUTANEOUS at 15:37

## 2019-05-10 RX ADMIN — FENTANYL CITRATE 25 MCG: 50 INJECTION, SOLUTION INTRAMUSCULAR; INTRAVENOUS at 11:23

## 2019-05-10 RX ADMIN — KETAMINE HYDROCHLORIDE 25 MG: 50 INJECTION, SOLUTION INTRAMUSCULAR; INTRAVENOUS at 09:51

## 2019-05-10 RX ADMIN — HYDROMORPHONE HYDROCHLORIDE 0.5 MG: 1 INJECTION, SOLUTION INTRAMUSCULAR; INTRAVENOUS; SUBCUTANEOUS at 07:35

## 2019-05-10 RX ADMIN — MELATONIN 6 MG: at 23:57

## 2019-05-10 RX ADMIN — DEXAMETHASONE SODIUM PHOSPHATE 5 MG: 10 INJECTION, SOLUTION INTRAMUSCULAR; INTRAVENOUS at 10:53

## 2019-05-10 RX ADMIN — SODIUM CHLORIDE, SODIUM LACTATE, POTASSIUM CHLORIDE, AND CALCIUM CHLORIDE 75 ML/HR: .6; .31; .03; .02 INJECTION, SOLUTION INTRAVENOUS at 12:09

## 2019-05-10 RX ADMIN — HYDROMORPHONE HYDROCHLORIDE 0.5 MG: 1 INJECTION, SOLUTION INTRAMUSCULAR; INTRAVENOUS; SUBCUTANEOUS at 18:05

## 2019-05-10 RX ADMIN — FENTANYL CITRATE 50 MCG: 50 INJECTION, SOLUTION INTRAMUSCULAR; INTRAVENOUS at 09:58

## 2019-05-10 RX ADMIN — SODIUM CHLORIDE, SODIUM LACTATE, POTASSIUM CHLORIDE, AND CALCIUM CHLORIDE: .6; .31; .03; .02 INJECTION, SOLUTION INTRAVENOUS at 08:25

## 2019-05-10 RX ADMIN — HYDROCODONE BITARTRATE AND ACETAMINOPHEN 1 TABLET: 5; 325 TABLET ORAL at 02:55

## 2019-05-10 RX ADMIN — DEXMEDETOMIDINE HYDROCHLORIDE 20 MCG: 100 INJECTION, SOLUTION INTRAVENOUS at 10:41

## 2019-05-10 RX ADMIN — DEXMEDETOMIDINE HYDROCHLORIDE 20 MCG: 100 INJECTION, SOLUTION INTRAVENOUS at 10:57

## 2019-05-11 PROBLEM — K59.00 CONSTIPATION: Status: ACTIVE | Noted: 2019-05-11

## 2019-05-11 LAB
ALBUMIN SERPL BCP-MCNC: 2.2 G/DL (ref 3.5–5)
ALP SERPL-CCNC: 272 U/L (ref 46–116)
ALT SERPL W P-5'-P-CCNC: 149 U/L (ref 12–78)
ANION GAP SERPL CALCULATED.3IONS-SCNC: 5 MMOL/L (ref 4–13)
AST SERPL W P-5'-P-CCNC: 109 U/L (ref 5–45)
BACTERIA BLD CULT: ABNORMAL
BASOPHILS # BLD AUTO: 0.04 THOUSANDS/ΜL (ref 0–0.1)
BASOPHILS NFR BLD AUTO: 0 % (ref 0–1)
BILIRUB SERPL-MCNC: 0.79 MG/DL (ref 0.2–1)
BUN SERPL-MCNC: 6 MG/DL (ref 5–25)
CALCIUM SERPL-MCNC: 9.4 MG/DL (ref 8.3–10.1)
CHLORIDE SERPL-SCNC: 101 MMOL/L (ref 100–108)
CO2 SERPL-SCNC: 28 MMOL/L (ref 21–32)
CREAT SERPL-MCNC: 0.56 MG/DL (ref 0.6–1.3)
EOSINOPHIL # BLD AUTO: 0.02 THOUSAND/ΜL (ref 0–0.61)
EOSINOPHIL NFR BLD AUTO: 0 % (ref 0–6)
ERYTHROCYTE [DISTWIDTH] IN BLOOD BY AUTOMATED COUNT: 13.4 % (ref 11.6–15.1)
GFR SERPL CREATININE-BSD FRML MDRD: 143 ML/MIN/1.73SQ M
GLUCOSE SERPL-MCNC: 105 MG/DL (ref 65–140)
GRAM STN SPEC: ABNORMAL
HCT VFR BLD AUTO: 31.9 % (ref 36.5–49.3)
HGB BLD-MCNC: 10.2 G/DL (ref 12–17)
IMM GRANULOCYTES # BLD AUTO: 0.11 THOUSAND/UL (ref 0–0.2)
IMM GRANULOCYTES NFR BLD AUTO: 1 % (ref 0–2)
LYMPHOCYTES # BLD AUTO: 2.37 THOUSANDS/ΜL (ref 0.6–4.47)
LYMPHOCYTES NFR BLD AUTO: 13 % (ref 14–44)
MCH RBC QN AUTO: 28.5 PG (ref 26.8–34.3)
MCHC RBC AUTO-ENTMCNC: 32 G/DL (ref 31.4–37.4)
MCV RBC AUTO: 89 FL (ref 82–98)
MONOCYTES # BLD AUTO: 1.72 THOUSAND/ΜL (ref 0.17–1.22)
MONOCYTES NFR BLD AUTO: 10 % (ref 4–12)
NEUTROPHILS # BLD AUTO: 13.65 THOUSANDS/ΜL (ref 1.85–7.62)
NEUTS SEG NFR BLD AUTO: 76 % (ref 43–75)
NRBC BLD AUTO-RTO: 0 /100 WBCS
PLATELET # BLD AUTO: 666 THOUSANDS/UL (ref 149–390)
PMV BLD AUTO: 8.4 FL (ref 8.9–12.7)
POTASSIUM SERPL-SCNC: 3.8 MMOL/L (ref 3.5–5.3)
PROT SERPL-MCNC: 7.2 G/DL (ref 6.4–8.2)
RBC # BLD AUTO: 3.58 MILLION/UL (ref 3.88–5.62)
SODIUM SERPL-SCNC: 134 MMOL/L (ref 136–145)
VANCOMYCIN TROUGH SERPL-MCNC: 5.1 UG/ML (ref 10–20)
WBC # BLD AUTO: 17.91 THOUSAND/UL (ref 4.31–10.16)

## 2019-05-11 PROCEDURE — 86803 HEPATITIS C AB TEST: CPT | Performed by: INTERNAL MEDICINE

## 2019-05-11 PROCEDURE — 87340 HEPATITIS B SURFACE AG IA: CPT | Performed by: INTERNAL MEDICINE

## 2019-05-11 PROCEDURE — 80053 COMPREHEN METABOLIC PANEL: CPT | Performed by: NURSE PRACTITIONER

## 2019-05-11 PROCEDURE — 99232 SBSQ HOSP IP/OBS MODERATE 35: CPT | Performed by: NURSE PRACTITIONER

## 2019-05-11 PROCEDURE — NS001 PR NO SIGNATURE OR ATTESTATION: Performed by: ORTHOPAEDIC SURGERY

## 2019-05-11 PROCEDURE — 85025 COMPLETE CBC W/AUTO DIFF WBC: CPT | Performed by: NURSE PRACTITIONER

## 2019-05-11 PROCEDURE — 86705 HEP B CORE ANTIBODY IGM: CPT | Performed by: INTERNAL MEDICINE

## 2019-05-11 PROCEDURE — 86704 HEP B CORE ANTIBODY TOTAL: CPT | Performed by: INTERNAL MEDICINE

## 2019-05-11 PROCEDURE — 80202 ASSAY OF VANCOMYCIN: CPT | Performed by: NURSE PRACTITIONER

## 2019-05-11 PROCEDURE — 99232 SBSQ HOSP IP/OBS MODERATE 35: CPT | Performed by: INTERNAL MEDICINE

## 2019-05-11 RX ORDER — HYDROCODONE BITARTRATE AND ACETAMINOPHEN 5; 325 MG/1; MG/1
2 TABLET ORAL EVERY 6 HOURS PRN
Status: DISCONTINUED | OUTPATIENT
Start: 2019-05-11 | End: 2019-05-17 | Stop reason: HOSPADM

## 2019-05-11 RX ORDER — VANCOMYCIN HYDROCHLORIDE 500 MG/100ML
500 INJECTION, SOLUTION INTRAVENOUS ONCE
Status: COMPLETED | OUTPATIENT
Start: 2019-05-11 | End: 2019-05-11

## 2019-05-11 RX ADMIN — GABAPENTIN 100 MG: 100 CAPSULE ORAL at 09:03

## 2019-05-11 RX ADMIN — METHOCARBAMOL 750 MG: 750 TABLET, FILM COATED ORAL at 17:27

## 2019-05-11 RX ADMIN — GABAPENTIN 100 MG: 100 CAPSULE ORAL at 17:27

## 2019-05-11 RX ADMIN — HYDROMORPHONE HYDROCHLORIDE 0.5 MG: 1 INJECTION, SOLUTION INTRAMUSCULAR; INTRAVENOUS; SUBCUTANEOUS at 13:37

## 2019-05-11 RX ADMIN — METHOCARBAMOL 750 MG: 750 TABLET, FILM COATED ORAL at 11:33

## 2019-05-11 RX ADMIN — HYDROCODONE BITARTRATE AND ACETAMINOPHEN 1 TABLET: 5; 325 TABLET ORAL at 11:33

## 2019-05-11 RX ADMIN — VANCOMYCIN HYDROCHLORIDE 500 MG: 500 INJECTION, SOLUTION INTRAVENOUS at 19:40

## 2019-05-11 RX ADMIN — GABAPENTIN 100 MG: 100 CAPSULE ORAL at 22:15

## 2019-05-11 RX ADMIN — HYDROMORPHONE HYDROCHLORIDE 0.5 MG: 1 INJECTION, SOLUTION INTRAMUSCULAR; INTRAVENOUS; SUBCUTANEOUS at 20:36

## 2019-05-11 RX ADMIN — POLYETHYLENE GLYCOL 3350 17 G: 17 POWDER, FOR SOLUTION ORAL at 09:04

## 2019-05-11 RX ADMIN — HYDROCODONE BITARTRATE AND ACETAMINOPHEN 2 TABLET: 5; 325 TABLET ORAL at 23:50

## 2019-05-11 RX ADMIN — SODIUM CHLORIDE, SODIUM LACTATE, POTASSIUM CHLORIDE, AND CALCIUM CHLORIDE 125 ML/HR: .6; .31; .03; .02 INJECTION, SOLUTION INTRAVENOUS at 06:26

## 2019-05-11 RX ADMIN — DOCUSATE SODIUM 100 MG: 100 CAPSULE, LIQUID FILLED ORAL at 17:28

## 2019-05-11 RX ADMIN — HYDROCODONE BITARTRATE AND ACETAMINOPHEN 2 TABLET: 5; 325 TABLET ORAL at 18:34

## 2019-05-11 RX ADMIN — LIDOCAINE 1 PATCH: 50 PATCH CUTANEOUS at 09:03

## 2019-05-11 RX ADMIN — METHOCARBAMOL 750 MG: 750 TABLET, FILM COATED ORAL at 06:29

## 2019-05-11 RX ADMIN — VANCOMYCIN HYDROCHLORIDE 1000 MG: 1 INJECTION, SOLUTION INTRAVENOUS at 06:30

## 2019-05-11 RX ADMIN — DOCUSATE SODIUM 100 MG: 100 CAPSULE, LIQUID FILLED ORAL at 06:29

## 2019-05-11 RX ADMIN — HYDROCODONE BITARTRATE AND ACETAMINOPHEN 1 TABLET: 5; 325 TABLET ORAL at 04:34

## 2019-05-11 RX ADMIN — HYDROCODONE BITARTRATE AND ACETAMINOPHEN 1 TABLET: 5; 325 TABLET ORAL at 22:15

## 2019-05-11 RX ADMIN — ENOXAPARIN SODIUM 40 MG: 40 INJECTION SUBCUTANEOUS at 09:03

## 2019-05-11 RX ADMIN — MELATONIN 6 MG: at 22:15

## 2019-05-11 RX ADMIN — MONTELUKAST SODIUM 10 MG: 10 TABLET, FILM COATED ORAL at 09:03

## 2019-05-11 RX ADMIN — HYDROMORPHONE HYDROCHLORIDE 0.5 MG: 1 INJECTION, SOLUTION INTRAMUSCULAR; INTRAVENOUS; SUBCUTANEOUS at 06:24

## 2019-05-11 RX ADMIN — VANCOMYCIN HYDROCHLORIDE 1000 MG: 1 INJECTION, SOLUTION INTRAVENOUS at 17:28

## 2019-05-12 ENCOUNTER — APPOINTMENT (INPATIENT)
Dept: RADIOLOGY | Facility: HOSPITAL | Age: 29
DRG: 486 | End: 2019-05-12
Payer: COMMERCIAL

## 2019-05-12 LAB
ALBUMIN SERPL BCP-MCNC: 2.1 G/DL (ref 3.5–5)
ALP SERPL-CCNC: 278 U/L (ref 46–116)
ALT SERPL W P-5'-P-CCNC: 199 U/L (ref 12–78)
ANION GAP SERPL CALCULATED.3IONS-SCNC: 6 MMOL/L (ref 4–13)
AST SERPL W P-5'-P-CCNC: 140 U/L (ref 5–45)
B BURGDOR DNA SPEC QL NAA+PROBE: NEGATIVE
BACTERIA TISS AEROBE CULT: ABNORMAL
BASOPHILS # BLD AUTO: 0.05 THOUSANDS/ΜL (ref 0–0.1)
BASOPHILS NFR BLD AUTO: 1 % (ref 0–1)
BILIRUB SERPL-MCNC: 0.43 MG/DL (ref 0.2–1)
BUN SERPL-MCNC: 7 MG/DL (ref 5–25)
CALCIUM SERPL-MCNC: 9.2 MG/DL (ref 8.3–10.1)
CHLORIDE SERPL-SCNC: 104 MMOL/L (ref 100–108)
CO2 SERPL-SCNC: 29 MMOL/L (ref 21–32)
CREAT SERPL-MCNC: 0.54 MG/DL (ref 0.6–1.3)
EOSINOPHIL # BLD AUTO: 0.16 THOUSAND/ΜL (ref 0–0.61)
EOSINOPHIL NFR BLD AUTO: 2 % (ref 0–6)
ERYTHROCYTE [DISTWIDTH] IN BLOOD BY AUTOMATED COUNT: 13.2 % (ref 11.6–15.1)
GFR SERPL CREATININE-BSD FRML MDRD: 145 ML/MIN/1.73SQ M
GLUCOSE SERPL-MCNC: 101 MG/DL (ref 65–140)
GRAM STN SPEC: ABNORMAL
HBV CORE AB SER QL: ABNORMAL
HBV CORE IGM SER QL: ABNORMAL
HBV SURFACE AG SER QL: ABNORMAL
HCT VFR BLD AUTO: 31 % (ref 36.5–49.3)
HCV AB SER QL: ABNORMAL
HGB BLD-MCNC: 9.4 G/DL (ref 12–17)
IMM GRANULOCYTES # BLD AUTO: 0.06 THOUSAND/UL (ref 0–0.2)
IMM GRANULOCYTES NFR BLD AUTO: 1 % (ref 0–2)
LYMPHOCYTES # BLD AUTO: 2.27 THOUSANDS/ΜL (ref 0.6–4.47)
LYMPHOCYTES NFR BLD AUTO: 21 % (ref 14–44)
MCH RBC QN AUTO: 27.5 PG (ref 26.8–34.3)
MCHC RBC AUTO-ENTMCNC: 30.3 G/DL (ref 31.4–37.4)
MCV RBC AUTO: 91 FL (ref 82–98)
MONOCYTES # BLD AUTO: 1 THOUSAND/ΜL (ref 0.17–1.22)
MONOCYTES NFR BLD AUTO: 9 % (ref 4–12)
NEUTROPHILS # BLD AUTO: 7.18 THOUSANDS/ΜL (ref 1.85–7.62)
NEUTS SEG NFR BLD AUTO: 66 % (ref 43–75)
NRBC BLD AUTO-RTO: 0 /100 WBCS
PLATELET # BLD AUTO: 577 THOUSANDS/UL (ref 149–390)
PMV BLD AUTO: 8.2 FL (ref 8.9–12.7)
POTASSIUM SERPL-SCNC: 3.6 MMOL/L (ref 3.5–5.3)
PROT SERPL-MCNC: 7.1 G/DL (ref 6.4–8.2)
RBC # BLD AUTO: 3.42 MILLION/UL (ref 3.88–5.62)
SODIUM SERPL-SCNC: 139 MMOL/L (ref 136–145)
VANCOMYCIN TROUGH SERPL-MCNC: 22.3 UG/ML (ref 10–20)
WBC # BLD AUTO: 10.72 THOUSAND/UL (ref 4.31–10.16)

## 2019-05-12 PROCEDURE — 80202 ASSAY OF VANCOMYCIN: CPT | Performed by: INTERNAL MEDICINE

## 2019-05-12 PROCEDURE — 99232 SBSQ HOSP IP/OBS MODERATE 35: CPT | Performed by: INTERNAL MEDICINE

## 2019-05-12 PROCEDURE — 85025 COMPLETE CBC W/AUTO DIFF WBC: CPT | Performed by: INTERNAL MEDICINE

## 2019-05-12 PROCEDURE — NS001 PR NO SIGNATURE OR ATTESTATION: Performed by: ORTHOPAEDIC SURGERY

## 2019-05-12 PROCEDURE — 80053 COMPREHEN METABOLIC PANEL: CPT | Performed by: INTERNAL MEDICINE

## 2019-05-12 PROCEDURE — 94762 N-INVAS EAR/PLS OXIMTRY CONT: CPT

## 2019-05-12 PROCEDURE — 76705 ECHO EXAM OF ABDOMEN: CPT

## 2019-05-12 PROCEDURE — 99232 SBSQ HOSP IP/OBS MODERATE 35: CPT | Performed by: NURSE PRACTITIONER

## 2019-05-12 RX ORDER — SODIUM CHLORIDE 9 MG/ML
75 INJECTION, SOLUTION INTRAVENOUS CONTINUOUS
Status: DISCONTINUED | OUTPATIENT
Start: 2019-05-12 | End: 2019-05-14

## 2019-05-12 RX ORDER — LORAZEPAM 2 MG/ML
1 INJECTION INTRAMUSCULAR EVERY 2 HOUR PRN
Status: DISCONTINUED | OUTPATIENT
Start: 2019-05-12 | End: 2019-05-16

## 2019-05-12 RX ORDER — HALOPERIDOL 5 MG/ML
2 INJECTION INTRAMUSCULAR
Status: DISCONTINUED | OUTPATIENT
Start: 2019-05-12 | End: 2019-05-16

## 2019-05-12 RX ORDER — GLYCOPYRROLATE 0.2 MG/ML
0.2 INJECTION INTRAMUSCULAR; INTRAVENOUS EVERY 4 HOURS PRN
Status: DISCONTINUED | OUTPATIENT
Start: 2019-05-12 | End: 2019-05-16

## 2019-05-12 RX ADMIN — VANCOMYCIN HYDROCHLORIDE 1500 MG: 10 INJECTION, POWDER, LYOPHILIZED, FOR SOLUTION INTRAVENOUS at 19:57

## 2019-05-12 RX ADMIN — HYDROMORPHONE HYDROCHLORIDE 0.5 MG: 1 INJECTION, SOLUTION INTRAMUSCULAR; INTRAVENOUS; SUBCUTANEOUS at 21:43

## 2019-05-12 RX ADMIN — GABAPENTIN 100 MG: 100 CAPSULE ORAL at 17:33

## 2019-05-12 RX ADMIN — HYDROCODONE BITARTRATE AND ACETAMINOPHEN 1 TABLET: 5; 325 TABLET ORAL at 17:32

## 2019-05-12 RX ADMIN — POLYETHYLENE GLYCOL 3350 17 G: 17 POWDER, FOR SOLUTION ORAL at 08:30

## 2019-05-12 RX ADMIN — HYDROMORPHONE HYDROCHLORIDE 0.5 MG: 1 INJECTION, SOLUTION INTRAMUSCULAR; INTRAVENOUS; SUBCUTANEOUS at 20:07

## 2019-05-12 RX ADMIN — HYDROCODONE BITARTRATE AND ACETAMINOPHEN 2 TABLET: 5; 325 TABLET ORAL at 05:26

## 2019-05-12 RX ADMIN — SODIUM CHLORIDE 75 ML/HR: 0.9 INJECTION, SOLUTION INTRAVENOUS at 11:13

## 2019-05-12 RX ADMIN — METHOCARBAMOL 750 MG: 750 TABLET, FILM COATED ORAL at 23:18

## 2019-05-12 RX ADMIN — DOCUSATE SODIUM 100 MG: 100 CAPSULE, LIQUID FILLED ORAL at 05:26

## 2019-05-12 RX ADMIN — VANCOMYCIN HYDROCHLORIDE 1500 MG: 10 INJECTION, POWDER, LYOPHILIZED, FOR SOLUTION INTRAVENOUS at 04:23

## 2019-05-12 RX ADMIN — HYDROMORPHONE HYDROCHLORIDE 0.5 MG: 1 INJECTION, SOLUTION INTRAMUSCULAR; INTRAVENOUS; SUBCUTANEOUS at 00:45

## 2019-05-12 RX ADMIN — DOCUSATE SODIUM 100 MG: 100 CAPSULE, LIQUID FILLED ORAL at 17:32

## 2019-05-12 RX ADMIN — HYDROMORPHONE HYDROCHLORIDE 0.5 MG: 1 INJECTION, SOLUTION INTRAMUSCULAR; INTRAVENOUS; SUBCUTANEOUS at 08:36

## 2019-05-12 RX ADMIN — GABAPENTIN 100 MG: 100 CAPSULE ORAL at 20:03

## 2019-05-12 RX ADMIN — METHOCARBAMOL 750 MG: 750 TABLET, FILM COATED ORAL at 13:28

## 2019-05-12 RX ADMIN — GABAPENTIN 100 MG: 100 CAPSULE ORAL at 08:30

## 2019-05-12 RX ADMIN — HYDROCODONE BITARTRATE AND ACETAMINOPHEN 2 TABLET: 5; 325 TABLET ORAL at 23:19

## 2019-05-12 RX ADMIN — METHOCARBAMOL 750 MG: 750 TABLET, FILM COATED ORAL at 00:45

## 2019-05-12 RX ADMIN — METHOCARBAMOL 750 MG: 750 TABLET, FILM COATED ORAL at 17:32

## 2019-05-12 RX ADMIN — METHOCARBAMOL 750 MG: 750 TABLET, FILM COATED ORAL at 05:26

## 2019-05-12 RX ADMIN — LIDOCAINE 1 PATCH: 50 PATCH CUTANEOUS at 08:30

## 2019-05-12 RX ADMIN — MONTELUKAST SODIUM 10 MG: 10 TABLET, FILM COATED ORAL at 08:30

## 2019-05-12 RX ADMIN — MELATONIN 3 MG: at 21:38

## 2019-05-12 RX ADMIN — HYDROCODONE BITARTRATE AND ACETAMINOPHEN 2 TABLET: 5; 325 TABLET ORAL at 13:28

## 2019-05-12 RX ADMIN — VANCOMYCIN HYDROCHLORIDE 1500 MG: 10 INJECTION, POWDER, LYOPHILIZED, FOR SOLUTION INTRAVENOUS at 13:29

## 2019-05-12 RX ADMIN — SODIUM CHLORIDE 0.1 MG/KG/HR: 0.9 INJECTION, SOLUTION INTRAVENOUS at 11:14

## 2019-05-12 RX ADMIN — ENOXAPARIN SODIUM 40 MG: 40 INJECTION SUBCUTANEOUS at 08:30

## 2019-05-13 ENCOUNTER — APPOINTMENT (INPATIENT)
Dept: RADIOLOGY | Facility: HOSPITAL | Age: 29
DRG: 486 | End: 2019-05-13
Payer: COMMERCIAL

## 2019-05-13 LAB
ALBUMIN SERPL BCP-MCNC: 2.3 G/DL (ref 3.5–5)
ALP SERPL-CCNC: 294 U/L (ref 46–116)
ALT SERPL W P-5'-P-CCNC: 181 U/L (ref 12–78)
ANION GAP SERPL CALCULATED.3IONS-SCNC: 7 MMOL/L (ref 4–13)
AST SERPL W P-5'-P-CCNC: 92 U/L (ref 5–45)
BACTERIA BLD CULT: NORMAL
BACTERIA SPEC ANAEROBE CULT: NORMAL
BILIRUB SERPL-MCNC: 0.46 MG/DL (ref 0.2–1)
BUN SERPL-MCNC: 5 MG/DL (ref 5–25)
CALCIUM SERPL-MCNC: 9.7 MG/DL (ref 8.3–10.1)
CHLORIDE SERPL-SCNC: 105 MMOL/L (ref 100–108)
CO2 SERPL-SCNC: 26 MMOL/L (ref 21–32)
CREAT SERPL-MCNC: 0.43 MG/DL (ref 0.6–1.3)
GFR SERPL CREATININE-BSD FRML MDRD: 159 ML/MIN/1.73SQ M
GLUCOSE SERPL-MCNC: 103 MG/DL (ref 65–140)
POTASSIUM SERPL-SCNC: 3.8 MMOL/L (ref 3.5–5.3)
PROT SERPL-MCNC: 7.5 G/DL (ref 6.4–8.2)
SODIUM SERPL-SCNC: 138 MMOL/L (ref 136–145)

## 2019-05-13 PROCEDURE — 97163 PT EVAL HIGH COMPLEX 45 MIN: CPT

## 2019-05-13 PROCEDURE — G8982 BODY POS GOAL STATUS: HCPCS

## 2019-05-13 PROCEDURE — G8988 SELF CARE GOAL STATUS: HCPCS

## 2019-05-13 PROCEDURE — 71046 X-RAY EXAM CHEST 2 VIEWS: CPT

## 2019-05-13 PROCEDURE — 94762 N-INVAS EAR/PLS OXIMTRY CONT: CPT

## 2019-05-13 PROCEDURE — 87522 HEPATITIS C REVRS TRNSCRPJ: CPT | Performed by: INTERNAL MEDICINE

## 2019-05-13 PROCEDURE — 97167 OT EVAL HIGH COMPLEX 60 MIN: CPT

## 2019-05-13 PROCEDURE — G8987 SELF CARE CURRENT STATUS: HCPCS

## 2019-05-13 PROCEDURE — 80053 COMPREHEN METABOLIC PANEL: CPT | Performed by: NURSE PRACTITIONER

## 2019-05-13 PROCEDURE — 99232 SBSQ HOSP IP/OBS MODERATE 35: CPT | Performed by: INTERNAL MEDICINE

## 2019-05-13 PROCEDURE — 99232 SBSQ HOSP IP/OBS MODERATE 35: CPT | Performed by: NURSE PRACTITIONER

## 2019-05-13 PROCEDURE — G8981 BODY POS CURRENT STATUS: HCPCS

## 2019-05-13 PROCEDURE — NS001 PR NO SIGNATURE OR ATTESTATION: Performed by: ORTHOPAEDIC SURGERY

## 2019-05-13 RX ADMIN — DOCUSATE SODIUM 100 MG: 100 CAPSULE, LIQUID FILLED ORAL at 16:58

## 2019-05-13 RX ADMIN — HYDROMORPHONE HYDROCHLORIDE 0.5 MG: 1 INJECTION, SOLUTION INTRAMUSCULAR; INTRAVENOUS; SUBCUTANEOUS at 19:38

## 2019-05-13 RX ADMIN — METHOCARBAMOL 750 MG: 750 TABLET, FILM COATED ORAL at 06:00

## 2019-05-13 RX ADMIN — METHOCARBAMOL 750 MG: 750 TABLET, FILM COATED ORAL at 18:46

## 2019-05-13 RX ADMIN — HYDROMORPHONE HYDROCHLORIDE 0.5 MG: 1 INJECTION, SOLUTION INTRAMUSCULAR; INTRAVENOUS; SUBCUTANEOUS at 02:05

## 2019-05-13 RX ADMIN — HYDROCODONE BITARTRATE AND ACETAMINOPHEN 2 TABLET: 5; 325 TABLET ORAL at 11:24

## 2019-05-13 RX ADMIN — VANCOMYCIN HYDROCHLORIDE 1500 MG: 10 INJECTION, POWDER, LYOPHILIZED, FOR SOLUTION INTRAVENOUS at 18:46

## 2019-05-13 RX ADMIN — METHOCARBAMOL 750 MG: 750 TABLET, FILM COATED ORAL at 23:42

## 2019-05-13 RX ADMIN — GABAPENTIN 100 MG: 100 CAPSULE ORAL at 09:23

## 2019-05-13 RX ADMIN — HYDROMORPHONE HYDROCHLORIDE 0.5 MG: 1 INJECTION, SOLUTION INTRAMUSCULAR; INTRAVENOUS; SUBCUTANEOUS at 22:13

## 2019-05-13 RX ADMIN — VANCOMYCIN HYDROCHLORIDE 1500 MG: 10 INJECTION, POWDER, LYOPHILIZED, FOR SOLUTION INTRAVENOUS at 03:04

## 2019-05-13 RX ADMIN — MONTELUKAST SODIUM 10 MG: 10 TABLET, FILM COATED ORAL at 09:23

## 2019-05-13 RX ADMIN — METHOCARBAMOL 750 MG: 750 TABLET, FILM COATED ORAL at 11:27

## 2019-05-13 RX ADMIN — ENOXAPARIN SODIUM 40 MG: 40 INJECTION SUBCUTANEOUS at 09:23

## 2019-05-13 RX ADMIN — SODIUM CHLORIDE 75 ML/HR: 0.9 INJECTION, SOLUTION INTRAVENOUS at 18:56

## 2019-05-13 RX ADMIN — GABAPENTIN 100 MG: 100 CAPSULE ORAL at 16:58

## 2019-05-13 RX ADMIN — DOCUSATE SODIUM 100 MG: 100 CAPSULE, LIQUID FILLED ORAL at 06:00

## 2019-05-13 RX ADMIN — SODIUM CHLORIDE 75 ML/HR: 0.9 INJECTION, SOLUTION INTRAVENOUS at 02:21

## 2019-05-13 RX ADMIN — VANCOMYCIN HYDROCHLORIDE 1500 MG: 10 INJECTION, POWDER, LYOPHILIZED, FOR SOLUTION INTRAVENOUS at 11:27

## 2019-05-13 RX ADMIN — HYDROCODONE BITARTRATE AND ACETAMINOPHEN 2 TABLET: 5; 325 TABLET ORAL at 06:00

## 2019-05-13 RX ADMIN — MELATONIN 6 MG: at 22:13

## 2019-05-13 RX ADMIN — Medication 0.2 MG/KG/HR: at 18:46

## 2019-05-13 RX ADMIN — HYDROCODONE BITARTRATE AND ACETAMINOPHEN 2 TABLET: 5; 325 TABLET ORAL at 18:46

## 2019-05-13 RX ADMIN — GABAPENTIN 100 MG: 100 CAPSULE ORAL at 22:13

## 2019-05-13 RX ADMIN — POLYETHYLENE GLYCOL 3350 17 G: 17 POWDER, FOR SOLUTION ORAL at 09:23

## 2019-05-13 RX ADMIN — HYDROMORPHONE HYDROCHLORIDE 0.5 MG: 1 INJECTION, SOLUTION INTRAMUSCULAR; INTRAVENOUS; SUBCUTANEOUS at 16:58

## 2019-05-13 RX ADMIN — LIDOCAINE 1 PATCH: 50 PATCH CUTANEOUS at 09:21

## 2019-05-13 RX ADMIN — HYDROMORPHONE HYDROCHLORIDE 0.5 MG: 1 INJECTION, SOLUTION INTRAMUSCULAR; INTRAVENOUS; SUBCUTANEOUS at 14:12

## 2019-05-14 LAB — VANCOMYCIN TROUGH SERPL-MCNC: 15.9 UG/ML (ref 10–20)

## 2019-05-14 PROCEDURE — 99232 SBSQ HOSP IP/OBS MODERATE 35: CPT | Performed by: NURSE PRACTITIONER

## 2019-05-14 PROCEDURE — NS001 PR NO SIGNATURE OR ATTESTATION: Performed by: ORTHOPAEDIC SURGERY

## 2019-05-14 PROCEDURE — 02HV33Z INSERTION OF INFUSION DEVICE INTO SUPERIOR VENA CAVA, PERCUTANEOUS APPROACH: ICD-10-PCS | Performed by: INTERNAL MEDICINE

## 2019-05-14 PROCEDURE — 99232 SBSQ HOSP IP/OBS MODERATE 35: CPT | Performed by: INTERNAL MEDICINE

## 2019-05-14 PROCEDURE — C1751 CATH, INF, PER/CENT/MIDLINE: HCPCS

## 2019-05-14 PROCEDURE — 94760 N-INVAS EAR/PLS OXIMETRY 1: CPT

## 2019-05-14 PROCEDURE — 36569 INSJ PICC 5 YR+ W/O IMAGING: CPT

## 2019-05-14 PROCEDURE — 80202 ASSAY OF VANCOMYCIN: CPT | Performed by: PHYSICIAN ASSISTANT

## 2019-05-14 RX ADMIN — VANCOMYCIN HYDROCHLORIDE 1500 MG: 10 INJECTION, POWDER, LYOPHILIZED, FOR SOLUTION INTRAVENOUS at 11:07

## 2019-05-14 RX ADMIN — VANCOMYCIN HYDROCHLORIDE 1500 MG: 10 INJECTION, POWDER, LYOPHILIZED, FOR SOLUTION INTRAVENOUS at 20:03

## 2019-05-14 RX ADMIN — HYDROCODONE BITARTRATE AND ACETAMINOPHEN 2 TABLET: 5; 325 TABLET ORAL at 13:51

## 2019-05-14 RX ADMIN — METHOCARBAMOL 750 MG: 750 TABLET, FILM COATED ORAL at 23:10

## 2019-05-14 RX ADMIN — DOCUSATE SODIUM 100 MG: 100 CAPSULE, LIQUID FILLED ORAL at 04:00

## 2019-05-14 RX ADMIN — METHOCARBAMOL 750 MG: 750 TABLET, FILM COATED ORAL at 11:07

## 2019-05-14 RX ADMIN — GABAPENTIN 100 MG: 100 CAPSULE ORAL at 09:20

## 2019-05-14 RX ADMIN — DOCUSATE SODIUM 100 MG: 100 CAPSULE, LIQUID FILLED ORAL at 17:38

## 2019-05-14 RX ADMIN — METHOCARBAMOL 750 MG: 750 TABLET, FILM COATED ORAL at 05:33

## 2019-05-14 RX ADMIN — HYDROMORPHONE HYDROCHLORIDE 0.5 MG: 1 INJECTION, SOLUTION INTRAMUSCULAR; INTRAVENOUS; SUBCUTANEOUS at 09:20

## 2019-05-14 RX ADMIN — GABAPENTIN 100 MG: 100 CAPSULE ORAL at 20:03

## 2019-05-14 RX ADMIN — HYDROCODONE BITARTRATE AND ACETAMINOPHEN 1 TABLET: 5; 325 TABLET ORAL at 22:23

## 2019-05-14 RX ADMIN — MELATONIN 6 MG: at 22:22

## 2019-05-14 RX ADMIN — LIDOCAINE 1 PATCH: 50 PATCH CUTANEOUS at 09:20

## 2019-05-14 RX ADMIN — MONTELUKAST SODIUM 10 MG: 10 TABLET, FILM COATED ORAL at 09:20

## 2019-05-14 RX ADMIN — HYDROCODONE BITARTRATE AND ACETAMINOPHEN 2 TABLET: 5; 325 TABLET ORAL at 04:00

## 2019-05-14 RX ADMIN — GABAPENTIN 100 MG: 100 CAPSULE ORAL at 17:38

## 2019-05-14 RX ADMIN — ENOXAPARIN SODIUM 40 MG: 40 INJECTION SUBCUTANEOUS at 09:20

## 2019-05-14 RX ADMIN — VANCOMYCIN HYDROCHLORIDE 1500 MG: 10 INJECTION, POWDER, LYOPHILIZED, FOR SOLUTION INTRAVENOUS at 04:00

## 2019-05-14 RX ADMIN — HYDROCODONE BITARTRATE AND ACETAMINOPHEN 2 TABLET: 5; 325 TABLET ORAL at 17:38

## 2019-05-14 RX ADMIN — METHOCARBAMOL 750 MG: 750 TABLET, FILM COATED ORAL at 17:38

## 2019-05-14 RX ADMIN — SODIUM CHLORIDE 75 ML/HR: 0.9 INJECTION, SOLUTION INTRAVENOUS at 11:07

## 2019-05-14 RX ADMIN — Medication 0.2 MG/KG/HR: at 16:13

## 2019-05-15 LAB
ALBUMIN SERPL BCP-MCNC: 2.4 G/DL (ref 3.5–5)
ALP SERPL-CCNC: 265 U/L (ref 46–116)
ALT SERPL W P-5'-P-CCNC: 174 U/L (ref 12–78)
ANION GAP SERPL CALCULATED.3IONS-SCNC: 8 MMOL/L (ref 4–13)
AST SERPL W P-5'-P-CCNC: 99 U/L (ref 5–45)
BASOPHILS # BLD AUTO: 0.04 THOUSANDS/ΜL (ref 0–0.1)
BASOPHILS NFR BLD AUTO: 0 % (ref 0–1)
BILIRUB SERPL-MCNC: 0.31 MG/DL (ref 0.2–1)
BUN SERPL-MCNC: 6 MG/DL (ref 5–25)
CALCIUM SERPL-MCNC: 9.4 MG/DL (ref 8.3–10.1)
CHLORIDE SERPL-SCNC: 107 MMOL/L (ref 100–108)
CO2 SERPL-SCNC: 25 MMOL/L (ref 21–32)
CREAT SERPL-MCNC: 0.47 MG/DL (ref 0.6–1.3)
CRP SERPL QL: 41.8 MG/L
EOSINOPHIL # BLD AUTO: 0.37 THOUSAND/ΜL (ref 0–0.61)
EOSINOPHIL NFR BLD AUTO: 3 % (ref 0–6)
ERYTHROCYTE [DISTWIDTH] IN BLOOD BY AUTOMATED COUNT: 13.2 % (ref 11.6–15.1)
ERYTHROCYTE [SEDIMENTATION RATE] IN BLOOD: 89 MM/HOUR (ref 0–10)
GFR SERPL CREATININE-BSD FRML MDRD: 153 ML/MIN/1.73SQ M
GLUCOSE SERPL-MCNC: 108 MG/DL (ref 65–140)
HCT VFR BLD AUTO: 30.3 % (ref 36.5–49.3)
HCV RNA SERPL NAA+PROBE-ACNC: NORMAL IU/ML
HGB BLD-MCNC: 9.5 G/DL (ref 12–17)
IMM GRANULOCYTES # BLD AUTO: 0.1 THOUSAND/UL (ref 0–0.2)
IMM GRANULOCYTES NFR BLD AUTO: 1 % (ref 0–2)
LYMPHOCYTES # BLD AUTO: 1.84 THOUSANDS/ΜL (ref 0.6–4.47)
LYMPHOCYTES NFR BLD AUTO: 16 % (ref 14–44)
MCH RBC QN AUTO: 27.9 PG (ref 26.8–34.3)
MCHC RBC AUTO-ENTMCNC: 31.4 G/DL (ref 31.4–37.4)
MCV RBC AUTO: 89 FL (ref 82–98)
MONOCYTES # BLD AUTO: 0.77 THOUSAND/ΜL (ref 0.17–1.22)
MONOCYTES NFR BLD AUTO: 7 % (ref 4–12)
NEUTROPHILS # BLD AUTO: 8.26 THOUSANDS/ΜL (ref 1.85–7.62)
NEUTS SEG NFR BLD AUTO: 73 % (ref 43–75)
NRBC BLD AUTO-RTO: 0 /100 WBCS
PLATELET # BLD AUTO: 502 THOUSANDS/UL (ref 149–390)
PMV BLD AUTO: 8.5 FL (ref 8.9–12.7)
POTASSIUM SERPL-SCNC: 3.6 MMOL/L (ref 3.5–5.3)
PROT SERPL-MCNC: 7.3 G/DL (ref 6.4–8.2)
RBC # BLD AUTO: 3.41 MILLION/UL (ref 3.88–5.62)
SODIUM SERPL-SCNC: 140 MMOL/L (ref 136–145)
TEST INFORMATION: NORMAL
WBC # BLD AUTO: 11.38 THOUSAND/UL (ref 4.31–10.16)

## 2019-05-15 PROCEDURE — 99232 SBSQ HOSP IP/OBS MODERATE 35: CPT | Performed by: INTERNAL MEDICINE

## 2019-05-15 PROCEDURE — 94760 N-INVAS EAR/PLS OXIMETRY 1: CPT

## 2019-05-15 PROCEDURE — 99024 POSTOP FOLLOW-UP VISIT: CPT | Performed by: ORTHOPAEDIC SURGERY

## 2019-05-15 PROCEDURE — 85025 COMPLETE CBC W/AUTO DIFF WBC: CPT | Performed by: NURSE PRACTITIONER

## 2019-05-15 PROCEDURE — 94762 N-INVAS EAR/PLS OXIMTRY CONT: CPT

## 2019-05-15 PROCEDURE — 86140 C-REACTIVE PROTEIN: CPT | Performed by: ORTHOPAEDIC SURGERY

## 2019-05-15 PROCEDURE — 85652 RBC SED RATE AUTOMATED: CPT | Performed by: ORTHOPAEDIC SURGERY

## 2019-05-15 PROCEDURE — 80053 COMPREHEN METABOLIC PANEL: CPT | Performed by: NURSE PRACTITIONER

## 2019-05-15 PROCEDURE — 99232 SBSQ HOSP IP/OBS MODERATE 35: CPT | Performed by: NURSE PRACTITIONER

## 2019-05-15 RX ORDER — GABAPENTIN 100 MG/1
200 CAPSULE ORAL 3 TIMES DAILY
Status: DISCONTINUED | OUTPATIENT
Start: 2019-05-15 | End: 2019-05-17 | Stop reason: HOSPADM

## 2019-05-15 RX ADMIN — HYDROCODONE BITARTRATE AND ACETAMINOPHEN 2 TABLET: 5; 325 TABLET ORAL at 11:11

## 2019-05-15 RX ADMIN — VANCOMYCIN HYDROCHLORIDE 1500 MG: 10 INJECTION, POWDER, LYOPHILIZED, FOR SOLUTION INTRAVENOUS at 11:11

## 2019-05-15 RX ADMIN — METHOCARBAMOL 750 MG: 750 TABLET, FILM COATED ORAL at 11:10

## 2019-05-15 RX ADMIN — LIDOCAINE 1 PATCH: 50 PATCH CUTANEOUS at 08:52

## 2019-05-15 RX ADMIN — GABAPENTIN 100 MG: 100 CAPSULE ORAL at 08:52

## 2019-05-15 RX ADMIN — ENOXAPARIN SODIUM 40 MG: 40 INJECTION SUBCUTANEOUS at 08:52

## 2019-05-15 RX ADMIN — GABAPENTIN 200 MG: 100 CAPSULE ORAL at 22:23

## 2019-05-15 RX ADMIN — Medication 0.2 MG/KG/HR: at 06:24

## 2019-05-15 RX ADMIN — METHOCARBAMOL 750 MG: 750 TABLET, FILM COATED ORAL at 04:20

## 2019-05-15 RX ADMIN — DOCUSATE SODIUM 100 MG: 100 CAPSULE, LIQUID FILLED ORAL at 17:09

## 2019-05-15 RX ADMIN — MELATONIN 6 MG: at 22:24

## 2019-05-15 RX ADMIN — METHOCARBAMOL 750 MG: 750 TABLET, FILM COATED ORAL at 17:09

## 2019-05-15 RX ADMIN — GABAPENTIN 200 MG: 100 CAPSULE ORAL at 17:00

## 2019-05-15 RX ADMIN — DOCUSATE SODIUM 100 MG: 100 CAPSULE, LIQUID FILLED ORAL at 04:20

## 2019-05-15 RX ADMIN — MONTELUKAST SODIUM 10 MG: 10 TABLET, FILM COATED ORAL at 08:52

## 2019-05-15 RX ADMIN — VANCOMYCIN HYDROCHLORIDE 1500 MG: 10 INJECTION, POWDER, LYOPHILIZED, FOR SOLUTION INTRAVENOUS at 18:43

## 2019-05-15 RX ADMIN — HYDROCODONE BITARTRATE AND ACETAMINOPHEN 2 TABLET: 5; 325 TABLET ORAL at 04:21

## 2019-05-15 RX ADMIN — HYDROCODONE BITARTRATE AND ACETAMINOPHEN 2 TABLET: 5; 325 TABLET ORAL at 17:09

## 2019-05-15 RX ADMIN — VANCOMYCIN HYDROCHLORIDE 1500 MG: 10 INJECTION, POWDER, LYOPHILIZED, FOR SOLUTION INTRAVENOUS at 02:07

## 2019-05-16 LAB
ALBUMIN SERPL BCP-MCNC: 2.6 G/DL (ref 3.5–5)
ALP SERPL-CCNC: 284 U/L (ref 46–116)
ALT SERPL W P-5'-P-CCNC: 163 U/L (ref 12–78)
ANION GAP SERPL CALCULATED.3IONS-SCNC: 7 MMOL/L (ref 4–13)
AST SERPL W P-5'-P-CCNC: 78 U/L (ref 5–45)
BASOPHILS # BLD AUTO: 0.04 THOUSANDS/ΜL (ref 0–0.1)
BASOPHILS NFR BLD AUTO: 0 % (ref 0–1)
BILIRUB SERPL-MCNC: 0.37 MG/DL (ref 0.2–1)
BUN SERPL-MCNC: 5 MG/DL (ref 5–25)
CALCIUM SERPL-MCNC: 9.7 MG/DL (ref 8.3–10.1)
CHLORIDE SERPL-SCNC: 106 MMOL/L (ref 100–108)
CO2 SERPL-SCNC: 26 MMOL/L (ref 21–32)
CREAT SERPL-MCNC: 0.52 MG/DL (ref 0.6–1.3)
EOSINOPHIL # BLD AUTO: 0.52 THOUSAND/ΜL (ref 0–0.61)
EOSINOPHIL NFR BLD AUTO: 4 % (ref 0–6)
ERYTHROCYTE [DISTWIDTH] IN BLOOD BY AUTOMATED COUNT: 13.2 % (ref 11.6–15.1)
GFR SERPL CREATININE-BSD FRML MDRD: 147 ML/MIN/1.73SQ M
GLUCOSE SERPL-MCNC: 98 MG/DL (ref 65–140)
HCT VFR BLD AUTO: 31.2 % (ref 36.5–49.3)
HGB BLD-MCNC: 9.7 G/DL (ref 12–17)
IMM GRANULOCYTES # BLD AUTO: 0.1 THOUSAND/UL (ref 0–0.2)
IMM GRANULOCYTES NFR BLD AUTO: 1 % (ref 0–2)
LYMPHOCYTES # BLD AUTO: 2.66 THOUSANDS/ΜL (ref 0.6–4.47)
LYMPHOCYTES NFR BLD AUTO: 20 % (ref 14–44)
MCH RBC QN AUTO: 27.6 PG (ref 26.8–34.3)
MCHC RBC AUTO-ENTMCNC: 31.1 G/DL (ref 31.4–37.4)
MCV RBC AUTO: 89 FL (ref 82–98)
MONOCYTES # BLD AUTO: 1.03 THOUSAND/ΜL (ref 0.17–1.22)
MONOCYTES NFR BLD AUTO: 8 % (ref 4–12)
NEUTROPHILS # BLD AUTO: 9.14 THOUSANDS/ΜL (ref 1.85–7.62)
NEUTS SEG NFR BLD AUTO: 67 % (ref 43–75)
NRBC BLD AUTO-RTO: 0 /100 WBCS
PLATELET # BLD AUTO: 509 THOUSANDS/UL (ref 149–390)
PMV BLD AUTO: 9.2 FL (ref 8.9–12.7)
POTASSIUM SERPL-SCNC: 3.6 MMOL/L (ref 3.5–5.3)
PROT SERPL-MCNC: 7.8 G/DL (ref 6.4–8.2)
RBC # BLD AUTO: 3.52 MILLION/UL (ref 3.88–5.62)
SODIUM SERPL-SCNC: 139 MMOL/L (ref 136–145)
WBC # BLD AUTO: 13.49 THOUSAND/UL (ref 4.31–10.16)

## 2019-05-16 PROCEDURE — 97530 THERAPEUTIC ACTIVITIES: CPT

## 2019-05-16 PROCEDURE — 99232 SBSQ HOSP IP/OBS MODERATE 35: CPT | Performed by: INTERNAL MEDICINE

## 2019-05-16 PROCEDURE — 97116 GAIT TRAINING THERAPY: CPT

## 2019-05-16 PROCEDURE — 85025 COMPLETE CBC W/AUTO DIFF WBC: CPT | Performed by: NURSE PRACTITIONER

## 2019-05-16 PROCEDURE — 97535 SELF CARE MNGMENT TRAINING: CPT

## 2019-05-16 PROCEDURE — NS001 PR NO SIGNATURE OR ATTESTATION: Performed by: ORTHOPAEDIC SURGERY

## 2019-05-16 PROCEDURE — 80053 COMPREHEN METABOLIC PANEL: CPT | Performed by: NURSE PRACTITIONER

## 2019-05-16 RX ADMIN — VANCOMYCIN HYDROCHLORIDE 1500 MG: 10 INJECTION, POWDER, LYOPHILIZED, FOR SOLUTION INTRAVENOUS at 02:37

## 2019-05-16 RX ADMIN — METHOCARBAMOL 750 MG: 750 TABLET, FILM COATED ORAL at 11:00

## 2019-05-16 RX ADMIN — GABAPENTIN 200 MG: 100 CAPSULE ORAL at 15:42

## 2019-05-16 RX ADMIN — GABAPENTIN 200 MG: 100 CAPSULE ORAL at 21:40

## 2019-05-16 RX ADMIN — METHOCARBAMOL 750 MG: 750 TABLET, FILM COATED ORAL at 23:45

## 2019-05-16 RX ADMIN — HYDROCODONE BITARTRATE AND ACETAMINOPHEN 2 TABLET: 5; 325 TABLET ORAL at 23:45

## 2019-05-16 RX ADMIN — MELATONIN 6 MG: at 21:40

## 2019-05-16 RX ADMIN — DOCUSATE SODIUM 100 MG: 100 CAPSULE, LIQUID FILLED ORAL at 04:57

## 2019-05-16 RX ADMIN — HYDROCODONE BITARTRATE AND ACETAMINOPHEN 2 TABLET: 5; 325 TABLET ORAL at 10:56

## 2019-05-16 RX ADMIN — ENOXAPARIN SODIUM 40 MG: 40 INJECTION SUBCUTANEOUS at 08:41

## 2019-05-16 RX ADMIN — LIDOCAINE 1 PATCH: 50 PATCH CUTANEOUS at 08:41

## 2019-05-16 RX ADMIN — METHOCARBAMOL 750 MG: 750 TABLET, FILM COATED ORAL at 00:15

## 2019-05-16 RX ADMIN — GABAPENTIN 200 MG: 100 CAPSULE ORAL at 08:41

## 2019-05-16 RX ADMIN — VANCOMYCIN HYDROCHLORIDE 1500 MG: 10 INJECTION, POWDER, LYOPHILIZED, FOR SOLUTION INTRAVENOUS at 10:56

## 2019-05-16 RX ADMIN — DOCUSATE SODIUM 100 MG: 100 CAPSULE, LIQUID FILLED ORAL at 17:46

## 2019-05-16 RX ADMIN — METHOCARBAMOL 750 MG: 750 TABLET, FILM COATED ORAL at 17:46

## 2019-05-16 RX ADMIN — HYDROCODONE BITARTRATE AND ACETAMINOPHEN 1 TABLET: 5; 325 TABLET ORAL at 17:46

## 2019-05-16 RX ADMIN — MONTELUKAST SODIUM 10 MG: 10 TABLET, FILM COATED ORAL at 08:41

## 2019-05-16 RX ADMIN — VANCOMYCIN HYDROCHLORIDE 1500 MG: 10 INJECTION, POWDER, LYOPHILIZED, FOR SOLUTION INTRAVENOUS at 18:53

## 2019-05-16 RX ADMIN — METHOCARBAMOL 750 MG: 750 TABLET, FILM COATED ORAL at 05:00

## 2019-05-16 RX ADMIN — HYDROCODONE BITARTRATE AND ACETAMINOPHEN 1 TABLET: 5; 325 TABLET ORAL at 04:57

## 2019-05-17 ENCOUNTER — TELEPHONE (OUTPATIENT)
Dept: INFECTIOUS DISEASES | Facility: CLINIC | Age: 29
End: 2019-05-17

## 2019-05-17 ENCOUNTER — TELEPHONE (OUTPATIENT)
Dept: OBGYN CLINIC | Facility: HOSPITAL | Age: 29
End: 2019-05-17

## 2019-05-17 VITALS
WEIGHT: 154.98 LBS | DIASTOLIC BLOOD PRESSURE: 73 MMHG | OXYGEN SATURATION: 98 % | HEART RATE: 75 BPM | HEIGHT: 66 IN | RESPIRATION RATE: 20 BRPM | TEMPERATURE: 98.5 F | SYSTOLIC BLOOD PRESSURE: 128 MMHG | BODY MASS INDEX: 24.91 KG/M2

## 2019-05-17 DIAGNOSIS — M00.062 STAPHYLOCOCCAL ARTHRITIS OF LEFT KNEE (HCC): Primary | ICD-10-CM

## 2019-05-17 PROCEDURE — 99239 HOSP IP/OBS DSCHRG MGMT >30: CPT | Performed by: INTERNAL MEDICINE

## 2019-05-17 PROCEDURE — 99232 SBSQ HOSP IP/OBS MODERATE 35: CPT | Performed by: INTERNAL MEDICINE

## 2019-05-17 PROCEDURE — NS001 PR NO SIGNATURE OR ATTESTATION: Performed by: ORTHOPAEDIC SURGERY

## 2019-05-17 RX ORDER — HYDROCODONE BITARTRATE AND ACETAMINOPHEN 5; 325 MG/1; MG/1
TABLET ORAL
Qty: 30 TABLET | Refills: 0 | Status: SHIPPED | OUTPATIENT
Start: 2019-05-17 | End: 2019-06-20 | Stop reason: HOSPADM

## 2019-05-17 RX ORDER — METHOCARBAMOL 750 MG/1
750 TABLET, FILM COATED ORAL EVERY 6 HOURS PRN
Qty: 120 TABLET | Refills: 0 | Status: SHIPPED | OUTPATIENT
Start: 2019-05-17 | End: 2019-07-22 | Stop reason: SDUPTHER

## 2019-05-17 RX ORDER — GABAPENTIN 100 MG/1
200 CAPSULE ORAL 3 TIMES DAILY
Qty: 90 CAPSULE | Refills: 0 | Status: SHIPPED | OUTPATIENT
Start: 2019-05-17 | End: 2019-06-06 | Stop reason: SDUPTHER

## 2019-05-17 RX ADMIN — POLYETHYLENE GLYCOL 3350 17 G: 17 POWDER, FOR SOLUTION ORAL at 09:54

## 2019-05-17 RX ADMIN — VANCOMYCIN HYDROCHLORIDE 1500 MG: 10 INJECTION, POWDER, LYOPHILIZED, FOR SOLUTION INTRAVENOUS at 04:30

## 2019-05-17 RX ADMIN — METHOCARBAMOL 750 MG: 750 TABLET, FILM COATED ORAL at 04:37

## 2019-05-17 RX ADMIN — GABAPENTIN 200 MG: 100 CAPSULE ORAL at 09:55

## 2019-05-17 RX ADMIN — HYDROCODONE BITARTRATE AND ACETAMINOPHEN 1 TABLET: 5; 325 TABLET ORAL at 04:31

## 2019-05-17 RX ADMIN — DOCUSATE SODIUM 100 MG: 100 CAPSULE, LIQUID FILLED ORAL at 04:31

## 2019-05-17 RX ADMIN — HYDROCODONE BITARTRATE AND ACETAMINOPHEN 2 TABLET: 5; 325 TABLET ORAL at 09:54

## 2019-05-17 RX ADMIN — MONTELUKAST SODIUM 10 MG: 10 TABLET, FILM COATED ORAL at 09:54

## 2019-05-22 ENCOUNTER — TELEPHONE (OUTPATIENT)
Dept: OBGYN CLINIC | Facility: CLINIC | Age: 29
End: 2019-05-22

## 2019-05-22 DIAGNOSIS — Z98.890 STATUS POST SURGERY: ICD-10-CM

## 2019-05-22 RX ORDER — IBUPROFEN 800 MG/1
800 TABLET ORAL EVERY 8 HOURS PRN
Qty: 30 TABLET | Refills: 0 | Status: SHIPPED | OUTPATIENT
Start: 2019-05-22 | End: 2019-06-10 | Stop reason: SDUPTHER

## 2019-05-28 ENCOUNTER — APPOINTMENT (OUTPATIENT)
Dept: LAB | Age: 29
End: 2019-05-28
Payer: COMMERCIAL

## 2019-05-28 ENCOUNTER — OFFICE VISIT (OUTPATIENT)
Dept: OBGYN CLINIC | Facility: HOSPITAL | Age: 29
End: 2019-05-28

## 2019-05-28 ENCOUNTER — HOSPITAL ENCOUNTER (OUTPATIENT)
Dept: RADIOLOGY | Facility: HOSPITAL | Age: 29
Discharge: HOME | End: 2019-05-28
Attending: ORTHOPAEDIC SURGERY
Payer: COMMERCIAL

## 2019-05-28 ENCOUNTER — TRANSCRIBE ORDERS (OUTPATIENT)
Dept: ADMINISTRATIVE | Age: 29
End: 2019-05-28

## 2019-05-28 VITALS
SYSTOLIC BLOOD PRESSURE: 116 MMHG | DIASTOLIC BLOOD PRESSURE: 71 MMHG | HEIGHT: 66 IN | BODY MASS INDEX: 25.01 KG/M2 | HEART RATE: 79 BPM

## 2019-05-28 DIAGNOSIS — M71.162 SEPTIC INFRAPATELLAR BURSITIS OF LEFT KNEE: Primary | ICD-10-CM

## 2019-05-28 DIAGNOSIS — B96.89 SEPTIC INFRAPATELLAR BURSITIS OF LEFT KNEE: Primary | ICD-10-CM

## 2019-05-28 DIAGNOSIS — M71.162 SEPTIC INFRAPATELLAR BURSITIS OF LEFT KNEE: ICD-10-CM

## 2019-05-28 DIAGNOSIS — Z98.890 STATUS POST SURGERY: ICD-10-CM

## 2019-05-28 DIAGNOSIS — M25.562 ACUTE PAIN OF LEFT KNEE: ICD-10-CM

## 2019-05-28 DIAGNOSIS — A41.9 SEPSIS, DUE TO UNSPECIFIED ORGANISM: Primary | ICD-10-CM

## 2019-05-28 DIAGNOSIS — M25.562 ACUTE PAIN OF LEFT KNEE: Primary | ICD-10-CM

## 2019-05-28 DIAGNOSIS — M00.062 STAPHYLOCOCCAL ARTHRITIS OF LEFT KNEE (HCC): ICD-10-CM

## 2019-05-28 DIAGNOSIS — B96.89 SEPTIC INFRAPATELLAR BURSITIS OF LEFT KNEE: ICD-10-CM

## 2019-05-28 DIAGNOSIS — A41.9 SEPSIS, DUE TO UNSPECIFIED ORGANISM: ICD-10-CM

## 2019-05-28 LAB
ANION GAP SERPL CALCULATED.3IONS-SCNC: 10 MMOL/L (ref 4–13)
BASOPHILS # BLD AUTO: 0.05 THOUSANDS/ΜL (ref 0–0.1)
BASOPHILS NFR BLD AUTO: 1 % (ref 0–1)
BUN SERPL-MCNC: 9 MG/DL (ref 5–25)
CALCIUM SERPL-MCNC: 9 MG/DL (ref 8.3–10.1)
CHLORIDE SERPL-SCNC: 107 MMOL/L (ref 100–108)
CO2 SERPL-SCNC: 23 MMOL/L (ref 21–32)
CREAT SERPL-MCNC: 0.54 MG/DL (ref 0.6–1.3)
EOSINOPHIL # BLD AUTO: 1.06 THOUSAND/ΜL (ref 0–0.61)
EOSINOPHIL NFR BLD AUTO: 17 % (ref 0–6)
ERYTHROCYTE [DISTWIDTH] IN BLOOD BY AUTOMATED COUNT: 14 % (ref 11.6–15.1)
GFR SERPL CREATININE-BSD FRML MDRD: 145 ML/MIN/1.73SQ M
GLUCOSE SERPL-MCNC: 114 MG/DL (ref 65–140)
HCT VFR BLD AUTO: 36.7 % (ref 36.5–49.3)
HGB BLD-MCNC: 11.4 G/DL (ref 12–17)
IMM GRANULOCYTES # BLD AUTO: 0.02 THOUSAND/UL (ref 0–0.2)
IMM GRANULOCYTES NFR BLD AUTO: 0 % (ref 0–2)
LYMPHOCYTES # BLD AUTO: 1.34 THOUSANDS/ΜL (ref 0.6–4.47)
LYMPHOCYTES NFR BLD AUTO: 21 % (ref 14–44)
MCH RBC QN AUTO: 27.5 PG (ref 26.8–34.3)
MCHC RBC AUTO-ENTMCNC: 31.1 G/DL (ref 31.4–37.4)
MCV RBC AUTO: 89 FL (ref 82–98)
MONOCYTES # BLD AUTO: 0.74 THOUSAND/ΜL (ref 0.17–1.22)
MONOCYTES NFR BLD AUTO: 12 % (ref 4–12)
NEUTROPHILS # BLD AUTO: 3.2 THOUSANDS/ΜL (ref 1.85–7.62)
NEUTS SEG NFR BLD AUTO: 49 % (ref 43–75)
NRBC BLD AUTO-RTO: 0 /100 WBCS
PLATELET # BLD AUTO: 268 THOUSANDS/UL (ref 149–390)
PMV BLD AUTO: 10 FL (ref 8.9–12.7)
POTASSIUM SERPL-SCNC: 3.7 MMOL/L (ref 3.5–5.3)
RBC # BLD AUTO: 4.14 MILLION/UL (ref 3.88–5.62)
SODIUM SERPL-SCNC: 140 MMOL/L (ref 136–145)
VANCOMYCIN TROUGH SERPL-MCNC: 14.9 UG/ML (ref 10–20)
WBC # BLD AUTO: 6.41 THOUSAND/UL (ref 4.31–10.16)

## 2019-05-28 PROCEDURE — 73590 X-RAY EXAM OF LOWER LEG: CPT

## 2019-05-28 PROCEDURE — 73560 X-RAY EXAM OF KNEE 1 OR 2: CPT

## 2019-05-28 PROCEDURE — 80048 BASIC METABOLIC PNL TOTAL CA: CPT

## 2019-05-28 PROCEDURE — 36415 COLL VENOUS BLD VENIPUNCTURE: CPT

## 2019-05-28 PROCEDURE — 85025 COMPLETE CBC W/AUTO DIFF WBC: CPT

## 2019-05-28 PROCEDURE — 80202 ASSAY OF VANCOMYCIN: CPT

## 2019-05-28 PROCEDURE — 99024 POSTOP FOLLOW-UP VISIT: CPT | Performed by: ORTHOPAEDIC SURGERY

## 2019-05-28 PROCEDURE — 73552 X-RAY EXAM OF FEMUR 2/>: CPT

## 2019-05-30 ENCOUNTER — HOSPITAL ENCOUNTER (INPATIENT)
Facility: HOSPITAL | Age: 29
LOS: 3 days | Discharge: HOME HEALTH | DRG: 315 | End: 2019-06-03
Attending: EMERGENCY MEDICINE | Admitting: HOSPITALIST
Payer: COMMERCIAL

## 2019-05-30 ENCOUNTER — TELEPHONE (OUTPATIENT)
Dept: INFECTIOUS DISEASES | Facility: CLINIC | Age: 29
End: 2019-05-30

## 2019-05-30 ENCOUNTER — TELEPHONE (OUTPATIENT)
Dept: OTHER | Facility: HOSPITAL | Age: 29
End: 2019-05-30

## 2019-05-30 ENCOUNTER — TELEPHONE (OUTPATIENT)
Dept: OBGYN CLINIC | Facility: HOSPITAL | Age: 29
End: 2019-05-30

## 2019-05-30 DIAGNOSIS — R74.8 ELEVATED CPK: ICD-10-CM

## 2019-05-30 DIAGNOSIS — M00.062 STAPHYLOCOCCAL ARTHRITIS OF LEFT KNEE (HCC): ICD-10-CM

## 2019-05-30 DIAGNOSIS — R50.9 FEVER, UNKNOWN ORIGIN: Primary | ICD-10-CM

## 2019-05-30 DIAGNOSIS — R00.0 TACHYCARDIA: ICD-10-CM

## 2019-05-30 DIAGNOSIS — R74.01 TRANSAMINITIS: Chronic | ICD-10-CM

## 2019-05-30 DIAGNOSIS — Z45.2 PICC (PERIPHERALLY INSERTED CENTRAL CATHETER) REMOVAL: ICD-10-CM

## 2019-05-30 PROCEDURE — 99284 EMERGENCY DEPT VISIT MOD MDM: CPT

## 2019-05-31 ENCOUNTER — APPOINTMENT (EMERGENCY)
Dept: RADIOLOGY | Facility: HOSPITAL | Age: 29
DRG: 315 | End: 2019-05-31
Payer: COMMERCIAL

## 2019-05-31 PROBLEM — R00.0 SINUS TACHYCARDIA: Status: ACTIVE | Noted: 2019-05-31

## 2019-05-31 PROBLEM — R26.2 AMBULATORY DYSFUNCTION: Chronic | Status: ACTIVE | Noted: 2019-05-08

## 2019-05-31 PROBLEM — E87.6 HYPOKALEMIA: Status: ACTIVE | Noted: 2019-05-31

## 2019-05-31 PROBLEM — R74.01 TRANSAMINITIS: Chronic | Status: ACTIVE | Noted: 2019-05-09

## 2019-05-31 LAB
ALBUMIN SERPL BCP-MCNC: 3 G/DL (ref 3.5–5)
ALBUMIN SERPL BCP-MCNC: 3.6 G/DL (ref 3.5–5)
ALP SERPL-CCNC: 171 U/L (ref 46–116)
ALP SERPL-CCNC: 195 U/L (ref 46–116)
ALT SERPL W P-5'-P-CCNC: 162 U/L (ref 12–78)
ALT SERPL W P-5'-P-CCNC: 172 U/L (ref 12–78)
ANION GAP SERPL CALCULATED.3IONS-SCNC: 10 MMOL/L (ref 4–13)
ANION GAP SERPL CALCULATED.3IONS-SCNC: 7 MMOL/L (ref 4–13)
ANION GAP SERPL CALCULATED.3IONS-SCNC: 7 MMOL/L (ref 4–13)
APTT PPP: 29 SECONDS (ref 26–38)
AST SERPL W P-5'-P-CCNC: 116 U/L (ref 5–45)
AST SERPL W P-5'-P-CCNC: 155 U/L (ref 5–45)
BACTERIA UR QL AUTO: ABNORMAL /HPF
BASOPHILS # BLD AUTO: 0.01 THOUSANDS/ΜL (ref 0–0.1)
BASOPHILS # BLD AUTO: 0.02 THOUSANDS/ΜL (ref 0–0.1)
BASOPHILS NFR BLD AUTO: 0 % (ref 0–1)
BASOPHILS NFR BLD AUTO: 0 % (ref 0–1)
BILIRUB SERPL-MCNC: 0.74 MG/DL (ref 0.2–1)
BILIRUB SERPL-MCNC: 0.75 MG/DL (ref 0.2–1)
BILIRUB UR QL STRIP: ABNORMAL
BUN SERPL-MCNC: 10 MG/DL (ref 5–25)
BUN SERPL-MCNC: 6 MG/DL (ref 5–25)
BUN SERPL-MCNC: 7 MG/DL (ref 5–25)
CALCIUM SERPL-MCNC: 8.7 MG/DL (ref 8.3–10.1)
CALCIUM SERPL-MCNC: 8.8 MG/DL (ref 8.3–10.1)
CALCIUM SERPL-MCNC: 9 MG/DL (ref 8.3–10.1)
CHLORIDE SERPL-SCNC: 105 MMOL/L (ref 100–108)
CHLORIDE SERPL-SCNC: 108 MMOL/L (ref 100–108)
CHLORIDE SERPL-SCNC: 109 MMOL/L (ref 100–108)
CLARITY UR: CLEAR
CO2 SERPL-SCNC: 23 MMOL/L (ref 21–32)
CO2 SERPL-SCNC: 23 MMOL/L (ref 21–32)
CO2 SERPL-SCNC: 24 MMOL/L (ref 21–32)
COLOR UR: ABNORMAL
COLOR, POC: NORMAL
CREAT SERPL-MCNC: 0.6 MG/DL (ref 0.6–1.3)
CREAT SERPL-MCNC: 0.62 MG/DL (ref 0.6–1.3)
CREAT SERPL-MCNC: 0.82 MG/DL (ref 0.6–1.3)
EOSINOPHIL # BLD AUTO: 1.1 THOUSAND/ΜL (ref 0–0.61)
EOSINOPHIL # BLD AUTO: 1.11 THOUSAND/ΜL (ref 0–0.61)
EOSINOPHIL NFR BLD AUTO: 15 % (ref 0–6)
EOSINOPHIL NFR BLD AUTO: 19 % (ref 0–6)
ERYTHROCYTE [DISTWIDTH] IN BLOOD BY AUTOMATED COUNT: 13.7 % (ref 11.6–15.1)
ERYTHROCYTE [DISTWIDTH] IN BLOOD BY AUTOMATED COUNT: 13.8 % (ref 11.6–15.1)
ERYTHROCYTE [DISTWIDTH] IN BLOOD BY AUTOMATED COUNT: 13.9 % (ref 11.6–15.1)
GFR SERPL CREATININE-BSD FRML MDRD: 122 ML/MIN/1.73SQ M
GFR SERPL CREATININE-BSD FRML MDRD: 137 ML/MIN/1.73SQ M
GFR SERPL CREATININE-BSD FRML MDRD: 139 ML/MIN/1.73SQ M
GLUCOSE SERPL-MCNC: 101 MG/DL (ref 65–140)
GLUCOSE SERPL-MCNC: 142 MG/DL (ref 65–140)
GLUCOSE SERPL-MCNC: 88 MG/DL (ref 65–140)
GLUCOSE UR STRIP-MCNC: NEGATIVE MG/DL
HCT VFR BLD AUTO: 33.8 % (ref 36.5–49.3)
HCT VFR BLD AUTO: 34.8 % (ref 36.5–49.3)
HCT VFR BLD AUTO: 36.2 % (ref 36.5–49.3)
HGB BLD-MCNC: 10.8 G/DL (ref 12–17)
HGB BLD-MCNC: 11 G/DL (ref 12–17)
HGB BLD-MCNC: 11.5 G/DL (ref 12–17)
HGB UR QL STRIP.AUTO: ABNORMAL
HYALINE CASTS #/AREA URNS LPF: ABNORMAL /LPF
IMM GRANULOCYTES # BLD AUTO: 0.03 THOUSAND/UL (ref 0–0.2)
IMM GRANULOCYTES # BLD AUTO: 0.03 THOUSAND/UL (ref 0–0.2)
IMM GRANULOCYTES NFR BLD AUTO: 0 % (ref 0–2)
IMM GRANULOCYTES NFR BLD AUTO: 1 % (ref 0–2)
INR PPP: 1.05 (ref 0.86–1.17)
KETONES UR STRIP-MCNC: ABNORMAL MG/DL
LACTATE SERPL-SCNC: 0.6 MMOL/L (ref 0.5–2)
LEUKOCYTE ESTERASE UR QL STRIP: NEGATIVE
LYMPHOCYTES # BLD AUTO: 1.15 THOUSANDS/ΜL (ref 0.6–4.47)
LYMPHOCYTES # BLD AUTO: 1.59 THOUSANDS/ΜL (ref 0.6–4.47)
LYMPHOCYTES NFR BLD AUTO: 20 % (ref 14–44)
LYMPHOCYTES NFR BLD AUTO: 22 % (ref 14–44)
MCH RBC QN AUTO: 27.4 PG (ref 26.8–34.3)
MCH RBC QN AUTO: 27.4 PG (ref 26.8–34.3)
MCH RBC QN AUTO: 27.9 PG (ref 26.8–34.3)
MCHC RBC AUTO-ENTMCNC: 31.6 G/DL (ref 31.4–37.4)
MCHC RBC AUTO-ENTMCNC: 31.8 G/DL (ref 31.4–37.4)
MCHC RBC AUTO-ENTMCNC: 32 G/DL (ref 31.4–37.4)
MCV RBC AUTO: 86 FL (ref 82–98)
MCV RBC AUTO: 87 FL (ref 82–98)
MCV RBC AUTO: 87 FL (ref 82–98)
MONOCYTES # BLD AUTO: 0.54 THOUSAND/ΜL (ref 0.17–1.22)
MONOCYTES # BLD AUTO: 0.67 THOUSAND/ΜL (ref 0.17–1.22)
MONOCYTES NFR BLD AUTO: 9 % (ref 4–12)
MONOCYTES NFR BLD AUTO: 9 % (ref 4–12)
NEUTROPHILS # BLD AUTO: 3.02 THOUSANDS/ΜL (ref 1.85–7.62)
NEUTROPHILS # BLD AUTO: 3.86 THOUSANDS/ΜL (ref 1.85–7.62)
NEUTS SEG NFR BLD AUTO: 51 % (ref 43–75)
NEUTS SEG NFR BLD AUTO: 54 % (ref 43–75)
NITRITE UR QL STRIP: NEGATIVE
NON-SQ EPI CELLS URNS QL MICRO: ABNORMAL /HPF
NRBC BLD AUTO-RTO: 0 /100 WBCS
NRBC BLD AUTO-RTO: 0 /100 WBCS
PH UR STRIP.AUTO: 5.5 [PH] (ref 4.5–8)
PLATELET # BLD AUTO: 163 THOUSANDS/UL (ref 149–390)
PLATELET # BLD AUTO: 175 THOUSANDS/UL (ref 149–390)
PLATELET # BLD AUTO: 195 THOUSANDS/UL (ref 149–390)
PMV BLD AUTO: 8.9 FL (ref 8.9–12.7)
PMV BLD AUTO: 9.1 FL (ref 8.9–12.7)
PMV BLD AUTO: 9.2 FL (ref 8.9–12.7)
POTASSIUM SERPL-SCNC: 3.4 MMOL/L (ref 3.5–5.3)
POTASSIUM SERPL-SCNC: 3.4 MMOL/L (ref 3.5–5.3)
POTASSIUM SERPL-SCNC: 3.7 MMOL/L (ref 3.5–5.3)
PROCALCITONIN SERPL-MCNC: 0.17 NG/ML
PROT SERPL-MCNC: 6.6 G/DL (ref 6.4–8.2)
PROT SERPL-MCNC: 7.2 G/DL (ref 6.4–8.2)
PROT UR STRIP-MCNC: ABNORMAL MG/DL
PROTHROMBIN TIME: 13.8 SECONDS (ref 11.8–14.2)
RBC # BLD AUTO: 3.87 MILLION/UL (ref 3.88–5.62)
RBC # BLD AUTO: 4.02 MILLION/UL (ref 3.88–5.62)
RBC # BLD AUTO: 4.2 MILLION/UL (ref 3.88–5.62)
RBC #/AREA URNS AUTO: ABNORMAL /HPF
SODIUM SERPL-SCNC: 138 MMOL/L (ref 136–145)
SODIUM SERPL-SCNC: 139 MMOL/L (ref 136–145)
SODIUM SERPL-SCNC: 139 MMOL/L (ref 136–145)
SP GR UR STRIP.AUTO: 1.02 (ref 1–1.03)
UROBILINOGEN UR QL STRIP.AUTO: 0.2 E.U./DL
WBC # BLD AUTO: 5.7 THOUSAND/UL (ref 4.31–10.16)
WBC # BLD AUTO: 5.85 THOUSAND/UL (ref 4.31–10.16)
WBC # BLD AUTO: 7.28 THOUSAND/UL (ref 4.31–10.16)
WBC #/AREA URNS AUTO: ABNORMAL /HPF

## 2019-05-31 PROCEDURE — 81001 URINALYSIS AUTO W/SCOPE: CPT

## 2019-05-31 PROCEDURE — 05PYX3Z REMOVAL OF INFUSION DEVICE FROM UPPER VEIN, EXTERNAL APPROACH: ICD-10-PCS | Performed by: EMERGENCY MEDICINE

## 2019-05-31 PROCEDURE — 99223 1ST HOSP IP/OBS HIGH 75: CPT | Performed by: INTERNAL MEDICINE

## 2019-05-31 PROCEDURE — 87040 BLOOD CULTURE FOR BACTERIA: CPT | Performed by: EMERGENCY MEDICINE

## 2019-05-31 PROCEDURE — 85025 COMPLETE CBC W/AUTO DIFF WBC: CPT | Performed by: EMERGENCY MEDICINE

## 2019-05-31 PROCEDURE — 87070 CULTURE OTHR SPECIMN AEROBIC: CPT | Performed by: EMERGENCY MEDICINE

## 2019-05-31 PROCEDURE — 36415 COLL VENOUS BLD VENIPUNCTURE: CPT | Performed by: EMERGENCY MEDICINE

## 2019-05-31 PROCEDURE — 85730 THROMBOPLASTIN TIME PARTIAL: CPT | Performed by: EMERGENCY MEDICINE

## 2019-05-31 PROCEDURE — 80053 COMPREHEN METABOLIC PANEL: CPT | Performed by: INTERNAL MEDICINE

## 2019-05-31 PROCEDURE — 99223 1ST HOSP IP/OBS HIGH 75: CPT | Performed by: HOSPITALIST

## 2019-05-31 PROCEDURE — 83605 ASSAY OF LACTIC ACID: CPT | Performed by: EMERGENCY MEDICINE

## 2019-05-31 PROCEDURE — 96360 HYDRATION IV INFUSION INIT: CPT

## 2019-05-31 PROCEDURE — 99285 EMERGENCY DEPT VISIT HI MDM: CPT | Performed by: EMERGENCY MEDICINE

## 2019-05-31 PROCEDURE — 85610 PROTHROMBIN TIME: CPT | Performed by: EMERGENCY MEDICINE

## 2019-05-31 PROCEDURE — 71046 X-RAY EXAM CHEST 2 VIEWS: CPT

## 2019-05-31 PROCEDURE — 85025 COMPLETE CBC W/AUTO DIFF WBC: CPT | Performed by: INTERNAL MEDICINE

## 2019-05-31 PROCEDURE — 80048 BASIC METABOLIC PNL TOTAL CA: CPT | Performed by: HOSPITALIST

## 2019-05-31 PROCEDURE — 85027 COMPLETE CBC AUTOMATED: CPT | Performed by: HOSPITALIST

## 2019-05-31 PROCEDURE — 84145 PROCALCITONIN (PCT): CPT | Performed by: EMERGENCY MEDICINE

## 2019-05-31 PROCEDURE — 80053 COMPREHEN METABOLIC PANEL: CPT | Performed by: EMERGENCY MEDICINE

## 2019-05-31 RX ORDER — MORPHINE SULFATE 15 MG/1
15 TABLET ORAL EVERY 4 HOURS PRN
Status: DISCONTINUED | OUTPATIENT
Start: 2019-05-31 | End: 2019-06-03 | Stop reason: HOSPADM

## 2019-05-31 RX ORDER — MONTELUKAST SODIUM 10 MG/1
10 TABLET ORAL DAILY
Status: DISCONTINUED | OUTPATIENT
Start: 2019-05-31 | End: 2019-06-03 | Stop reason: HOSPADM

## 2019-05-31 RX ORDER — METHOCARBAMOL 750 MG/1
750 TABLET, FILM COATED ORAL EVERY 6 HOURS PRN
Status: DISCONTINUED | OUTPATIENT
Start: 2019-05-31 | End: 2019-06-01

## 2019-05-31 RX ORDER — POTASSIUM CHLORIDE 20 MEQ/1
40 TABLET, EXTENDED RELEASE ORAL ONCE
Status: COMPLETED | OUTPATIENT
Start: 2019-05-31 | End: 2019-05-31

## 2019-05-31 RX ORDER — 0.9 % SODIUM CHLORIDE 0.9 %
3 VIAL (ML) INJECTION AS NEEDED
Status: DISCONTINUED | OUTPATIENT
Start: 2019-05-31 | End: 2019-06-03 | Stop reason: HOSPADM

## 2019-05-31 RX ORDER — IBUPROFEN 400 MG/1
400 TABLET ORAL EVERY 8 HOURS PRN
Status: DISCONTINUED | OUTPATIENT
Start: 2019-05-31 | End: 2019-06-01

## 2019-05-31 RX ORDER — SODIUM CHLORIDE 9 MG/ML
100 INJECTION, SOLUTION INTRAVENOUS CONTINUOUS
Status: DISPENSED | OUTPATIENT
Start: 2019-05-31 | End: 2019-05-31

## 2019-05-31 RX ORDER — ASPIRIN 325 MG
325 TABLET ORAL DAILY
Status: DISCONTINUED | OUTPATIENT
Start: 2019-05-31 | End: 2019-06-03 | Stop reason: HOSPADM

## 2019-05-31 RX ORDER — GABAPENTIN 100 MG/1
200 CAPSULE ORAL 3 TIMES DAILY
Status: DISCONTINUED | OUTPATIENT
Start: 2019-05-31 | End: 2019-06-03 | Stop reason: HOSPADM

## 2019-05-31 RX ORDER — AMOXICILLIN 250 MG
2 CAPSULE ORAL DAILY
Status: DISCONTINUED | OUTPATIENT
Start: 2019-05-31 | End: 2019-06-03 | Stop reason: HOSPADM

## 2019-05-31 RX ORDER — PANTOPRAZOLE SODIUM 20 MG/1
20 TABLET, DELAYED RELEASE ORAL
Status: DISCONTINUED | OUTPATIENT
Start: 2019-05-31 | End: 2019-06-03 | Stop reason: HOSPADM

## 2019-05-31 RX ORDER — ONDANSETRON 2 MG/ML
4 INJECTION INTRAMUSCULAR; INTRAVENOUS EVERY 4 HOURS PRN
Status: DISCONTINUED | OUTPATIENT
Start: 2019-05-31 | End: 2019-06-03 | Stop reason: HOSPADM

## 2019-05-31 RX ORDER — DOCUSATE SODIUM 100 MG/1
100 CAPSULE, LIQUID FILLED ORAL 2 TIMES DAILY
Status: DISCONTINUED | OUTPATIENT
Start: 2019-05-31 | End: 2019-06-03 | Stop reason: HOSPADM

## 2019-05-31 RX ORDER — VANCOMYCIN HYDROCHLORIDE 1 G/200ML
15 INJECTION, SOLUTION INTRAVENOUS EVERY 8 HOURS
Status: DISCONTINUED | OUTPATIENT
Start: 2019-05-31 | End: 2019-06-01

## 2019-05-31 RX ADMIN — IBUPROFEN 400 MG: 400 TABLET ORAL at 15:27

## 2019-05-31 RX ADMIN — METHOCARBAMOL 750 MG: 750 TABLET, FILM COATED ORAL at 08:40

## 2019-05-31 RX ADMIN — GABAPENTIN 200 MG: 100 CAPSULE ORAL at 21:42

## 2019-05-31 RX ADMIN — PANTOPRAZOLE SODIUM 20 MG: 20 TABLET, DELAYED RELEASE ORAL at 05:31

## 2019-05-31 RX ADMIN — SODIUM CHLORIDE 100 ML/HR: 0.9 INJECTION, SOLUTION INTRAVENOUS at 03:06

## 2019-05-31 RX ADMIN — IBUPROFEN 400 MG: 400 TABLET ORAL at 05:18

## 2019-05-31 RX ADMIN — GABAPENTIN 200 MG: 100 CAPSULE ORAL at 15:26

## 2019-05-31 RX ADMIN — MONTELUKAST SODIUM 10 MG: 10 TABLET, FILM COATED ORAL at 08:41

## 2019-05-31 RX ADMIN — METHOCARBAMOL 750 MG: 750 TABLET, FILM COATED ORAL at 22:52

## 2019-05-31 RX ADMIN — ENOXAPARIN SODIUM 40 MG: 40 INJECTION SUBCUTANEOUS at 08:41

## 2019-05-31 RX ADMIN — GABAPENTIN 200 MG: 100 CAPSULE ORAL at 08:40

## 2019-05-31 RX ADMIN — POTASSIUM CHLORIDE 40 MEQ: 1500 TABLET, EXTENDED RELEASE ORAL at 02:59

## 2019-05-31 RX ADMIN — VANCOMYCIN HYDROCHLORIDE 1500 MG: 10 INJECTION, POWDER, LYOPHILIZED, FOR SOLUTION INTRAVENOUS at 03:31

## 2019-05-31 RX ADMIN — VANCOMYCIN HYDROCHLORIDE 1000 MG: 1 INJECTION, SOLUTION INTRAVENOUS at 21:42

## 2019-05-31 RX ADMIN — VANCOMYCIN HYDROCHLORIDE 1000 MG: 1 INJECTION, SOLUTION INTRAVENOUS at 13:34

## 2019-05-31 RX ADMIN — IBUPROFEN 400 MG: 400 TABLET ORAL at 08:38

## 2019-05-31 RX ADMIN — SODIUM CHLORIDE 1000 ML: 0.9 INJECTION, SOLUTION INTRAVENOUS at 00:43

## 2019-05-31 RX ADMIN — METHOCARBAMOL 750 MG: 750 TABLET, FILM COATED ORAL at 02:59

## 2019-05-31 RX ADMIN — IBUPROFEN 400 MG: 400 TABLET ORAL at 22:48

## 2019-05-31 RX ADMIN — ASPIRIN 325 MG ORAL TABLET 325 MG: 325 PILL ORAL at 08:40

## 2019-06-01 LAB
ALBUMIN SERPL BCP-MCNC: 3.4 G/DL (ref 3.5–5)
ALP SERPL-CCNC: 195 U/L (ref 46–116)
ALT SERPL W P-5'-P-CCNC: 213 U/L (ref 12–78)
ANION GAP SERPL CALCULATED.3IONS-SCNC: 5 MMOL/L (ref 4–13)
AST SERPL W P-5'-P-CCNC: 145 U/L (ref 5–45)
BASOPHILS # BLD AUTO: 0.02 THOUSANDS/ΜL (ref 0–0.1)
BASOPHILS NFR BLD AUTO: 0 % (ref 0–1)
BILIRUB SERPL-MCNC: 0.65 MG/DL (ref 0.2–1)
BUN SERPL-MCNC: 5 MG/DL (ref 5–25)
CALCIUM SERPL-MCNC: 9.2 MG/DL (ref 8.3–10.1)
CHLORIDE SERPL-SCNC: 105 MMOL/L (ref 100–108)
CK MB SERPL-MCNC: 6.6 NG/ML (ref 0–5)
CK MB SERPL-MCNC: <1 % (ref 0–2.5)
CK SERPL-CCNC: 3786 U/L (ref 39–308)
CO2 SERPL-SCNC: 27 MMOL/L (ref 21–32)
CREAT SERPL-MCNC: 0.57 MG/DL (ref 0.6–1.3)
EOSINOPHIL # BLD AUTO: 1.35 THOUSAND/ΜL (ref 0–0.61)
EOSINOPHIL NFR BLD AUTO: 20 % (ref 0–6)
ERYTHROCYTE [DISTWIDTH] IN BLOOD BY AUTOMATED COUNT: 13.7 % (ref 11.6–15.1)
GFR SERPL CREATININE-BSD FRML MDRD: 142 ML/MIN/1.73SQ M
GLUCOSE SERPL-MCNC: 96 MG/DL (ref 65–140)
HCT VFR BLD AUTO: 37.8 % (ref 36.5–49.3)
HGB BLD-MCNC: 12.2 G/DL (ref 12–17)
IMM GRANULOCYTES # BLD AUTO: 0.01 THOUSAND/UL (ref 0–0.2)
IMM GRANULOCYTES NFR BLD AUTO: 0 % (ref 0–2)
LYMPHOCYTES # BLD AUTO: 1.59 THOUSANDS/ΜL (ref 0.6–4.47)
LYMPHOCYTES NFR BLD AUTO: 23 % (ref 14–44)
MCH RBC QN AUTO: 27.7 PG (ref 26.8–34.3)
MCHC RBC AUTO-ENTMCNC: 32.3 G/DL (ref 31.4–37.4)
MCV RBC AUTO: 86 FL (ref 82–98)
MONOCYTES # BLD AUTO: 0.71 THOUSAND/ΜL (ref 0.17–1.22)
MONOCYTES NFR BLD AUTO: 10 % (ref 4–12)
NEUTROPHILS # BLD AUTO: 3.24 THOUSANDS/ΜL (ref 1.85–7.62)
NEUTS SEG NFR BLD AUTO: 47 % (ref 43–75)
NRBC BLD AUTO-RTO: 0 /100 WBCS
PLATELET # BLD AUTO: 176 THOUSANDS/UL (ref 149–390)
PMV BLD AUTO: 8.9 FL (ref 8.9–12.7)
POTASSIUM SERPL-SCNC: 3.4 MMOL/L (ref 3.5–5.3)
PROT SERPL-MCNC: 7.3 G/DL (ref 6.4–8.2)
RBC # BLD AUTO: 4.41 MILLION/UL (ref 3.88–5.62)
SODIUM SERPL-SCNC: 137 MMOL/L (ref 136–145)
VANCOMYCIN TROUGH SERPL-MCNC: 12 UG/ML (ref 10–20)
WBC # BLD AUTO: 6.92 THOUSAND/UL (ref 4.31–10.16)

## 2019-06-01 PROCEDURE — 82550 ASSAY OF CK (CPK): CPT | Performed by: INTERNAL MEDICINE

## 2019-06-01 PROCEDURE — 99232 SBSQ HOSP IP/OBS MODERATE 35: CPT | Performed by: INTERNAL MEDICINE

## 2019-06-01 PROCEDURE — 99232 SBSQ HOSP IP/OBS MODERATE 35: CPT | Performed by: NURSE PRACTITIONER

## 2019-06-01 PROCEDURE — 85025 COMPLETE CBC W/AUTO DIFF WBC: CPT | Performed by: NURSE PRACTITIONER

## 2019-06-01 PROCEDURE — 80202 ASSAY OF VANCOMYCIN: CPT | Performed by: HOSPITALIST

## 2019-06-01 PROCEDURE — 82553 CREATINE MB FRACTION: CPT | Performed by: INTERNAL MEDICINE

## 2019-06-01 PROCEDURE — 80053 COMPREHEN METABOLIC PANEL: CPT | Performed by: NURSE PRACTITIONER

## 2019-06-01 RX ORDER — METHOCARBAMOL 750 MG/1
750 TABLET, FILM COATED ORAL EVERY 6 HOURS SCHEDULED
Status: DISCONTINUED | OUTPATIENT
Start: 2019-06-01 | End: 2019-06-03 | Stop reason: HOSPADM

## 2019-06-01 RX ORDER — IBUPROFEN 400 MG/1
400 TABLET ORAL EVERY 8 HOURS SCHEDULED
Status: DISCONTINUED | OUTPATIENT
Start: 2019-06-01 | End: 2019-06-03 | Stop reason: HOSPADM

## 2019-06-01 RX ADMIN — METHOCARBAMOL 750 MG: 750 TABLET, FILM COATED ORAL at 23:37

## 2019-06-01 RX ADMIN — METHOCARBAMOL 750 MG: 750 TABLET, FILM COATED ORAL at 05:03

## 2019-06-01 RX ADMIN — MONTELUKAST SODIUM 10 MG: 10 TABLET, FILM COATED ORAL at 08:09

## 2019-06-01 RX ADMIN — METHOCARBAMOL 750 MG: 750 TABLET, FILM COATED ORAL at 17:50

## 2019-06-01 RX ADMIN — GABAPENTIN 200 MG: 100 CAPSULE ORAL at 22:03

## 2019-06-01 RX ADMIN — DAPTOMYCIN 425 MG: 500 INJECTION, POWDER, LYOPHILIZED, FOR SOLUTION INTRAVENOUS at 13:54

## 2019-06-01 RX ADMIN — ASPIRIN 325 MG ORAL TABLET 325 MG: 325 PILL ORAL at 08:09

## 2019-06-01 RX ADMIN — IBUPROFEN 400 MG: 400 TABLET ORAL at 13:54

## 2019-06-01 RX ADMIN — DOCUSATE SODIUM 100 MG: 100 CAPSULE, LIQUID FILLED ORAL at 17:50

## 2019-06-01 RX ADMIN — GABAPENTIN 200 MG: 100 CAPSULE ORAL at 17:50

## 2019-06-01 RX ADMIN — GABAPENTIN 200 MG: 100 CAPSULE ORAL at 08:09

## 2019-06-01 RX ADMIN — IBUPROFEN 400 MG: 400 TABLET ORAL at 08:09

## 2019-06-01 RX ADMIN — ENOXAPARIN SODIUM 40 MG: 40 INJECTION SUBCUTANEOUS at 08:09

## 2019-06-01 RX ADMIN — PANTOPRAZOLE SODIUM 20 MG: 20 TABLET, DELAYED RELEASE ORAL at 05:03

## 2019-06-01 RX ADMIN — DOCUSATE SODIUM 100 MG: 100 CAPSULE, LIQUID FILLED ORAL at 08:09

## 2019-06-01 RX ADMIN — IBUPROFEN 400 MG: 400 TABLET ORAL at 22:03

## 2019-06-01 RX ADMIN — VANCOMYCIN HYDROCHLORIDE 1000 MG: 1 INJECTION, SOLUTION INTRAVENOUS at 05:03

## 2019-06-02 PROBLEM — E87.6 HYPOKALEMIA: Status: RESOLVED | Noted: 2019-05-31 | Resolved: 2019-06-02

## 2019-06-02 LAB
ALBUMIN SERPL BCP-MCNC: 3.4 G/DL (ref 3.5–5)
ALP SERPL-CCNC: 191 U/L (ref 46–116)
ALT SERPL W P-5'-P-CCNC: 200 U/L (ref 12–78)
ANION GAP SERPL CALCULATED.3IONS-SCNC: 5 MMOL/L (ref 4–13)
AST SERPL W P-5'-P-CCNC: 100 U/L (ref 5–45)
BASOPHILS # BLD AUTO: 0.04 THOUSANDS/ΜL (ref 0–0.1)
BASOPHILS NFR BLD AUTO: 1 % (ref 0–1)
BILIRUB SERPL-MCNC: 0.55 MG/DL (ref 0.2–1)
BUN SERPL-MCNC: 9 MG/DL (ref 5–25)
CALCIUM SERPL-MCNC: 9.2 MG/DL (ref 8.3–10.1)
CHLORIDE SERPL-SCNC: 107 MMOL/L (ref 100–108)
CK MB SERPL-MCNC: 4.1 NG/ML (ref 0–5)
CK MB SERPL-MCNC: <1 % (ref 0–2.5)
CK SERPL-CCNC: 3002 U/L (ref 39–308)
CO2 SERPL-SCNC: 27 MMOL/L (ref 21–32)
CREAT SERPL-MCNC: 0.65 MG/DL (ref 0.6–1.3)
EOSINOPHIL # BLD AUTO: 1.43 THOUSAND/ΜL (ref 0–0.61)
EOSINOPHIL NFR BLD AUTO: 22 % (ref 0–6)
ERYTHROCYTE [DISTWIDTH] IN BLOOD BY AUTOMATED COUNT: 13.8 % (ref 11.6–15.1)
GFR SERPL CREATININE-BSD FRML MDRD: 134 ML/MIN/1.73SQ M
GLUCOSE SERPL-MCNC: 101 MG/DL (ref 65–140)
HCT VFR BLD AUTO: 36.7 % (ref 36.5–49.3)
HGB BLD-MCNC: 11.8 G/DL (ref 12–17)
IMM GRANULOCYTES # BLD AUTO: 0.02 THOUSAND/UL (ref 0–0.2)
IMM GRANULOCYTES NFR BLD AUTO: 0 % (ref 0–2)
LYMPHOCYTES # BLD AUTO: 2.11 THOUSANDS/ΜL (ref 0.6–4.47)
LYMPHOCYTES NFR BLD AUTO: 33 % (ref 14–44)
MCH RBC QN AUTO: 27.9 PG (ref 26.8–34.3)
MCHC RBC AUTO-ENTMCNC: 32.2 G/DL (ref 31.4–37.4)
MCV RBC AUTO: 87 FL (ref 82–98)
MONOCYTES # BLD AUTO: 0.77 THOUSAND/ΜL (ref 0.17–1.22)
MONOCYTES NFR BLD AUTO: 12 % (ref 4–12)
NEUTROPHILS # BLD AUTO: 2.1 THOUSANDS/ΜL (ref 1.85–7.62)
NEUTS SEG NFR BLD AUTO: 32 % (ref 43–75)
NRBC BLD AUTO-RTO: 0 /100 WBCS
PLATELET # BLD AUTO: 192 THOUSANDS/UL (ref 149–390)
PMV BLD AUTO: 9.5 FL (ref 8.9–12.7)
POTASSIUM SERPL-SCNC: 4 MMOL/L (ref 3.5–5.3)
PROT SERPL-MCNC: 7.2 G/DL (ref 6.4–8.2)
RBC # BLD AUTO: 4.23 MILLION/UL (ref 3.88–5.62)
SODIUM SERPL-SCNC: 139 MMOL/L (ref 136–145)
WBC # BLD AUTO: 6.47 THOUSAND/UL (ref 4.31–10.16)

## 2019-06-02 PROCEDURE — 85025 COMPLETE CBC W/AUTO DIFF WBC: CPT | Performed by: INTERNAL MEDICINE

## 2019-06-02 PROCEDURE — 80053 COMPREHEN METABOLIC PANEL: CPT | Performed by: INTERNAL MEDICINE

## 2019-06-02 PROCEDURE — 05HB33Z INSERTION OF INFUSION DEVICE INTO RIGHT BASILIC VEIN, PERCUTANEOUS APPROACH: ICD-10-PCS | Performed by: INTERNAL MEDICINE

## 2019-06-02 PROCEDURE — 99232 SBSQ HOSP IP/OBS MODERATE 35: CPT | Performed by: INTERNAL MEDICINE

## 2019-06-02 PROCEDURE — 82550 ASSAY OF CK (CPK): CPT | Performed by: NURSE PRACTITIONER

## 2019-06-02 PROCEDURE — 82553 CREATINE MB FRACTION: CPT | Performed by: NURSE PRACTITIONER

## 2019-06-02 PROCEDURE — 99232 SBSQ HOSP IP/OBS MODERATE 35: CPT | Performed by: NURSE PRACTITIONER

## 2019-06-02 RX ADMIN — METHOCARBAMOL 750 MG: 750 TABLET, FILM COATED ORAL at 23:35

## 2019-06-02 RX ADMIN — MONTELUKAST SODIUM 10 MG: 10 TABLET, FILM COATED ORAL at 09:31

## 2019-06-02 RX ADMIN — DOCUSATE SODIUM 100 MG: 100 CAPSULE, LIQUID FILLED ORAL at 09:31

## 2019-06-02 RX ADMIN — PANTOPRAZOLE SODIUM 20 MG: 20 TABLET, DELAYED RELEASE ORAL at 06:04

## 2019-06-02 RX ADMIN — METHOCARBAMOL 750 MG: 750 TABLET, FILM COATED ORAL at 12:04

## 2019-06-02 RX ADMIN — DOCUSATE SODIUM 100 MG: 100 CAPSULE, LIQUID FILLED ORAL at 17:17

## 2019-06-02 RX ADMIN — GABAPENTIN 200 MG: 100 CAPSULE ORAL at 17:17

## 2019-06-02 RX ADMIN — CEFTAROLINE FOSAMIL 600 MG: 600 POWDER, FOR SOLUTION INTRAVENOUS at 12:04

## 2019-06-02 RX ADMIN — METHOCARBAMOL 750 MG: 750 TABLET, FILM COATED ORAL at 06:04

## 2019-06-02 RX ADMIN — SENNOSIDES AND DOCUSATE SODIUM 2 TABLET: 8.6; 5 TABLET ORAL at 09:32

## 2019-06-02 RX ADMIN — ENOXAPARIN SODIUM 40 MG: 40 INJECTION SUBCUTANEOUS at 09:29

## 2019-06-02 RX ADMIN — CEFTAROLINE FOSAMIL 600 MG: 600 POWDER, FOR SOLUTION INTRAVENOUS at 19:12

## 2019-06-02 RX ADMIN — ASPIRIN 325 MG ORAL TABLET 325 MG: 325 PILL ORAL at 09:32

## 2019-06-02 RX ADMIN — METHOCARBAMOL 750 MG: 750 TABLET, FILM COATED ORAL at 17:17

## 2019-06-02 RX ADMIN — GABAPENTIN 200 MG: 100 CAPSULE ORAL at 21:28

## 2019-06-02 RX ADMIN — GABAPENTIN 200 MG: 100 CAPSULE ORAL at 09:31

## 2019-06-02 RX ADMIN — IBUPROFEN 400 MG: 400 TABLET ORAL at 14:23

## 2019-06-02 RX ADMIN — IBUPROFEN 400 MG: 400 TABLET ORAL at 06:04

## 2019-06-02 RX ADMIN — IBUPROFEN 400 MG: 400 TABLET ORAL at 21:28

## 2019-06-03 VITALS
HEART RATE: 69 BPM | BODY MASS INDEX: 25.82 KG/M2 | SYSTOLIC BLOOD PRESSURE: 108 MMHG | RESPIRATION RATE: 18 BRPM | TEMPERATURE: 98.4 F | OXYGEN SATURATION: 100 % | WEIGHT: 154.98 LBS | DIASTOLIC BLOOD PRESSURE: 61 MMHG | HEIGHT: 65 IN

## 2019-06-03 PROBLEM — R74.8 ELEVATED CPK: Status: ACTIVE | Noted: 2019-06-03

## 2019-06-03 PROBLEM — R00.0 SINUS TACHYCARDIA: Status: RESOLVED | Noted: 2019-05-31 | Resolved: 2019-06-03

## 2019-06-03 LAB
ALBUMIN SERPL BCP-MCNC: 3.3 G/DL (ref 3.5–5)
ALP SERPL-CCNC: 191 U/L (ref 46–116)
ALT SERPL W P-5'-P-CCNC: 157 U/L (ref 12–78)
ANION GAP SERPL CALCULATED.3IONS-SCNC: 8 MMOL/L (ref 4–13)
AST SERPL W P-5'-P-CCNC: 70 U/L (ref 5–45)
BACTERIA CATH TIP CULT: NO GROWTH
BACTERIA CATH TIP CULT: NORMAL
BASOPHILS # BLD AUTO: 0.06 THOUSANDS/ΜL (ref 0–0.1)
BASOPHILS NFR BLD AUTO: 1 % (ref 0–1)
BILIRUB SERPL-MCNC: 0.43 MG/DL (ref 0.2–1)
BUN SERPL-MCNC: 10 MG/DL (ref 5–25)
CALCIUM SERPL-MCNC: 9.4 MG/DL (ref 8.3–10.1)
CHLORIDE SERPL-SCNC: 108 MMOL/L (ref 100–108)
CK MB SERPL-MCNC: 3.5 NG/ML (ref 0–5)
CK MB SERPL-MCNC: <1 % (ref 0–2.5)
CK SERPL-CCNC: 2311 U/L (ref 39–308)
CO2 SERPL-SCNC: 26 MMOL/L (ref 21–32)
CREAT SERPL-MCNC: 0.61 MG/DL (ref 0.6–1.3)
EOSINOPHIL # BLD AUTO: 1.15 THOUSAND/ΜL (ref 0–0.61)
EOSINOPHIL NFR BLD AUTO: 18 % (ref 0–6)
ERYTHROCYTE [DISTWIDTH] IN BLOOD BY AUTOMATED COUNT: 13.8 % (ref 11.6–15.1)
GFR SERPL CREATININE-BSD FRML MDRD: 138 ML/MIN/1.73SQ M
GLUCOSE SERPL-MCNC: 98 MG/DL (ref 65–140)
HCT VFR BLD AUTO: 37.8 % (ref 36.5–49.3)
HGB BLD-MCNC: 11.8 G/DL (ref 12–17)
IMM GRANULOCYTES # BLD AUTO: 0.03 THOUSAND/UL (ref 0–0.2)
IMM GRANULOCYTES NFR BLD AUTO: 1 % (ref 0–2)
LYMPHOCYTES # BLD AUTO: 2.48 THOUSANDS/ΜL (ref 0.6–4.47)
LYMPHOCYTES NFR BLD AUTO: 39 % (ref 14–44)
MCH RBC QN AUTO: 27.4 PG (ref 26.8–34.3)
MCHC RBC AUTO-ENTMCNC: 31.2 G/DL (ref 31.4–37.4)
MCV RBC AUTO: 88 FL (ref 82–98)
MONOCYTES # BLD AUTO: 0.7 THOUSAND/ΜL (ref 0.17–1.22)
MONOCYTES NFR BLD AUTO: 11 % (ref 4–12)
NEUTROPHILS # BLD AUTO: 1.89 THOUSANDS/ΜL (ref 1.85–7.62)
NEUTS SEG NFR BLD AUTO: 30 % (ref 43–75)
NRBC BLD AUTO-RTO: 0 /100 WBCS
PLATELET # BLD AUTO: 221 THOUSANDS/UL (ref 149–390)
PMV BLD AUTO: 9.4 FL (ref 8.9–12.7)
POTASSIUM SERPL-SCNC: 4.2 MMOL/L (ref 3.5–5.3)
PROT SERPL-MCNC: 7.1 G/DL (ref 6.4–8.2)
RBC # BLD AUTO: 4.31 MILLION/UL (ref 3.88–5.62)
SODIUM SERPL-SCNC: 142 MMOL/L (ref 136–145)
WBC # BLD AUTO: 6.31 THOUSAND/UL (ref 4.31–10.16)

## 2019-06-03 PROCEDURE — 80053 COMPREHEN METABOLIC PANEL: CPT | Performed by: INTERNAL MEDICINE

## 2019-06-03 PROCEDURE — 36569 INSJ PICC 5 YR+ W/O IMAGING: CPT

## 2019-06-03 PROCEDURE — C1751 CATH, INF, PER/CENT/MIDLINE: HCPCS

## 2019-06-03 PROCEDURE — 85025 COMPLETE CBC W/AUTO DIFF WBC: CPT | Performed by: INTERNAL MEDICINE

## 2019-06-03 PROCEDURE — 82550 ASSAY OF CK (CPK): CPT | Performed by: INTERNAL MEDICINE

## 2019-06-03 PROCEDURE — 82553 CREATINE MB FRACTION: CPT | Performed by: INTERNAL MEDICINE

## 2019-06-03 PROCEDURE — 99232 SBSQ HOSP IP/OBS MODERATE 35: CPT | Performed by: INTERNAL MEDICINE

## 2019-06-03 RX ADMIN — GABAPENTIN 200 MG: 100 CAPSULE ORAL at 09:26

## 2019-06-03 RX ADMIN — IBUPROFEN 400 MG: 400 TABLET ORAL at 06:21

## 2019-06-03 RX ADMIN — METHOCARBAMOL 750 MG: 750 TABLET, FILM COATED ORAL at 11:08

## 2019-06-03 RX ADMIN — CEFTAROLINE FOSAMIL 600 MG: 600 POWDER, FOR SOLUTION INTRAVENOUS at 03:04

## 2019-06-03 RX ADMIN — PANTOPRAZOLE SODIUM 20 MG: 20 TABLET, DELAYED RELEASE ORAL at 06:21

## 2019-06-03 RX ADMIN — DOCUSATE SODIUM 100 MG: 100 CAPSULE, LIQUID FILLED ORAL at 09:26

## 2019-06-03 RX ADMIN — ASPIRIN 325 MG ORAL TABLET 325 MG: 325 PILL ORAL at 09:26

## 2019-06-03 RX ADMIN — ENOXAPARIN SODIUM 40 MG: 40 INJECTION SUBCUTANEOUS at 09:26

## 2019-06-03 RX ADMIN — SENNOSIDES AND DOCUSATE SODIUM 2 TABLET: 8.6; 5 TABLET ORAL at 09:26

## 2019-06-03 RX ADMIN — CEFTAROLINE FOSAMIL 600 MG: 600 POWDER, FOR SOLUTION INTRAVENOUS at 11:08

## 2019-06-03 RX ADMIN — MONTELUKAST SODIUM 10 MG: 10 TABLET, FILM COATED ORAL at 09:26

## 2019-06-03 RX ADMIN — METHOCARBAMOL 750 MG: 750 TABLET, FILM COATED ORAL at 06:21

## 2019-06-05 LAB
BACTERIA BLD CULT: NORMAL
BACTERIA BLD CULT: NORMAL

## 2019-06-06 ENCOUNTER — TELEPHONE (OUTPATIENT)
Dept: OBGYN CLINIC | Facility: HOSPITAL | Age: 29
End: 2019-06-06

## 2019-06-06 DIAGNOSIS — M00.062 STAPHYLOCOCCAL ARTHRITIS OF LEFT KNEE (HCC): Primary | ICD-10-CM

## 2019-06-06 DIAGNOSIS — M00.062 STAPHYLOCOCCAL ARTHRITIS OF LEFT KNEE (HCC): ICD-10-CM

## 2019-06-06 RX ORDER — GABAPENTIN 100 MG/1
200 CAPSULE ORAL 3 TIMES DAILY
Qty: 90 CAPSULE | Refills: 0 | Status: SHIPPED | OUTPATIENT
Start: 2019-06-06 | End: 2019-06-27 | Stop reason: SDUPTHER

## 2019-06-06 RX ORDER — GABAPENTIN 100 MG/1
200 CAPSULE ORAL 3 TIMES DAILY
Qty: 90 CAPSULE | Refills: 0 | Status: SHIPPED | OUTPATIENT
Start: 2019-06-06 | End: 2019-06-06 | Stop reason: SDUPTHER

## 2019-06-07 ENCOUNTER — LAB REQUISITION (OUTPATIENT)
Dept: LAB | Facility: HOSPITAL | Age: 29
End: 2019-06-07
Payer: COMMERCIAL

## 2019-06-07 DIAGNOSIS — B95.62 METHICILLIN RESISTANT STAPHYLOCOCCUS AUREUS INFECTION AS THE CAUSE OF DISEASES CLASSIFIED ELSEWHERE: ICD-10-CM

## 2019-06-07 LAB
ALBUMIN SERPL BCP-MCNC: 4.2 G/DL (ref 3.5–5)
ALP SERPL-CCNC: 193 U/L (ref 46–116)
ALT SERPL W P-5'-P-CCNC: 133 U/L (ref 12–78)
ANION GAP SERPL CALCULATED.3IONS-SCNC: 7 MMOL/L (ref 4–13)
ANISOCYTOSIS BLD QL SMEAR: PRESENT
AST SERPL W P-5'-P-CCNC: 40 U/L (ref 5–45)
BASOPHILS # BLD MANUAL: 0 THOUSAND/UL (ref 0–0.1)
BASOPHILS NFR MAR MANUAL: 0 % (ref 0–1)
BILIRUB SERPL-MCNC: 0.57 MG/DL (ref 0.2–1)
BUN SERPL-MCNC: 14 MG/DL (ref 5–25)
CALCIUM SERPL-MCNC: 10 MG/DL (ref 8.3–10.1)
CHLORIDE SERPL-SCNC: 104 MMOL/L (ref 100–108)
CK MB SERPL-MCNC: 1 % (ref 0–2.5)
CK MB SERPL-MCNC: 4.5 NG/ML (ref 0–5)
CK SERPL-CCNC: 457 U/L (ref 39–308)
CO2 SERPL-SCNC: 28 MMOL/L (ref 21–32)
CREAT SERPL-MCNC: 0.66 MG/DL (ref 0.6–1.3)
EOSINOPHIL # BLD MANUAL: 0.71 THOUSAND/UL (ref 0–0.4)
EOSINOPHIL NFR BLD MANUAL: 8 % (ref 0–6)
ERYTHROCYTE [DISTWIDTH] IN BLOOD BY AUTOMATED COUNT: 13.7 % (ref 11.6–15.1)
GFR SERPL CREATININE-BSD FRML MDRD: 133 ML/MIN/1.73SQ M
GLUCOSE SERPL-MCNC: 93 MG/DL (ref 65–140)
HCT VFR BLD AUTO: 40.1 % (ref 36.5–49.3)
HGB BLD-MCNC: 13.1 G/DL (ref 12–17)
LYMPHOCYTES # BLD AUTO: 2.21 THOUSAND/UL (ref 0.6–4.47)
LYMPHOCYTES # BLD AUTO: 25 % (ref 14–44)
MCH RBC QN AUTO: 28.1 PG (ref 26.8–34.3)
MCHC RBC AUTO-ENTMCNC: 32.7 G/DL (ref 31.4–37.4)
MCV RBC AUTO: 86 FL (ref 82–98)
MONOCYTES # BLD AUTO: 0.53 THOUSAND/UL (ref 0–1.22)
MONOCYTES NFR BLD: 6 % (ref 4–12)
NEUTROPHILS # BLD MANUAL: 4.42 THOUSAND/UL (ref 1.85–7.62)
NEUTS SEG NFR BLD AUTO: 50 % (ref 43–75)
NRBC BLD AUTO-RTO: 0 /100 WBCS
PLATELET # BLD AUTO: 358 THOUSANDS/UL (ref 149–390)
PLATELET BLD QL SMEAR: ADEQUATE
PMV BLD AUTO: 9.3 FL (ref 8.9–12.7)
POTASSIUM SERPL-SCNC: 4.3 MMOL/L (ref 3.5–5.3)
PROT SERPL-MCNC: 7.7 G/DL (ref 6.4–8.2)
RBC # BLD AUTO: 4.66 MILLION/UL (ref 3.88–5.62)
RBC MORPH BLD: PRESENT
SODIUM SERPL-SCNC: 139 MMOL/L (ref 136–145)
VARIANT LYMPHS # BLD AUTO: 11 %
WBC # BLD AUTO: 8.83 THOUSAND/UL (ref 4.31–10.16)

## 2019-06-07 PROCEDURE — 82553 CREATINE MB FRACTION: CPT | Performed by: INTERNAL MEDICINE

## 2019-06-07 PROCEDURE — 82550 ASSAY OF CK (CPK): CPT | Performed by: INTERNAL MEDICINE

## 2019-06-07 PROCEDURE — 85027 COMPLETE CBC AUTOMATED: CPT | Performed by: INTERNAL MEDICINE

## 2019-06-07 PROCEDURE — 80053 COMPREHEN METABOLIC PANEL: CPT | Performed by: INTERNAL MEDICINE

## 2019-06-07 PROCEDURE — 85007 BL SMEAR W/DIFF WBC COUNT: CPT | Performed by: INTERNAL MEDICINE

## 2019-06-10 ENCOUNTER — TELEPHONE (OUTPATIENT)
Dept: OBGYN CLINIC | Facility: HOSPITAL | Age: 29
End: 2019-06-10

## 2019-06-10 DIAGNOSIS — Z98.890 STATUS POST SURGERY: ICD-10-CM

## 2019-06-10 LAB
FUNGUS SPEC CULT: NORMAL

## 2019-06-10 RX ORDER — IBUPROFEN 800 MG/1
800 TABLET ORAL EVERY 8 HOURS PRN
Qty: 30 TABLET | Refills: 0 | Status: SHIPPED | OUTPATIENT
Start: 2019-06-10 | End: 2019-07-22

## 2019-06-17 ENCOUNTER — DOCUMENTATION (OUTPATIENT)
Dept: INFECTIOUS DISEASES | Facility: CLINIC | Age: 29
End: 2019-06-17

## 2019-06-17 ENCOUNTER — HOSPITAL ENCOUNTER (INPATIENT)
Facility: HOSPITAL | Age: 29
LOS: 3 days | Discharge: HOME | DRG: 948 | End: 2019-06-20
Attending: EMERGENCY MEDICINE | Admitting: INTERNAL MEDICINE
Payer: COMMERCIAL

## 2019-06-17 DIAGNOSIS — R74.8 ELEVATED CREATINE KINASE LEVEL: Primary | ICD-10-CM

## 2019-06-17 DIAGNOSIS — M00.062 STAPHYLOCOCCAL ARTHRITIS OF LEFT KNEE (HCC): ICD-10-CM

## 2019-06-17 DIAGNOSIS — M00.9 SEPTIC JOINT OF LEFT KNEE JOINT (HCC): ICD-10-CM

## 2019-06-17 LAB
ALBUMIN SERPL BCP-MCNC: 3.9 G/DL (ref 3.5–5)
ALP SERPL-CCNC: 150 U/L (ref 46–116)
ALT SERPL W P-5'-P-CCNC: 115 U/L (ref 12–78)
AMPHETAMINES SERPL QL SCN: NEGATIVE
ANION GAP SERPL CALCULATED.3IONS-SCNC: 3 MMOL/L (ref 4–13)
AST SERPL W P-5'-P-CCNC: 127 U/L (ref 5–45)
BARBITURATES UR QL: NEGATIVE
BASOPHILS # BLD AUTO: 0.07 THOUSANDS/ΜL (ref 0–0.1)
BASOPHILS NFR BLD AUTO: 1 % (ref 0–1)
BENZODIAZ UR QL: NEGATIVE
BILIRUB SERPL-MCNC: 0.62 MG/DL (ref 0.2–1)
BILIRUB UR QL STRIP: NEGATIVE
BUN SERPL-MCNC: 8 MG/DL (ref 5–25)
CALCIUM SERPL-MCNC: 9.8 MG/DL (ref 8.3–10.1)
CHLORIDE SERPL-SCNC: 105 MMOL/L (ref 100–108)
CK MB SERPL-MCNC: 10.1 NG/ML (ref 0–5)
CK MB SERPL-MCNC: <1 % (ref 0–2.5)
CK SERPL-CCNC: 8213 U/L (ref 39–308)
CLARITY UR: CLEAR
CO2 SERPL-SCNC: 28 MMOL/L (ref 21–32)
COCAINE UR QL: NEGATIVE
COLOR UR: YELLOW
COLOR, POC: NORMAL
CREAT SERPL-MCNC: 0.7 MG/DL (ref 0.6–1.3)
EOSINOPHIL # BLD AUTO: 0.55 THOUSAND/ΜL (ref 0–0.61)
EOSINOPHIL NFR BLD AUTO: 10 % (ref 0–6)
ERYTHROCYTE [DISTWIDTH] IN BLOOD BY AUTOMATED COUNT: 13.2 % (ref 11.6–15.1)
GFR SERPL CREATININE-BSD FRML MDRD: 130 ML/MIN/1.73SQ M
GLUCOSE SERPL-MCNC: 96 MG/DL (ref 65–140)
GLUCOSE UR STRIP-MCNC: NEGATIVE MG/DL
HCT VFR BLD AUTO: 39.7 % (ref 36.5–49.3)
HGB BLD-MCNC: 13.1 G/DL (ref 12–17)
HGB UR QL STRIP.AUTO: NEGATIVE
IMM GRANULOCYTES # BLD AUTO: 0.01 THOUSAND/UL (ref 0–0.2)
IMM GRANULOCYTES NFR BLD AUTO: 0 % (ref 0–2)
KETONES UR STRIP-MCNC: NEGATIVE MG/DL
LEUKOCYTE ESTERASE UR QL STRIP: NEGATIVE
LYMPHOCYTES # BLD AUTO: 1.84 THOUSANDS/ΜL (ref 0.6–4.47)
LYMPHOCYTES NFR BLD AUTO: 34 % (ref 14–44)
MCH RBC QN AUTO: 27.9 PG (ref 26.8–34.3)
MCHC RBC AUTO-ENTMCNC: 33 G/DL (ref 31.4–37.4)
MCV RBC AUTO: 85 FL (ref 82–98)
METHADONE UR QL: NEGATIVE
MONOCYTES # BLD AUTO: 0.59 THOUSAND/ΜL (ref 0.17–1.22)
MONOCYTES NFR BLD AUTO: 11 % (ref 4–12)
NEUTROPHILS # BLD AUTO: 2.43 THOUSANDS/ΜL (ref 1.85–7.62)
NEUTS SEG NFR BLD AUTO: 44 % (ref 43–75)
NITRITE UR QL STRIP: NEGATIVE
NRBC BLD AUTO-RTO: 0 /100 WBCS
OPIATES UR QL SCN: NEGATIVE
PCP UR QL: NEGATIVE
PH UR STRIP.AUTO: 8.5 [PH] (ref 4.5–8)
PLATELET # BLD AUTO: 234 THOUSANDS/UL (ref 149–390)
PMV BLD AUTO: 9.1 FL (ref 8.9–12.7)
POTASSIUM SERPL-SCNC: 3.7 MMOL/L (ref 3.5–5.3)
PROT SERPL-MCNC: 7.7 G/DL (ref 6.4–8.2)
PROT UR STRIP-MCNC: NEGATIVE MG/DL
RBC # BLD AUTO: 4.7 MILLION/UL (ref 3.88–5.62)
SODIUM SERPL-SCNC: 136 MMOL/L (ref 136–145)
SP GR UR STRIP.AUTO: 1.02 (ref 1–1.03)
THC UR QL: NEGATIVE
UROBILINOGEN UR QL STRIP.AUTO: 0.2 E.U./DL
WBC # BLD AUTO: 5.49 THOUSAND/UL (ref 4.31–10.16)

## 2019-06-17 PROCEDURE — 85025 COMPLETE CBC W/AUTO DIFF WBC: CPT | Performed by: EMERGENCY MEDICINE

## 2019-06-17 PROCEDURE — 99284 EMERGENCY DEPT VISIT MOD MDM: CPT

## 2019-06-17 PROCEDURE — 80307 DRUG TEST PRSMV CHEM ANLYZR: CPT | Performed by: INTERNAL MEDICINE

## 2019-06-17 PROCEDURE — 99285 EMERGENCY DEPT VISIT HI MDM: CPT | Performed by: EMERGENCY MEDICINE

## 2019-06-17 PROCEDURE — 81003 URINALYSIS AUTO W/O SCOPE: CPT

## 2019-06-17 PROCEDURE — 80074 ACUTE HEPATITIS PANEL: CPT | Performed by: PHYSICIAN ASSISTANT

## 2019-06-17 PROCEDURE — 99223 1ST HOSP IP/OBS HIGH 75: CPT | Performed by: INTERNAL MEDICINE

## 2019-06-17 PROCEDURE — 82553 CREATINE MB FRACTION: CPT | Performed by: EMERGENCY MEDICINE

## 2019-06-17 PROCEDURE — 80053 COMPREHEN METABOLIC PANEL: CPT | Performed by: EMERGENCY MEDICINE

## 2019-06-17 PROCEDURE — 82550 ASSAY OF CK (CPK): CPT | Performed by: EMERGENCY MEDICINE

## 2019-06-17 PROCEDURE — 96360 HYDRATION IV INFUSION INIT: CPT

## 2019-06-17 PROCEDURE — 36415 COLL VENOUS BLD VENIPUNCTURE: CPT | Performed by: EMERGENCY MEDICINE

## 2019-06-17 RX ORDER — POLYETHYLENE GLYCOL 3350 17 G/17G
17 POWDER, FOR SOLUTION ORAL DAILY
Status: DISCONTINUED | OUTPATIENT
Start: 2019-06-18 | End: 2019-06-20 | Stop reason: HOSPADM

## 2019-06-17 RX ORDER — GABAPENTIN 100 MG/1
200 CAPSULE ORAL 3 TIMES DAILY
Status: DISCONTINUED | OUTPATIENT
Start: 2019-06-17 | End: 2019-06-20 | Stop reason: HOSPADM

## 2019-06-17 RX ORDER — METHOCARBAMOL 750 MG/1
750 TABLET, FILM COATED ORAL EVERY 6 HOURS PRN
Status: DISCONTINUED | OUTPATIENT
Start: 2019-06-17 | End: 2019-06-20 | Stop reason: HOSPADM

## 2019-06-17 RX ORDER — DOCUSATE SODIUM 100 MG/1
100 CAPSULE, LIQUID FILLED ORAL EVERY 12 HOURS
Status: DISCONTINUED | OUTPATIENT
Start: 2019-06-17 | End: 2019-06-20 | Stop reason: HOSPADM

## 2019-06-17 RX ORDER — SODIUM CHLORIDE 9 MG/ML
125 INJECTION, SOLUTION INTRAVENOUS CONTINUOUS
Status: DISCONTINUED | OUTPATIENT
Start: 2019-06-17 | End: 2019-06-20 | Stop reason: HOSPADM

## 2019-06-17 RX ORDER — IBUPROFEN 400 MG/1
800 TABLET ORAL EVERY 8 HOURS PRN
Status: CANCELLED | OUTPATIENT
Start: 2019-06-17

## 2019-06-17 RX ORDER — MONTELUKAST SODIUM 10 MG/1
10 TABLET ORAL DAILY
Status: DISCONTINUED | OUTPATIENT
Start: 2019-06-18 | End: 2019-06-20 | Stop reason: HOSPADM

## 2019-06-17 RX ADMIN — SODIUM CHLORIDE 1000 ML: 0.9 INJECTION, SOLUTION INTRAVENOUS at 16:00

## 2019-06-17 RX ADMIN — GABAPENTIN 200 MG: 100 CAPSULE ORAL at 20:13

## 2019-06-17 RX ADMIN — CEFTAROLINE FOSAMIL 600 MG: 600 POWDER, FOR SOLUTION INTRAVENOUS at 20:14

## 2019-06-17 RX ADMIN — SODIUM CHLORIDE 125 ML/HR: 0.9 INJECTION, SOLUTION INTRAVENOUS at 20:13

## 2019-06-17 RX ADMIN — METHOCARBAMOL 750 MG: 750 TABLET, FILM COATED ORAL at 20:13

## 2019-06-18 LAB
ALBUMIN SERPL BCP-MCNC: 3.3 G/DL (ref 3.5–5)
ALP SERPL-CCNC: 127 U/L (ref 46–116)
ALT SERPL W P-5'-P-CCNC: 102 U/L (ref 12–78)
ANION GAP SERPL CALCULATED.3IONS-SCNC: 4 MMOL/L (ref 4–13)
AST SERPL W P-5'-P-CCNC: 115 U/L (ref 5–45)
BILIRUB SERPL-MCNC: 0.37 MG/DL (ref 0.2–1)
BUN SERPL-MCNC: 8 MG/DL (ref 5–25)
CALCIUM SERPL-MCNC: 9.4 MG/DL (ref 8.3–10.1)
CHLORIDE SERPL-SCNC: 111 MMOL/L (ref 100–108)
CK MB SERPL-MCNC: 5.3 NG/ML (ref 0–5)
CK MB SERPL-MCNC: <1 % (ref 0–2.5)
CK SERPL-CCNC: 6422 U/L (ref 39–308)
CO2 SERPL-SCNC: 27 MMOL/L (ref 21–32)
CREAT SERPL-MCNC: 0.57 MG/DL (ref 0.6–1.3)
ERYTHROCYTE [DISTWIDTH] IN BLOOD BY AUTOMATED COUNT: 13.4 % (ref 11.6–15.1)
GFR SERPL CREATININE-BSD FRML MDRD: 142 ML/MIN/1.73SQ M
GLUCOSE SERPL-MCNC: 98 MG/DL (ref 65–140)
HAV IGM SER QL: NORMAL
HBV CORE IGM SER QL: NORMAL
HBV SURFACE AG SER QL: NORMAL
HCT VFR BLD AUTO: 36.3 % (ref 36.5–49.3)
HCV AB SER QL: NORMAL
HGB BLD-MCNC: 11.5 G/DL (ref 12–17)
MCH RBC QN AUTO: 27.5 PG (ref 26.8–34.3)
MCHC RBC AUTO-ENTMCNC: 31.7 G/DL (ref 31.4–37.4)
MCV RBC AUTO: 87 FL (ref 82–98)
PLATELET # BLD AUTO: 229 THOUSANDS/UL (ref 149–390)
PMV BLD AUTO: 9.8 FL (ref 8.9–12.7)
POTASSIUM SERPL-SCNC: 3.9 MMOL/L (ref 3.5–5.3)
PROT SERPL-MCNC: 6.3 G/DL (ref 6.4–8.2)
RBC # BLD AUTO: 4.18 MILLION/UL (ref 3.88–5.62)
SODIUM SERPL-SCNC: 142 MMOL/L (ref 136–145)
WBC # BLD AUTO: 6.16 THOUSAND/UL (ref 4.31–10.16)

## 2019-06-18 PROCEDURE — 82553 CREATINE MB FRACTION: CPT | Performed by: PHYSICIAN ASSISTANT

## 2019-06-18 PROCEDURE — G8988 SELF CARE GOAL STATUS: HCPCS

## 2019-06-18 PROCEDURE — G8979 MOBILITY GOAL STATUS: HCPCS

## 2019-06-18 PROCEDURE — 99254 IP/OBS CNSLTJ NEW/EST MOD 60: CPT | Performed by: INTERNAL MEDICINE

## 2019-06-18 PROCEDURE — 99232 SBSQ HOSP IP/OBS MODERATE 35: CPT | Performed by: PHYSICIAN ASSISTANT

## 2019-06-18 PROCEDURE — G8980 MOBILITY D/C STATUS: HCPCS

## 2019-06-18 PROCEDURE — NS001 PR NO SIGNATURE OR ATTESTATION: Performed by: ORTHOPAEDIC SURGERY

## 2019-06-18 PROCEDURE — G8978 MOBILITY CURRENT STATUS: HCPCS

## 2019-06-18 PROCEDURE — 97166 OT EVAL MOD COMPLEX 45 MIN: CPT

## 2019-06-18 PROCEDURE — 80053 COMPREHEN METABOLIC PANEL: CPT | Performed by: PHYSICIAN ASSISTANT

## 2019-06-18 PROCEDURE — G8987 SELF CARE CURRENT STATUS: HCPCS

## 2019-06-18 PROCEDURE — 97163 PT EVAL HIGH COMPLEX 45 MIN: CPT

## 2019-06-18 PROCEDURE — 85027 COMPLETE CBC AUTOMATED: CPT | Performed by: PHYSICIAN ASSISTANT

## 2019-06-18 PROCEDURE — G8989 SELF CARE D/C STATUS: HCPCS

## 2019-06-18 PROCEDURE — 82550 ASSAY OF CK (CPK): CPT | Performed by: PHYSICIAN ASSISTANT

## 2019-06-18 RX ORDER — IBUPROFEN 400 MG/1
400 TABLET ORAL EVERY 6 HOURS PRN
Status: DISCONTINUED | OUTPATIENT
Start: 2019-06-18 | End: 2019-06-20 | Stop reason: HOSPADM

## 2019-06-18 RX ADMIN — SODIUM CHLORIDE 125 ML/HR: 0.9 INJECTION, SOLUTION INTRAVENOUS at 22:26

## 2019-06-18 RX ADMIN — CEFTAROLINE FOSAMIL 600 MG: 600 POWDER, FOR SOLUTION INTRAVENOUS at 21:02

## 2019-06-18 RX ADMIN — ENOXAPARIN SODIUM 40 MG: 40 INJECTION SUBCUTANEOUS at 09:08

## 2019-06-18 RX ADMIN — CEFTAROLINE FOSAMIL 600 MG: 600 POWDER, FOR SOLUTION INTRAVENOUS at 03:42

## 2019-06-18 RX ADMIN — GABAPENTIN 200 MG: 100 CAPSULE ORAL at 21:01

## 2019-06-18 RX ADMIN — CEFTAROLINE FOSAMIL 600 MG: 600 POWDER, FOR SOLUTION INTRAVENOUS at 12:36

## 2019-06-18 RX ADMIN — MONTELUKAST SODIUM 10 MG: 10 TABLET, FILM COATED ORAL at 09:07

## 2019-06-18 RX ADMIN — GABAPENTIN 200 MG: 100 CAPSULE ORAL at 09:07

## 2019-06-18 RX ADMIN — METHOCARBAMOL 750 MG: 750 TABLET, FILM COATED ORAL at 05:26

## 2019-06-18 RX ADMIN — GABAPENTIN 200 MG: 100 CAPSULE ORAL at 15:53

## 2019-06-18 RX ADMIN — METHOCARBAMOL 750 MG: 750 TABLET, FILM COATED ORAL at 21:01

## 2019-06-18 RX ADMIN — IBUPROFEN 400 MG: 400 TABLET ORAL at 05:26

## 2019-06-18 RX ADMIN — SODIUM CHLORIDE 125 ML/HR: 0.9 INJECTION, SOLUTION INTRAVENOUS at 03:45

## 2019-06-19 LAB
ALBUMIN SERPL BCP-MCNC: 3.3 G/DL (ref 3.5–5)
ALP SERPL-CCNC: 125 U/L (ref 46–116)
ALT SERPL W P-5'-P-CCNC: 101 U/L (ref 12–78)
ANION GAP SERPL CALCULATED.3IONS-SCNC: 4 MMOL/L (ref 4–13)
AST SERPL W P-5'-P-CCNC: 91 U/L (ref 5–45)
BASOPHILS # BLD AUTO: 0.09 THOUSANDS/ΜL (ref 0–0.1)
BASOPHILS NFR BLD AUTO: 2 % (ref 0–1)
BILIRUB SERPL-MCNC: 0.41 MG/DL (ref 0.2–1)
BUN SERPL-MCNC: 7 MG/DL (ref 5–25)
CALCIUM SERPL-MCNC: 9.5 MG/DL (ref 8.3–10.1)
CHLORIDE SERPL-SCNC: 109 MMOL/L (ref 100–108)
CK MB SERPL-MCNC: 5.1 NG/ML (ref 0–5)
CK MB SERPL-MCNC: <1 % (ref 0–2.5)
CK SERPL-CCNC: 3091 U/L (ref 39–308)
CO2 SERPL-SCNC: 26 MMOL/L (ref 21–32)
CREAT SERPL-MCNC: 0.63 MG/DL (ref 0.6–1.3)
EOSINOPHIL # BLD AUTO: 0.71 THOUSAND/ΜL (ref 0–0.61)
EOSINOPHIL NFR BLD AUTO: 12 % (ref 0–6)
ERYTHROCYTE [DISTWIDTH] IN BLOOD BY AUTOMATED COUNT: 13.2 % (ref 11.6–15.1)
GFR SERPL CREATININE-BSD FRML MDRD: 136 ML/MIN/1.73SQ M
GLUCOSE SERPL-MCNC: 84 MG/DL (ref 65–140)
HCT VFR BLD AUTO: 37.3 % (ref 36.5–49.3)
HGB BLD-MCNC: 12.1 G/DL (ref 12–17)
IMM GRANULOCYTES # BLD AUTO: 0.01 THOUSAND/UL (ref 0–0.2)
IMM GRANULOCYTES NFR BLD AUTO: 0 % (ref 0–2)
LYMPHOCYTES # BLD AUTO: 2.38 THOUSANDS/ΜL (ref 0.6–4.47)
LYMPHOCYTES NFR BLD AUTO: 41 % (ref 14–44)
MCH RBC QN AUTO: 27.7 PG (ref 26.8–34.3)
MCHC RBC AUTO-ENTMCNC: 32.4 G/DL (ref 31.4–37.4)
MCV RBC AUTO: 85 FL (ref 82–98)
MONOCYTES # BLD AUTO: 0.51 THOUSAND/ΜL (ref 0.17–1.22)
MONOCYTES NFR BLD AUTO: 9 % (ref 4–12)
NEUTROPHILS # BLD AUTO: 2.07 THOUSANDS/ΜL (ref 1.85–7.62)
NEUTS SEG NFR BLD AUTO: 36 % (ref 43–75)
NRBC BLD AUTO-RTO: 0 /100 WBCS
PLATELET # BLD AUTO: 227 THOUSANDS/UL (ref 149–390)
PMV BLD AUTO: 9.6 FL (ref 8.9–12.7)
POTASSIUM SERPL-SCNC: 3.7 MMOL/L (ref 3.5–5.3)
PROT SERPL-MCNC: 6.8 G/DL (ref 6.4–8.2)
RBC # BLD AUTO: 4.37 MILLION/UL (ref 3.88–5.62)
SODIUM SERPL-SCNC: 139 MMOL/L (ref 136–145)
WBC # BLD AUTO: 5.77 THOUSAND/UL (ref 4.31–10.16)

## 2019-06-19 PROCEDURE — 99232 SBSQ HOSP IP/OBS MODERATE 35: CPT | Performed by: INTERNAL MEDICINE

## 2019-06-19 PROCEDURE — 85025 COMPLETE CBC W/AUTO DIFF WBC: CPT | Performed by: PHYSICIAN ASSISTANT

## 2019-06-19 PROCEDURE — 99232 SBSQ HOSP IP/OBS MODERATE 35: CPT | Performed by: FAMILY MEDICINE

## 2019-06-19 PROCEDURE — 82550 ASSAY OF CK (CPK): CPT | Performed by: PHYSICIAN ASSISTANT

## 2019-06-19 PROCEDURE — 80053 COMPREHEN METABOLIC PANEL: CPT | Performed by: PHYSICIAN ASSISTANT

## 2019-06-19 PROCEDURE — 82553 CREATINE MB FRACTION: CPT | Performed by: PHYSICIAN ASSISTANT

## 2019-06-19 RX ADMIN — MONTELUKAST SODIUM 10 MG: 10 TABLET, FILM COATED ORAL at 09:00

## 2019-06-19 RX ADMIN — IBUPROFEN 400 MG: 400 TABLET ORAL at 21:23

## 2019-06-19 RX ADMIN — CEFTAROLINE FOSAMIL 600 MG: 600 POWDER, FOR SOLUTION INTRAVENOUS at 12:51

## 2019-06-19 RX ADMIN — METHOCARBAMOL 750 MG: 750 TABLET, FILM COATED ORAL at 07:29

## 2019-06-19 RX ADMIN — SODIUM CHLORIDE 125 ML/HR: 0.9 INJECTION, SOLUTION INTRAVENOUS at 05:16

## 2019-06-19 RX ADMIN — GABAPENTIN 200 MG: 100 CAPSULE ORAL at 09:00

## 2019-06-19 RX ADMIN — SODIUM CHLORIDE 125 ML/HR: 0.9 INJECTION, SOLUTION INTRAVENOUS at 16:22

## 2019-06-19 RX ADMIN — METHOCARBAMOL 750 MG: 750 TABLET, FILM COATED ORAL at 21:23

## 2019-06-19 RX ADMIN — GABAPENTIN 200 MG: 100 CAPSULE ORAL at 21:24

## 2019-06-19 RX ADMIN — CEFTAROLINE FOSAMIL 600 MG: 600 POWDER, FOR SOLUTION INTRAVENOUS at 05:16

## 2019-06-19 RX ADMIN — GABAPENTIN 200 MG: 100 CAPSULE ORAL at 16:19

## 2019-06-19 RX ADMIN — ENOXAPARIN SODIUM 40 MG: 40 INJECTION SUBCUTANEOUS at 09:00

## 2019-06-19 RX ADMIN — CEFTAROLINE FOSAMIL 600 MG: 600 POWDER, FOR SOLUTION INTRAVENOUS at 21:23

## 2019-06-20 ENCOUNTER — TELEPHONE (OUTPATIENT)
Dept: INFECTIOUS DISEASES | Facility: CLINIC | Age: 29
End: 2019-06-20

## 2019-06-20 VITALS
TEMPERATURE: 98.2 F | HEIGHT: 63 IN | BODY MASS INDEX: 26.22 KG/M2 | DIASTOLIC BLOOD PRESSURE: 71 MMHG | OXYGEN SATURATION: 99 % | HEART RATE: 58 BPM | SYSTOLIC BLOOD PRESSURE: 111 MMHG | WEIGHT: 148 LBS | RESPIRATION RATE: 16 BRPM

## 2019-06-20 DIAGNOSIS — M00.062 STAPHYLOCOCCAL ARTHRITIS OF LEFT KNEE (HCC): Primary | ICD-10-CM

## 2019-06-20 LAB
ALBUMIN SERPL BCP-MCNC: 3.4 G/DL (ref 3.5–5)
ALP SERPL-CCNC: 128 U/L (ref 46–116)
ALT SERPL W P-5'-P-CCNC: 100 U/L (ref 12–78)
ANION GAP SERPL CALCULATED.3IONS-SCNC: 4 MMOL/L (ref 4–13)
AST SERPL W P-5'-P-CCNC: 64 U/L (ref 5–45)
BILIRUB SERPL-MCNC: 0.55 MG/DL (ref 0.2–1)
BUN SERPL-MCNC: 7 MG/DL (ref 5–25)
CALCIUM SERPL-MCNC: 9.5 MG/DL (ref 8.3–10.1)
CHLORIDE SERPL-SCNC: 109 MMOL/L (ref 100–108)
CK MB SERPL-MCNC: 3.5 NG/ML (ref 0–5)
CK MB SERPL-MCNC: <1 % (ref 0–2.5)
CK SERPL-CCNC: 1072 U/L (ref 39–308)
CO2 SERPL-SCNC: 26 MMOL/L (ref 21–32)
CREAT SERPL-MCNC: 0.57 MG/DL (ref 0.6–1.3)
GFR SERPL CREATININE-BSD FRML MDRD: 142 ML/MIN/1.73SQ M
GLUCOSE SERPL-MCNC: 85 MG/DL (ref 65–140)
POTASSIUM SERPL-SCNC: 3.7 MMOL/L (ref 3.5–5.3)
PROT SERPL-MCNC: 6.7 G/DL (ref 6.4–8.2)
SODIUM SERPL-SCNC: 139 MMOL/L (ref 136–145)

## 2019-06-20 PROCEDURE — 99232 SBSQ HOSP IP/OBS MODERATE 35: CPT | Performed by: INTERNAL MEDICINE

## 2019-06-20 PROCEDURE — 80053 COMPREHEN METABOLIC PANEL: CPT | Performed by: FAMILY MEDICINE

## 2019-06-20 PROCEDURE — 82553 CREATINE MB FRACTION: CPT | Performed by: FAMILY MEDICINE

## 2019-06-20 PROCEDURE — 82550 ASSAY OF CK (CPK): CPT | Performed by: FAMILY MEDICINE

## 2019-06-20 PROCEDURE — 99239 HOSP IP/OBS DSCHRG MGMT >30: CPT | Performed by: FAMILY MEDICINE

## 2019-06-20 RX ADMIN — ENOXAPARIN SODIUM 40 MG: 40 INJECTION SUBCUTANEOUS at 09:13

## 2019-06-20 RX ADMIN — GABAPENTIN 200 MG: 100 CAPSULE ORAL at 09:13

## 2019-06-20 RX ADMIN — CEFTAROLINE FOSAMIL 600 MG: 600 POWDER, FOR SOLUTION INTRAVENOUS at 05:17

## 2019-06-20 RX ADMIN — SODIUM CHLORIDE 125 ML/HR: 0.9 INJECTION, SOLUTION INTRAVENOUS at 01:17

## 2019-06-20 RX ADMIN — MONTELUKAST SODIUM 10 MG: 10 TABLET, FILM COATED ORAL at 09:13

## 2019-06-20 RX ADMIN — METHOCARBAMOL 750 MG: 750 TABLET, FILM COATED ORAL at 09:20

## 2019-06-25 DIAGNOSIS — M00.062 STAPHYLOCOCCAL ARTHRITIS OF LEFT KNEE (HCC): Primary | ICD-10-CM

## 2019-06-25 LAB
MYCOBACTERIUM SPEC CULT: NORMAL
RHODAMINE-AURAMINE STN SPEC: NORMAL

## 2019-06-26 ENCOUNTER — EVALUATION (OUTPATIENT)
Dept: PHYSICAL THERAPY | Facility: REHABILITATION | Age: 29
End: 2019-06-26
Payer: COMMERCIAL

## 2019-06-26 DIAGNOSIS — Z98.890 STATUS POST SURGERY: Primary | ICD-10-CM

## 2019-06-26 PROCEDURE — 97162 PT EVAL MOD COMPLEX 30 MIN: CPT | Performed by: PHYSICAL THERAPIST

## 2019-06-26 PROCEDURE — 97140 MANUAL THERAPY 1/> REGIONS: CPT | Performed by: PHYSICAL THERAPIST

## 2019-06-27 ENCOUNTER — TELEPHONE (OUTPATIENT)
Dept: OBGYN CLINIC | Facility: HOSPITAL | Age: 29
End: 2019-06-27

## 2019-06-27 DIAGNOSIS — M00.062 STAPHYLOCOCCAL ARTHRITIS OF LEFT KNEE (HCC): ICD-10-CM

## 2019-06-27 RX ORDER — GABAPENTIN 100 MG/1
200 CAPSULE ORAL 3 TIMES DAILY
Qty: 90 CAPSULE | Refills: 0 | Status: SHIPPED | OUTPATIENT
Start: 2019-06-27 | End: 2019-07-22 | Stop reason: SDUPTHER

## 2019-07-01 ENCOUNTER — OFFICE VISIT (OUTPATIENT)
Dept: PHYSICAL THERAPY | Facility: REHABILITATION | Age: 29
End: 2019-07-01
Payer: COMMERCIAL

## 2019-07-01 DIAGNOSIS — Z98.890 STATUS POST SURGERY: Primary | ICD-10-CM

## 2019-07-01 PROCEDURE — 97110 THERAPEUTIC EXERCISES: CPT | Performed by: PHYSICAL THERAPIST

## 2019-07-01 PROCEDURE — 97140 MANUAL THERAPY 1/> REGIONS: CPT | Performed by: PHYSICAL THERAPIST

## 2019-07-01 NOTE — PROGRESS NOTES
Daily Note     Today's date: 2019  Patient name: Estevan Muñoz  : 1990  MRN: [id number]  Referring provider: Ovidio Gallardo PA-C  Dx:   Encounter Diagnosis     ICD-10-CM    1  Status post surgery Z98 [id number]                   Subjective: Reports working on ROM at home  Objective: See treatment diary below      Assessment: Tolerated treatment well  Patient would benefit from continued PT      Plan: Continue per plan of care        Precautions: WBAT      Manual  7 1            Prone knee flexion st MACHO - 10'            Seated knee flex Macho 5'            Supine knee ext st MACHO - 10'                                          Exercise Diary  7 1            Nu Step 10'            Quad sets 10s x15            SAQ 5s x20            SLR - flex 2x10            Prone knee flexion 2x10                                                                                                                                                                                                                   Modalities

## 2019-07-03 ENCOUNTER — TELEPHONE (OUTPATIENT)
Dept: OBGYN CLINIC | Facility: HOSPITAL | Age: 29
End: 2019-07-03

## 2019-07-03 ENCOUNTER — HOSPITAL ENCOUNTER (EMERGENCY)
Facility: HOSPITAL | Age: 29
Discharge: HOME | End: 2019-07-03
Attending: EMERGENCY MEDICINE
Payer: COMMERCIAL

## 2019-07-03 ENCOUNTER — APPOINTMENT (OUTPATIENT)
Dept: PHYSICAL THERAPY | Facility: REHABILITATION | Age: 29
End: 2019-07-03
Payer: COMMERCIAL

## 2019-07-03 ENCOUNTER — TELEPHONE (OUTPATIENT)
Dept: INFECTIOUS DISEASES | Facility: CLINIC | Age: 29
End: 2019-07-03

## 2019-07-03 VITALS
RESPIRATION RATE: 16 BRPM | BODY MASS INDEX: 26.17 KG/M2 | DIASTOLIC BLOOD PRESSURE: 81 MMHG | HEIGHT: 63 IN | TEMPERATURE: 98.2 F | SYSTOLIC BLOOD PRESSURE: 145 MMHG | HEART RATE: 70 BPM | OXYGEN SATURATION: 99 % | WEIGHT: 147.71 LBS

## 2019-07-03 DIAGNOSIS — R74.8 ELEVATED CK: ICD-10-CM

## 2019-07-03 DIAGNOSIS — R74.8 ELEVATED CPK: Primary | ICD-10-CM

## 2019-07-03 LAB
ANION GAP SERPL CALCULATED.3IONS-SCNC: 6 MMOL/L (ref 4–13)
BASOPHILS # BLD AUTO: 0.06 THOUSANDS/ΜL (ref 0–0.1)
BASOPHILS NFR BLD AUTO: 1 % (ref 0–1)
BILIRUB UR QL STRIP: NEGATIVE
BUN SERPL-MCNC: 8 MG/DL (ref 5–25)
CALCIUM SERPL-MCNC: 10.3 MG/DL (ref 8.3–10.1)
CHLORIDE SERPL-SCNC: 104 MMOL/L (ref 100–108)
CK MB SERPL-MCNC: 8.6 NG/ML (ref 0–5)
CK MB SERPL-MCNC: <1 % (ref 0–2.5)
CK SERPL-CCNC: 2463 U/L (ref 39–308)
CLARITY UR: CLEAR
CO2 SERPL-SCNC: 29 MMOL/L (ref 21–32)
COLOR UR: YELLOW
COLOR, POC: NORMAL
CREAT SERPL-MCNC: 0.67 MG/DL (ref 0.6–1.3)
EOSINOPHIL # BLD AUTO: 0.4 THOUSAND/ΜL (ref 0–0.61)
EOSINOPHIL NFR BLD AUTO: 5 % (ref 0–6)
ERYTHROCYTE [DISTWIDTH] IN BLOOD BY AUTOMATED COUNT: 13.3 % (ref 11.6–15.1)
GFR SERPL CREATININE-BSD FRML MDRD: 133 ML/MIN/1.73SQ M
GLUCOSE SERPL-MCNC: 80 MG/DL (ref 65–140)
GLUCOSE UR STRIP-MCNC: NEGATIVE MG/DL
HCT VFR BLD AUTO: 43.1 % (ref 36.5–49.3)
HGB BLD-MCNC: 13.8 G/DL (ref 12–17)
HGB UR QL STRIP.AUTO: NEGATIVE
IMM GRANULOCYTES # BLD AUTO: 0.02 THOUSAND/UL (ref 0–0.2)
IMM GRANULOCYTES NFR BLD AUTO: 0 % (ref 0–2)
KETONES UR STRIP-MCNC: NEGATIVE MG/DL
LEUKOCYTE ESTERASE UR QL STRIP: NEGATIVE
LYMPHOCYTES # BLD AUTO: 3.05 THOUSANDS/ΜL (ref 0.6–4.47)
LYMPHOCYTES NFR BLD AUTO: 36 % (ref 14–44)
MCH RBC QN AUTO: 27 PG (ref 26.8–34.3)
MCHC RBC AUTO-ENTMCNC: 32 G/DL (ref 31.4–37.4)
MCV RBC AUTO: 84 FL (ref 82–98)
MONOCYTES # BLD AUTO: 0.62 THOUSAND/ΜL (ref 0.17–1.22)
MONOCYTES NFR BLD AUTO: 7 % (ref 4–12)
NEUTROPHILS # BLD AUTO: 4.3 THOUSANDS/ΜL (ref 1.85–7.62)
NEUTS SEG NFR BLD AUTO: 51 % (ref 43–75)
NITRITE UR QL STRIP: NEGATIVE
NRBC BLD AUTO-RTO: 0 /100 WBCS
PH UR STRIP.AUTO: 7 [PH] (ref 4.5–8)
PLATELET # BLD AUTO: 300 THOUSANDS/UL (ref 149–390)
PMV BLD AUTO: 9.1 FL (ref 8.9–12.7)
POTASSIUM SERPL-SCNC: 3.9 MMOL/L (ref 3.5–5.3)
PROT UR STRIP-MCNC: NEGATIVE MG/DL
RBC # BLD AUTO: 5.11 MILLION/UL (ref 3.88–5.62)
SODIUM SERPL-SCNC: 139 MMOL/L (ref 136–145)
SP GR UR STRIP.AUTO: 1.01 (ref 1–1.03)
TSH SERPL DL<=0.05 MIU/L-ACNC: 1.82 UIU/ML (ref 0.36–3.74)
UROBILINOGEN UR QL STRIP.AUTO: 0.2 E.U./DL
WBC # BLD AUTO: 8.45 THOUSAND/UL (ref 4.31–10.16)

## 2019-07-03 PROCEDURE — 84443 ASSAY THYROID STIM HORMONE: CPT | Performed by: EMERGENCY MEDICINE

## 2019-07-03 PROCEDURE — 80048 BASIC METABOLIC PNL TOTAL CA: CPT | Performed by: EMERGENCY MEDICINE

## 2019-07-03 PROCEDURE — 85025 COMPLETE CBC W/AUTO DIFF WBC: CPT | Performed by: EMERGENCY MEDICINE

## 2019-07-03 PROCEDURE — 99283 EMERGENCY DEPT VISIT LOW MDM: CPT

## 2019-07-03 PROCEDURE — 96360 HYDRATION IV INFUSION INIT: CPT

## 2019-07-03 PROCEDURE — 82550 ASSAY OF CK (CPK): CPT | Performed by: EMERGENCY MEDICINE

## 2019-07-03 PROCEDURE — 81003 URINALYSIS AUTO W/O SCOPE: CPT

## 2019-07-03 PROCEDURE — 82553 CREATINE MB FRACTION: CPT | Performed by: EMERGENCY MEDICINE

## 2019-07-03 PROCEDURE — 36415 COLL VENOUS BLD VENIPUNCTURE: CPT | Performed by: EMERGENCY MEDICINE

## 2019-07-03 PROCEDURE — 99283 EMERGENCY DEPT VISIT LOW MDM: CPT | Performed by: EMERGENCY MEDICINE

## 2019-07-03 RX ADMIN — SODIUM CHLORIDE 1000 ML: 0.9 INJECTION, SOLUTION INTRAVENOUS at 17:24

## 2019-07-03 NOTE — TELEPHONE ENCOUNTER
Patient is calling to state he saw his family doctor who requested that he have the muscle breakdown test (CKNB), which results are in [facility]'s chart  The numbers are high  Patient is drinking a lot more water now, since Tuesday, about a gallon to a gallon and a half  He is drinking a lot more water/fluids since he had the test done last     His doctor says to go to the E R to get tested and maybe have fluids, but he's questioning if he really should go to the E R, or if he should see a specialist about muscle breakdown? Should he go to the ER today? He wants to know what's really wrong with him, and not just keep getting on fluids  He is hoping to get a call back today  Please advise      Callback #[phone]

## 2019-07-03 NOTE — TELEPHONE ENCOUNTER
Received a call from pt today asking if he needed any more abx? And if he was able to take hydrocodone? As per pt's chart-abx therapy is complete  Called pt back and let him know that he did not need any more abx therapy  Advised him to contact ortho regarding the hydrocodone  Also per Dr Licona Organ recommendations: Pt's ck is elevated again  Pt should head back to the ER for a workup  Informed pt that his level was elevated and our recommendations  Pt said he would take that into consideration and call ortho first to see what they recommended as well  He thanked us for the call
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
## 2019-07-05 ENCOUNTER — APPOINTMENT (OUTPATIENT)
Dept: PHYSICAL THERAPY | Facility: REHABILITATION | Age: 29
End: 2019-07-05
Payer: COMMERCIAL
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
## 2019-07-08 ENCOUNTER — OFFICE VISIT (OUTPATIENT)
Dept: PHYSICAL THERAPY | Facility: REHABILITATION | Age: 29
End: 2019-07-08
Payer: COMMERCIAL

## 2019-07-08 DIAGNOSIS — Z98.890 STATUS POST SURGERY: Primary | ICD-10-CM

## 2019-07-08 PROCEDURE — 97110 THERAPEUTIC EXERCISES: CPT

## 2019-07-08 PROCEDURE — 97140 MANUAL THERAPY 1/> REGIONS: CPT

## 2019-07-08 NOTE — PROGRESS NOTES
Daily Note     Today's date: 2019  Patient name: Mitchel Leahy  : 1990  MRN: [id number]  Referring provider: Junnie Goltz, PA-C  Dx:   Encounter Diagnosis     ICD-10-CM    1  Status post surgery Z98 890        Start Time:   Stop Time: 154  Total time in clinic (min): 60 minutes    Subjective: Reports good compliance with HEP program        Objective: See treatment diary below      Assessment: Tolerated treatment well  Will continue to benefit from a scar tissue intervention to prevent and reduce adhesions  He is making progress with intervention  Patient would benefit from continued PT      Plan: Continue per plan of care        Precautions: WBAT      Manual  7 1             Prone knee flexion st MACHO - 10' KS 10'             Seated knee flex Macho 5' KS 6'           Supine knee ext st MACHO - 10' KS8'           Scar massage/mobs  KS 4'                            Exercise Diary  7            Nu Step 10' 10"           Quad sets 10s x15 10 s x 15           SAQ 5s x20 5s x 20           SLR - flex 2x10 2 x 10           Prone knee flexion 2x10 2 x 10           Gastro stretch with step standing  10" x 10           Prone hang  5 min           Heel lift / mini squatts  10 x                                                                                                                                                                           Modalities

## 2019-07-10 ENCOUNTER — OFFICE VISIT (OUTPATIENT)
Dept: PHYSICAL THERAPY | Facility: REHABILITATION | Age: 29
End: 2019-07-10
Payer: COMMERCIAL

## 2019-07-10 DIAGNOSIS — Z98.890 STATUS POST SURGERY: Primary | ICD-10-CM

## 2019-07-10 PROCEDURE — 97110 THERAPEUTIC EXERCISES: CPT

## 2019-07-10 PROCEDURE — 97140 MANUAL THERAPY 1/> REGIONS: CPT

## 2019-07-10 NOTE — PROGRESS NOTES
Daily Note     Today's date: 7/10/2019  Patient name: Jerod Lucero  : 1990  MRN: [id number]  Referring provider: Yoselin Geronimo PA-C  Dx:   Encounter Diagnosis     ICD-10-CM    1  Status post surgery Z98 890                   Subjective: Patient offers no new complaints at this time  1:1 45 min, indep TE remaining  Objective: See treatment diary below  Precautions: WBAT      Manual  7 1  7/8 7/10          Prone knee flexion st MACHO - 10' KS 10'   HS          Seated knee flex LEFT Macho 5' KS 6' HS          Supine knee ext st MACHO - 10' KS8' HS          Scar massage/mobs  KS 4' HS                           Exercise Diary  7 1 7/8 7/10          Nu Step 10' 10" 10'          Quad sets 10s x15 10 s x 15 10"x10          SAQ 5s x20 5s x 20 5" 2x10          SLR - flex 2x10 2 x 10 2x10          Prone knee flexion 2x10 2 x 10           Gastroc stretch with step standing  10" x 10 10"x10          Prone hang  5 min           Heel lift / mini squats  10 x x15 ea                                                                                                                                                                          Modalities                                                           Assessment: Patient did well with TE as listed, shows strength improvements  Plan: Continue per plan of care

## 2019-07-12 ENCOUNTER — OFFICE VISIT (OUTPATIENT)
Dept: PHYSICAL THERAPY | Facility: REHABILITATION | Age: 29
End: 2019-07-12
Payer: COMMERCIAL

## 2019-07-12 DIAGNOSIS — Z98.890 STATUS POST SURGERY: Primary | ICD-10-CM

## 2019-07-12 PROCEDURE — 97110 THERAPEUTIC EXERCISES: CPT

## 2019-07-12 PROCEDURE — 97140 MANUAL THERAPY 1/> REGIONS: CPT

## 2019-07-12 NOTE — PROGRESS NOTES
Daily Note     Today's date: 2019  Patient name: Enid Stacy  : 1990  MRN: [id number]  Referring provider: Carlos Marino PA-C  Dx:   Encounter Diagnosis     ICD-10-CM    1  Status post surgery Z98 890        Start Time: 1140  Stop Time: 6124  Total time in clinic (min): 55 minutes    Subjective: Patient reports medial knee discomfort  Objective: See treatment diary below  Precautions: WBAT      Manual  7 1  7/8 7/10 7/12         Prone knee flexion st MACHO - 10' KS 10'   HS KS         Seated knee flex LEFT Macho 5' KS 6' HS          Supine knee ext st MACHO - 10' KS8' HS KS         Scar massage/mobs  KS 4' HS KS  patelar mobs/SM                          Exercise Diary  7 1 7/8 7/10 7/12         Nu Step 10' 10" 10' 10"         Quad sets 10s x15 10 s x 15 10"x10 10" x 10         SAQ 5s x20 5s x 20 5" 2x10 5" 2 x10         SLR - flex 2x10 2 x 10 2x10 2 x 10         Prone knee flexion 2x10 2 x 10  10-30  Degrees  10x         Gastroc stretch with step standing  10" x 10 10"x10 10" x 10         Prone hang  5 min           Heel lift / mini squats  10 x x15 ea X 15 ea         Gait training    wbat 100' one crutch                                                                                                                                                            Modalities                                                           Assessment: Patient did well with TE as listed, shows PROM tolerance  He will continue to benefit from skilled PT intervention  Plan: Continue per plan of care

## 2019-07-15 ENCOUNTER — OFFICE VISIT (OUTPATIENT)
Dept: PHYSICAL THERAPY | Facility: REHABILITATION | Age: 29
End: 2019-07-15
Payer: COMMERCIAL

## 2019-07-15 DIAGNOSIS — Z98.890 STATUS POST SURGERY: Primary | ICD-10-CM

## 2019-07-15 PROCEDURE — 97140 MANUAL THERAPY 1/> REGIONS: CPT

## 2019-07-15 PROCEDURE — 97112 NEUROMUSCULAR REEDUCATION: CPT

## 2019-07-15 PROCEDURE — 97110 THERAPEUTIC EXERCISES: CPT

## 2019-07-15 NOTE — PROGRESS NOTES
Daily Note     Today's date: 7/15/2019  Patient name: Chase Parisi  : 1990  MRN: [id number]  Referring provider: Amada Calderon PA-C  Dx:   Encounter Diagnosis     ICD-10-CM    1  Status post surgery Z98 890                   Subjective: Patient has no new complaints at this time  Objective: See treatment diary below  Precautions: WBAT      Manual  7 1  7/8 7/10 7/12 7/15        Prone knee flexion st RAPHAEL - 10' KS 10'   HS KS HS        Seated knee flex LEFT Raphael 5' KS 6' HS          Supine knee ext st RAPHAEL - 10' KS8' HS KS HS        Scar massage/mobs  KS 4' HS  ISTM KS  patelar mobs/SM HS ISTM                         Exercise Diary  7 1 7/8 7/10 7/12 7/15        Nu Step 10' 10" 10' 10' 10'        Quad sets 10s x15 10 s x 15 10"x10 10" x 10 5" 3x10        SAQ 5s x20 5s x 20 5" 2x10 5" 2 x10         SLR - flex 2x10 2 x 10 2x10 2 x 10 3x10        Prone knee flexion 2x10 2 x 10  10-30  Degrees  10x         Gastroc stretch with step standing  10" x 10 10"x10 10" x 10 10"x10        Prone hang  5 min           Heel lift / mini squats  10 x x15 ea X 15 ea 2x10        Gait training    wbat 100' one crutch 5'                                                                                                                                                           Modalities                                                           Assessment: Patient educated on weight bearing and HEP consistency  Plan: Continue per plan of care

## 2019-07-17 ENCOUNTER — OFFICE VISIT (OUTPATIENT)
Dept: PHYSICAL THERAPY | Facility: REHABILITATION | Age: 29
End: 2019-07-17
Payer: COMMERCIAL

## 2019-07-17 DIAGNOSIS — Z98.890 STATUS POST SURGERY: Primary | ICD-10-CM

## 2019-07-17 PROCEDURE — 97110 THERAPEUTIC EXERCISES: CPT

## 2019-07-17 PROCEDURE — 97140 MANUAL THERAPY 1/> REGIONS: CPT

## 2019-07-17 PROCEDURE — 97112 NEUROMUSCULAR REEDUCATION: CPT

## 2019-07-17 NOTE — PROGRESS NOTES
Daily Note     Today's date: 2019  Patient name: [facility]  : 1990  MRN: [id number]  Referring provider: Zachary Barker PA-C  Dx:   Encounter Diagnosis     ICD-10-CM    1  Status post surgery Z98 890                   Subjective: Patient now ambulating with single axillary crutch  1:1 w/ PTA 45 min, indep TE remaining  Objective: See treatment diary below  Precautions: WBAT      Manual  7 1  7/8 7/10 7/12 7/15 7/17       Prone knee flexion st MACHO - 10' KS 10'   HS KS HS HS       Seated knee flex LEFT Macho 5' KS 6' HS          Supine knee ext st MACHO - 10' KS8' HS KS HS HS       Scar massage/mobs  KS 4' HS  ISTM KS  patelar mobs/SM HS ISTM HS ISTM                        Exercise Diary  7 1 7/8 7/10 7/12 7/15 7/17       Nu Step 10' 10" 10' 10' 10' 10' bike       Quad sets 10s x15 10 s x 15 10"x10 10" x 10 5" 3x10 5" 3x10       SAQ 5s x20 5s x 20 5" 2x10 5" 2 x10         SLR - flex 2x10 2 x 10 2x10 2 x 10 3x10 3x10       Prone knee flexion 2x10 2 x 10  10-30  Degrees  10x         Gastroc stretch with step standing  10" x 10 10"x10 10" x 10 10"x10 20"x5       Prone hang  5 min           Heel lift / mini squats  10 x x15 ea X 15 ea 2x10 2x10       Gait training    wbat 100' one crutch 5'        SLS      fatigue       Extended knee flex      EOT BTB 6'                                                                                                                                Modalities                                                           Assessment: Patient is non compliant with HEP  Plan: Continue per plan of care

## 2019-07-19 ENCOUNTER — TELEPHONE (OUTPATIENT)
Dept: OBGYN CLINIC | Facility: HOSPITAL | Age: 29
End: 2019-07-19

## 2019-07-19 ENCOUNTER — APPOINTMENT (OUTPATIENT)
Dept: PHYSICAL THERAPY | Facility: REHABILITATION | Age: 29
End: 2019-07-19
Payer: COMMERCIAL

## 2019-07-19 NOTE — TELEPHONE ENCOUNTER
Aura Eubanks, sister, contacted Call Center requested refill of their medication  Medication Name:  Gabapentin     Dosage of Med:  100 mg    Frequency of Med:  2 pills 3 times daily    Remaining Medication:  0 pills    Pt also needs:      Medication Name:  motrin    Dosage of Med:  800 mg     Frequency of Med:  1 pill every 8 hours as needed    Remaining Medication:  0 pills    Pt also needs:       Medication Name:  Robaxin     Dosage of Med:  750 mg    Frequency of Med:  1 tablet every 6 hours as needed    Remaining Medication:  0 pills    Pharmacy and Location:  [facility]    Pt   Preferred Callback Phone Number:  [phone]

## 2019-07-22 ENCOUNTER — OFFICE VISIT (OUTPATIENT)
Dept: PHYSICAL THERAPY | Facility: REHABILITATION | Age: 29
End: 2019-07-22
Payer: COMMERCIAL

## 2019-07-22 DIAGNOSIS — Z98.890 STATUS POST SURGERY: ICD-10-CM

## 2019-07-22 DIAGNOSIS — M00.062 STAPHYLOCOCCAL ARTHRITIS OF LEFT KNEE (HCC): ICD-10-CM

## 2019-07-22 DIAGNOSIS — S72.002A CLOSED FRACTURE OF NECK OF LEFT FEMUR, INITIAL ENCOUNTER (HCC): ICD-10-CM

## 2019-07-22 DIAGNOSIS — Z98.890 STATUS POST SURGERY: Primary | ICD-10-CM

## 2019-07-22 PROCEDURE — 97110 THERAPEUTIC EXERCISES: CPT

## 2019-07-22 PROCEDURE — 97112 NEUROMUSCULAR REEDUCATION: CPT

## 2019-07-22 PROCEDURE — 97140 MANUAL THERAPY 1/> REGIONS: CPT

## 2019-07-22 RX ORDER — METHOCARBAMOL 750 MG/1
750 TABLET, FILM COATED ORAL EVERY 6 HOURS PRN
Qty: 120 TABLET | Refills: 0 | Status: SHIPPED | OUTPATIENT
Start: 2019-07-22 | End: 2019-07-25 | Stop reason: SDUPTHER

## 2019-07-22 RX ORDER — GABAPENTIN 100 MG/1
200 CAPSULE ORAL 3 TIMES DAILY
Qty: 90 CAPSULE | Refills: 0 | Status: SHIPPED | OUTPATIENT
Start: 2019-07-22 | End: 2019-07-25 | Stop reason: SDUPTHER

## 2019-07-22 RX ORDER — IBUPROFEN 600 MG/1
600 TABLET ORAL EVERY 8 HOURS PRN
Qty: 30 TABLET | Refills: 0 | Status: SHIPPED | OUTPATIENT
Start: 2019-07-22 | End: 2019-07-25 | Stop reason: SDUPTHER

## 2019-07-22 NOTE — PROGRESS NOTES
Daily Note     Today's date: 2019  Patient name: Trinity Ferguson  : 1990  MRN: [id number]  Referring provider: Lex Soto PA-C  Dx:   Encounter Diagnosis     ICD-10-CM    1  Status post surgery Z98 890                   Subjective: Patient reports exercising in the pool over the weekend  Objective: See treatment diary below  Precautions: WBAT      Manual  7 1  7/8 7/10 7/12 7/15 7/17 7/22 x10'      Prone knee flexion st RAPHAEL - 10' KS 10'   HS KS HS HS HS      Seated knee flex LEFT Raphael 5' KS 6' HS          Supine knee ext st Jeffie Chris - 10' KS8' HS KS HS HS HS      Scar massage/mobs  KS 4' HS  ISTM KS  patelar mobs/SM HS ISTM HS ISTM                        Exercise Diary  7 1 7/8 7/10 7/12 7/15 7/17 7/22      Nu Step 10' 10" 10' 10' 10' 10' bike 10' Nu       Quad sets 10s x15 10 s x 15 10"x10 10" x 10 5" 3x10 5" 3x10 5" 3x10      SAQ 5s x20 5s x 20 5" 2x10 5" 2 x10         SLR - flex 2x10 2 x 10 2x10 2 x 10 3x10 3x10 3x10      Prone knee flexion 2x10 2 x 10  10-30  Degrees  10x         Gastroc stretch with step standing  10" x 10 10"x10 10" x 10 10"x10 20"x5 30"x5      Prone hang  5 min           Heel lift / mini squats  10 x x15 ea X 15 ea 2x10 2x10 3x10 ea      Gait training    wbat 100' one crutch 5'        SLS      fatigue       Extended knee flex      EOT BTB 6' EOT BTB x6'                                                                                                                               Modalities                                                           Assessment: Patient continues to be non compliant with HEP, educated on the importance and benefit from continued therapy inside and outside of the clinic  Plan: Continue per plan of care

## 2019-07-24 ENCOUNTER — OFFICE VISIT (OUTPATIENT)
Dept: PHYSICAL THERAPY | Facility: REHABILITATION | Age: 29
End: 2019-07-24
Payer: COMMERCIAL

## 2019-07-24 DIAGNOSIS — Z98.890 STATUS POST SURGERY: Primary | ICD-10-CM

## 2019-07-24 PROCEDURE — 97140 MANUAL THERAPY 1/> REGIONS: CPT

## 2019-07-24 PROCEDURE — 97112 NEUROMUSCULAR REEDUCATION: CPT

## 2019-07-24 PROCEDURE — 97110 THERAPEUTIC EXERCISES: CPT

## 2019-07-24 NOTE — PROGRESS NOTES
Daily Note     Today's date: 2019  Patient name: Tonya Weinberg  : 1990  MRN: [id number]  Referring provider: Leopold Place, PA-C  Dx:   Encounter Diagnosis     ICD-10-CM    1  Status post surgery Z98 890                   Subjective: Patient offers no new complaints at this time  1:1 w/ PTA 45 min, indep Te remaining  Objective: See treatment diary below  Precautions: WBAT      Manual  7/10 7/12 7/15 7/17 7/22 x10' 7/24 x10'     Prone knee flexion st HS KS HS HS HS HS     Seated knee flex LEFT HS          Supine knee ext st HS KS HS HS HS HS     Scar massage/mobs HS  ISTM KS  patelar mobs/SM HS ISTM HS ISTM                      Exercise Diary  7/10 7/12 7/15 7/17 7/22 7/24     Nu Step 10' 10' 10' 10' bike 10' Nu  10' Nu     Quad sets 10"x10 10" x 10 5" 3x10 5" 3x10 5" 3x10 5" 2x10     SAQ 5" 2x10 5" 2 x10         SLR - flex 2x10 2 x 10 3x10 3x10 3x10 3x10     Prone knee flexion  10-30  Degrees  10x         Gastroc stretch with step standing 10"x10 10" x 10 10"x10 20"x5 30"x5 30"x5     Prone hang      2# x5'     Heel lift / mini squats x15 ea X 15 ea 2x10 2x10 3x10 ea 3x10 ea     Gait training  wbat 100' one crutch 5'        SLS    fatigue  fatigue     Extended knee flex    EOT BTB 6' EOT BTB x6' EOT #3 PTB x7'     sidestepping      3x                                                                                                 Modalities                                                           Assessment: Patient terminal knee ext shows improvement as well as better quad control with SLR  Plan: Continue per plan of care

## 2019-07-25 DIAGNOSIS — S72.002A CLOSED FRACTURE OF NECK OF LEFT FEMUR, INITIAL ENCOUNTER (HCC): ICD-10-CM

## 2019-07-25 DIAGNOSIS — Z98.890 STATUS POST SURGERY: ICD-10-CM

## 2019-07-25 DIAGNOSIS — M00.062 STAPHYLOCOCCAL ARTHRITIS OF LEFT KNEE (HCC): ICD-10-CM

## 2019-07-25 RX ORDER — IBUPROFEN 600 MG/1
600 TABLET ORAL EVERY 8 HOURS PRN
Qty: 30 TABLET | Refills: 0 | Status: SHIPPED | OUTPATIENT
Start: 2019-07-25 | End: 2019-08-27 | Stop reason: SDUPTHER

## 2019-07-25 RX ORDER — METHOCARBAMOL 750 MG/1
750 TABLET, FILM COATED ORAL EVERY 6 HOURS PRN
Qty: 120 TABLET | Refills: 0 | Status: SHIPPED | OUTPATIENT
Start: 2019-07-25 | End: 2019-10-07 | Stop reason: SDUPTHER

## 2019-07-25 RX ORDER — METHOCARBAMOL 750 MG/1
750 TABLET, FILM COATED ORAL EVERY 6 HOURS PRN
Qty: 120 TABLET | Refills: 0 | Status: SHIPPED | OUTPATIENT
Start: 2019-07-25 | End: 2019-07-25 | Stop reason: SDUPTHER

## 2019-07-25 RX ORDER — GABAPENTIN 100 MG/1
200 CAPSULE ORAL 3 TIMES DAILY
Qty: 90 CAPSULE | Refills: 0 | Status: SHIPPED | OUTPATIENT
Start: 2019-07-25 | End: 2019-09-09 | Stop reason: SDUPTHER

## 2019-07-25 NOTE — TELEPHONE ENCOUNTER
Received gabapentin and the motrin  Patient's sister Ivan Bishopjina states Methocarbamol wasn't called it  Please re-send, and advise Ivan Mo when called in      [facility] callback [place]#[phone]

## 2019-07-26 ENCOUNTER — OFFICE VISIT (OUTPATIENT)
Dept: PHYSICAL THERAPY | Facility: REHABILITATION | Age: 29
End: 2019-07-26
Payer: COMMERCIAL

## 2019-07-26 DIAGNOSIS — Z98.890 STATUS POST SURGERY: Primary | ICD-10-CM

## 2019-07-26 PROCEDURE — 97110 THERAPEUTIC EXERCISES: CPT | Performed by: PHYSICAL THERAPIST

## 2019-07-26 PROCEDURE — 97140 MANUAL THERAPY 1/> REGIONS: CPT | Performed by: PHYSICAL THERAPIST

## 2019-07-26 NOTE — PROGRESS NOTES
Daily Note     Today's date: 2019  Patient name: Fifi Srinivasan  : 1990  MRN: [id number]  Referring provider: Zechariah Mayes PA-C  Dx:   No diagnosis found  Subjective: Patient offers no new complaints at this time  1:1 w/ PTA 45 min, indep Te remaining  Objective: See treatment diary below  Precautions: WBAT      Manual  7/10 7/12 7/15 7/17 7/22 x10' 7/24 x10'     Prone knee flexion st HS KS HS HS HS HS MACHO 10'    Seated knee flex LEFT HS          Supine knee ext st HS KS HS HS HS HS MACHO 5'    Scar massage/mobs HS  ISTM KS  patelar mobs/SM HS ISTM HS ISTM                      Exercise Diary  7/10 7/12 7/15 7/17 7/22 7/24 7/26    Nu Step 10' 10' 10' 10' bike 10' Nu  10' Nu 10'    Quad sets 10"x10 10" x 10 5" 3x10 5" 3x10 5" 3x10 5" 2x10 5" 2x10    SAQ 5" 2x10 5" 2 x10         SLR - flex 2x10 2 x 10 3x10 3x10 3x10 3x10 3x10    Prone knee flexion  10-30  Degrees  10x         Gastroc stretch with step standing 10"x10 10" x 10 10"x10 20"x5 30"x5 30"x5 NP    Prone hang      2# x5' 4# 5'    Heel lift / mini squats x15 ea X 15 ea 2x10 2x10 3x10 ea 3x10 ea 3x10 ea    Gait training  wbat 100' one crutch 5'        SLS    fatigue  fatigue     Extended knee flex    EOT BTB 6' EOT BTB x6' EOT #3 PTB x7' 8'    sidestepping      3x                                                                                                 Modalities                                                           Assessment: Small gains in motion evident      Plan: Continue per plan of care

## 2019-07-29 ENCOUNTER — OFFICE VISIT (OUTPATIENT)
Dept: PHYSICAL THERAPY | Facility: REHABILITATION | Age: 29
End: 2019-07-29
Payer: COMMERCIAL

## 2019-07-29 DIAGNOSIS — Z98.890 STATUS POST SURGERY: Primary | ICD-10-CM

## 2019-07-29 PROCEDURE — 97110 THERAPEUTIC EXERCISES: CPT | Performed by: PHYSICAL THERAPIST

## 2019-07-29 NOTE — PROGRESS NOTES
Daily Note     Today's date: 2019  Patient name: Rachel Marte  : 1990  MRN: [id number]  Referring provider: Yesenia Downing PA-C  Dx:   Encounter Diagnosis     ICD-10-CM    1  Status post surgery Z98 890        Start Time:   Stop Time:   Total time in clinic (min): 35 minutes    Subjective: Patient reports that he is trying to manage sx without any medication  Some days are easier than others  Objective: See treatment diary below      Assessment: Decreased tolerance to treatment today and declined manual therapy today due to increased sx following TE  In addition, patient reported significant soreness with performance of quad sets and demonstrates increased quad lag during SLR  Continue to address ROM/strength deficits, as able  Plan: Continue per plan of care  Resume manual, as tolerated        Precautions: WBAT      Manual  7/10 7/12 7/15 7/17 7/22 x10' 7/24 x10'    Prone knee flexion st HS KS HS HS HS HS MACHO 10' nv   Seated knee flex LEFT HS          Supine knee ext st HS KS HS HS HS HS MACHO 5' nv   Scar massage/mobs HS  ISTM KS  patelar mobs/SM HS ISTM HS ISTM                      Exercise Diary  7/10 7/12 7/15 7/17 7/22 7/24 7/26 7/29   Nu Step 10' 10' 10' 10' bike 10' Nu  10' Nu 10' 10' Nu   Quad sets 10"x10 10" x 10 5" 3x10 5" 3x10 5" 3x10 5" 2x10 5" 2x10 20x5"   SAQ 5" 2x10 5" 2 x10         SLR - flex 2x10 2 x 10 3x10 3x10 3x10 3x10 3x10 3x10   Prone knee flexion  10-30  Degrees  10x         Gastroc stretch with step standing 10"x10 10" x 10 10"x10 20"x5 30"x5 30"x5 NP    Prone hang      2# x5' 4# 5' 4# 5'   Heel lift / mini squats x15 ea X 15 ea 2x10 2x10 3x10 ea 3x10 ea 3x10 ea 3x10 ea   Gait training  wbat 100' one crutch 5'        SLS    fatigue  fatigue     Extended knee flex    EOT BTB 6' EOT BTB x6' EOT #3 PTB x7' 8' EOT MTB 8'   sidestepping      3x Modalities

## 2019-07-30 ENCOUNTER — HOSPITAL ENCOUNTER (OUTPATIENT)
Dept: RADIOLOGY | Facility: HOSPITAL | Age: 29
Discharge: HOME | End: 2019-07-30
Attending: ORTHOPAEDIC SURGERY
Payer: COMMERCIAL

## 2019-07-30 ENCOUNTER — OFFICE VISIT (OUTPATIENT)
Dept: OBGYN CLINIC | Facility: HOSPITAL | Age: 29
End: 2019-07-30

## 2019-07-30 VITALS
BODY MASS INDEX: 22.5 KG/M2 | DIASTOLIC BLOOD PRESSURE: 91 MMHG | HEART RATE: 75 BPM | HEIGHT: 66 IN | WEIGHT: 140 LBS | SYSTOLIC BLOOD PRESSURE: 147 MMHG

## 2019-07-30 DIAGNOSIS — Z98.890 S/P ORIF (OPEN REDUCTION INTERNAL FIXATION) FRACTURE: ICD-10-CM

## 2019-07-30 DIAGNOSIS — Z87.81 S/P ORIF (OPEN REDUCTION INTERNAL FIXATION) FRACTURE: ICD-10-CM

## 2019-07-30 DIAGNOSIS — M00.062 STAPHYLOCOCCAL ARTHRITIS OF LEFT KNEE (HCC): ICD-10-CM

## 2019-07-30 DIAGNOSIS — M24.562 CONTRACTURE OF LEFT KNEE: Primary | ICD-10-CM

## 2019-07-30 DIAGNOSIS — S72.002A CLOSED FRACTURE OF NECK OF LEFT FEMUR, INITIAL ENCOUNTER (HCC): ICD-10-CM

## 2019-07-30 PROCEDURE — 73552 X-RAY EXAM OF FEMUR 2/>: CPT

## 2019-07-30 PROCEDURE — 73560 X-RAY EXAM OF KNEE 1 OR 2: CPT

## 2019-07-30 PROCEDURE — 99024 POSTOP FOLLOW-UP VISIT: CPT | Performed by: ORTHOPAEDIC SURGERY

## 2019-07-30 RX ORDER — CHLORHEXIDINE GLUCONATE 0.12 MG/ML
15 RINSE ORAL ONCE
Status: CANCELLED | OUTPATIENT
Start: 2019-07-30 | End: 2019-07-30

## 2019-07-30 RX ORDER — HYDROCODONE BITARTRATE AND ACETAMINOPHEN 5; 325 MG/1; MG/1
1 TABLET ORAL EVERY 4 HOURS PRN
COMMUNITY
End: 2019-08-02 | Stop reason: HOSPADM

## 2019-07-30 NOTE — H&P (VIEW-ONLY)
Assessment:    Posttraumatic left knee contracture    Plan:     Weight Bearing  as Tolerated   Patient to be scheduled for left knee manipulation under anesthesia   Pros and cons of operative and non operative intervention were explained to the patient  Summary complications include infection, bleeding, scarring, nerve injury, vascular injury, continued pain, decreased range of motion, DVT, PE, death, loss of limb, not observe any result patient wished  We will follow up postoperatively   Continue with current pain regimen    Continue with physical therapy    Follow up PO          The above stated was discussed in layman's terms and the patient expressed understanding  All questions were answered to the patient's satisfaction  Subjective:   Enid Stacy is a 34 y o  male who presents roughly 5 months postoperative visit status post left Femoral shaft fracture fixation left hip fracture cannulated screw fixation left patella fracture ORIF and left tibial plateau fracture ORIF  Patient is also 3 months s/p from the left 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 on 5/13/2019  Paitent states over all he is doing well  He is currently in physical therapy and telling the sessions well  He is notes some pain in the lateral aspect of left thigh  He is taking IBU, Robaxain and Gabapentin prn for pain  Denies any numbness or tingling         Review of systems negative unless otherwise specified in HPI    Past Medical History:   Diagnosis Date    Asthma     Environmental allergies     High cholesterol        Past Surgical History:   Procedure Laterality Date    EYE SURGERY      ORIF TIBIAL PLATEAU Left 5/7/3740    Procedure: OPEN REDUCTION W/ INTERNAL FIXATION (ORIF) TIBIAL PLATEAU, LEFT;  Surgeon: Miguel Charles MD;  [facility];  Service: Orthopedics    [facility] OPEN RX FEMUR FX+INTRAMED MALU Left 3/4/2019    Procedure: INSERTION NAIL IM FEMUR RETROGRADE, LEFT FEMUR;  Surgeon: Miguel Charles MD;  [facility];  Service: Orthopedics    [place] OPEN RX PATELLA FX Left 3/4/2019    Procedure: OPEN REDUCTION W/ INTERNAL FIXATION (ORIF) PATELLA, LEFT;  Surgeon: Ngozi Valdes MD;  [facility];  Service: Orthopedics    TONSILLECTOMY      WOUND DEBRIDEMENT Left 3/4/2019    Procedure: DEBRIDEMENT WOUND ([address]), LEFT KNEE TRAUMATIC ARTHROTOMY;  Surgeon: Ngozi Valdes MD;  [facility];  Service: Orthopedics    WOUND DEBRIDEMENT Left 5/10/2019    Procedure: DEBRIDEMENT LOWER EXTREMITY ([address]), L knee;  Surgeon: Ngozi Valdes MD;  [facility];  Service: Orthopedics       Family History   Problem Relation Age of Onset    Thyroid disease Mother     Arthritis Mother     Diabetes Father        Social History     Occupational History    Not on file   Tobacco Use    Smoking status: Never Smoker    Smokeless tobacco: Never Used   Substance and Sexual Activity    Alcohol use: Not Currently    Drug use: Never    Sexual activity: Not on file         Current Outpatient Medications:     gabapentin (NEURONTIN) 100 mg capsule, Take 2 capsules (200 mg total) by mouth 3 (three) times a day, Disp: 90 capsule, Rfl: 0    HYDROcodone-acetaminophen (NORCO) 5-325 mg per tablet, Take 1 tablet by mouth as needed for pain, Disp: , Rfl:     ibuprofen (MOTRIN) 600 mg tablet, Take 1 tablet (600 mg total) by mouth every 8 (eight) hours as needed for mild pain, Disp: 30 tablet, Rfl: 0    methocarbamol (ROBAXIN) 750 mg tablet, Take 1 tablet (750 mg total) by mouth every 6 (six) hours as needed for muscle spasms for up to 30 doses, Disp: 120 tablet, Rfl: 0    montelukast (SINGULAIR) 10 mg tablet, Take 10 mg by mouth daily, Disp: , Rfl: 3    polyethylene glycol (MIRALAX) 17 g packet, Take 17 g by mouth daily, Disp: 14 each, Rfl: 0    docusate sodium (COLACE) 100 mg capsule, Take 1 capsule (100 mg total) by mouth every 12 (twelve) hours (Patient not taking: Reported on 7/30/2019), Disp: 60 capsule, Rfl: 0    Allergies   Allergen Reactions    Vancomycin Rash     Rash starting on face down to the abdomen             Vitals:    07/30/19 1011   BP: 147/91   Pulse: 75       Objective:            Physical Exam  · General: Awake, Alert, Oriented  · Eyes: Pupils equal, round and reactive to light  · Heart: regular rate and rhythm  · Lungs: No audible wheezing  · Abdomen: soft                    Ortho Exam  Left knee   Incisions sites well healed   Prominence noted  over lateral thigh no tenderness(close femoral shaft fracture)  Near full extension lacks less than 5 degrees near full ,   Flexion 55 degrees , flexion contracture  No TTP over medial lateral joint line   Patella slightly mobile  Neurovascularly Intact Distally     Diagnostics, reviewed and taken today if performed as documented: The attending physician has personally reviewed the pertinent films in PACS and interpretation is as follows:  XR Left knee: s/p ORIF  Hardware intact and well aligned, some arthritic changes noted over the medial and lateral femoral condyles (patient with history of septic arthritis)  XR Left femur:  Hardware intact and well aligned callus formation over fracture    Procedures, if performed today:    Procedures    None performed      Scribe Attestation    I,:   Marisela Jay am acting as a scribe while in the presence of the attending physician :        I,:   Tono Russo MD personally performed the services described in this documentation    as scribed in my presence :              Portions of the record may have been created with voice recognition software  Occasional wrong word or "sound a like" substitutions may have occurred due to the inherent limitations of voice recognition software  Read the chart carefully and recognize, using context, where substitutions have occurred

## 2019-07-30 NOTE — PROGRESS NOTES
Assessment:    Posttraumatic left knee contracture    Plan:     Weight Bearing  as Tolerated   Patient to be scheduled for left knee manipulation under anesthesia   Pros and cons of operative and non operative intervention were explained to the patient  Summary complications include infection, bleeding, scarring, nerve injury, vascular injury, continued pain, decreased range of motion, DVT, PE, death, loss of limb, not observe any result patient wished  We will follow up postoperatively   Continue with current pain regimen    Continue with physical therapy    Follow up PO          The above stated was discussed in layman's terms and the patient expressed understanding  All questions were answered to the patient's satisfaction  Subjective:   Aubree Turner is a 34 y o  male who presents roughly 5 months postoperative visit status post left Femoral shaft fracture fixation left hip fracture cannulated screw fixation left patella fracture ORIF and left tibial plateau fracture ORIF  Patient is also 3 months s/p from the left 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 on 5/13/2019  Paitent states over all he is doing well  He is currently in physical therapy and telling the sessions well  He is notes some pain in the lateral aspect of left thigh  He is taking IBU, Robaxain and Gabapentin prn for pain  Denies any numbness or tingling         Review of systems negative unless otherwise specified in HPI    Past Medical History:   Diagnosis Date    Asthma     Environmental allergies     High cholesterol        Past Surgical History:   Procedure Laterality Date    EYE SURGERY      ORIF TIBIAL PLATEAU Left 6/7/8242    Procedure: OPEN REDUCTION W/ INTERNAL FIXATION (ORIF) TIBIAL PLATEAU, LEFT;  Surgeon: Maggie Barksdale MD;  [facility];  Service: Orthopedics    [place] OPEN RX FEMUR FX+INTRAMED MALU Left 3/4/2019    Procedure: INSERTION NAIL IM FEMUR RETROGRADE, LEFT FEMUR;  Surgeon: Maggie Barksdale MD;  [facility];  Service: Orthopedics    [place] OPEN RX PATELLA FX Left 3/4/2019    Procedure: OPEN REDUCTION W/ INTERNAL FIXATION (ORIF) PATELLA, LEFT;  Surgeon: Diomedes Turner MD;  [facility];  Service: Orthopedics    TONSILLECTOMY      WOUND DEBRIDEMENT Left 3/4/2019    Procedure: DEBRIDEMENT WOUND ([address]), LEFT KNEE TRAUMATIC ARTHROTOMY;  Surgeon: Diomedes Turner MD;  [facility];  Service: Orthopedics    WOUND DEBRIDEMENT Left 5/10/2019    Procedure: DEBRIDEMENT LOWER EXTREMITY ([address]), L knee;  Surgeon: Diomedes Turner MD;  [facility];  Service: Orthopedics       Family History   Problem Relation Age of Onset    Thyroid disease Mother     Arthritis Mother     Diabetes Father        Social History     Occupational History    Not on file   Tobacco Use    Smoking status: Never Smoker    Smokeless tobacco: Never Used   Substance and Sexual Activity    Alcohol use: Not Currently    Drug use: Never    Sexual activity: Not on file         Current Outpatient Medications:     gabapentin (NEURONTIN) 100 mg capsule, Take 2 capsules (200 mg total) by mouth 3 (three) times a day, Disp: 90 capsule, Rfl: 0    HYDROcodone-acetaminophen (NORCO) 5-325 mg per tablet, Take 1 tablet by mouth as needed for pain, Disp: , Rfl:     ibuprofen (MOTRIN) 600 mg tablet, Take 1 tablet (600 mg total) by mouth every 8 (eight) hours as needed for mild pain, Disp: 30 tablet, Rfl: 0    methocarbamol (ROBAXIN) 750 mg tablet, Take 1 tablet (750 mg total) by mouth every 6 (six) hours as needed for muscle spasms for up to 30 doses, Disp: 120 tablet, Rfl: 0    montelukast (SINGULAIR) 10 mg tablet, Take 10 mg by mouth daily, Disp: , Rfl: 3    polyethylene glycol (MIRALAX) 17 g packet, Take 17 g by mouth daily, Disp: 14 each, Rfl: 0    docusate sodium (COLACE) 100 mg capsule, Take 1 capsule (100 mg total) by mouth every 12 (twelve) hours (Patient not taking: Reported on 7/30/2019), Disp: 60 capsule, Rfl: 0    Allergies   Allergen Reactions    Vancomycin Rash     Rash starting on face down to the abdomen             Vitals:    07/30/19 1011   BP: 147/91   Pulse: 75       Objective:            Physical Exam  · General: Awake, Alert, Oriented  · Eyes: Pupils equal, round and reactive to light  · Heart: regular rate and rhythm  · Lungs: No audible wheezing  · Abdomen: soft                    Ortho Exam  Left knee   Incisions sites well healed   Prominence noted  over lateral thigh no tenderness(close femoral shaft fracture)  Near full extension lacks less than 5 degrees near full ,   Flexion 55 degrees , flexion contracture  No TTP over medial lateral joint line   Patella slightly mobile  Neurovascularly Intact Distally     Diagnostics, reviewed and taken today if performed as documented: The attending physician has personally reviewed the pertinent films in PACS and interpretation is as follows:  XR Left knee: s/p ORIF  Hardware intact and well aligned, some arthritic changes noted over the medial and lateral femoral condyles (patient with history of septic arthritis)  XR Left femur:  Hardware intact and well aligned callus formation over fracture    Procedures, if performed today:    Procedures    None performed      Scribe Attestation    I,:   Nina Jay am acting as a scribe while in the presence of the attending physician :        I,:   Alexis Barragan MD personally performed the services described in this documentation    as scribed in my presence :              Portions of the record may have been created with voice recognition software  Occasional wrong word or "sound a like" substitutions may have occurred due to the inherent limitations of voice recognition software  Read the chart carefully and recognize, using context, where substitutions have occurred

## 2019-07-31 ENCOUNTER — OFFICE VISIT (OUTPATIENT)
Dept: PHYSICAL THERAPY | Facility: REHABILITATION | Age: 29
End: 2019-07-31
Payer: COMMERCIAL

## 2019-07-31 DIAGNOSIS — Z98.890 STATUS POST SURGERY: Primary | ICD-10-CM

## 2019-07-31 PROCEDURE — 97110 THERAPEUTIC EXERCISES: CPT

## 2019-07-31 PROCEDURE — 97140 MANUAL THERAPY 1/> REGIONS: CPT

## 2019-07-31 NOTE — PROGRESS NOTES
Daily Note     Today's date: 2019  Patient name: Alexa Bailey  : 1990  MRN: [id number]  Referring provider: Michelle Dykes PA-C  Dx:   Encounter Diagnosis     ICD-10-CM    1  Status post surgery Z98 890                   Subjective: Patient reports that he is scheduled to have a manipulation on 19  Objective: See treatment diary below    Precautions: WBAT      Manual  7/17 7/22 x10' 7/24 x10' 7/26 7/29 7/31 x10'   Prone knee flexion st HS HS HS MACHO 10' nv    Seated knee flex LEFT      HS   Supine knee ext st HS HS HS MACHO 5' nv HS   Scar massage/mobs HS ISTM                     Exercise Diary     Nu Step 10' bike 10' Nu  10' Nu 10' 10' Nu 10' Nu   Quad sets 5" 3x10 5" 3x10 5" 2x10 5" 2x10 20x5" 20x5"   SAQ         SLR - flex 3x10 3x10 3x10 3x10 3x10 3x10   Prone knee flexion         Gastroc stretch with step standing 20"x5 30"x5 30"x5 NP  30"x5   Prone hang   2# x5' 4# 5' 4# 5'    Heel lift / mini squats 2x10 3x10 ea 3x10 ea 3x10 ea 3x10 ea 3x10 ea   SLS fatigue  fatigue      Extended knee flex EOT BTB 6' EOT BTB x6' EOT #3 PTB x7' 8' EOT MTB 8' EOT MTB 8'   sidestepping   3x                                                                                  Modalities                                         Assessment: Pt was educated on the importance of HEP and PT compliance post manip  Plan: Continue per plan of care  Resume manual, as tolerated

## 2019-07-31 NOTE — PRE-PROCEDURE INSTRUCTIONS
Pre-Surgery Instructions:   Medication Instructions    docusate sodium (COLACE) 100 mg capsule Instructed patient per Anesthesia Guidelines   gabapentin (NEURONTIN) 100 mg capsule Instructed patient per Anesthesia Guidelines   HYDROcodone-acetaminophen (NORCO) 5-325 mg per tablet Instructed patient per Anesthesia Guidelines   ibuprofen (MOTRIN) 600 mg tablet Instructed patient per Anesthesia Guidelines   methocarbamol (ROBAXIN) 750 mg tablet Instructed patient per Anesthesia Guidelines   montelukast (SINGULAIR) 10 mg tablet Instructed patient per Anesthesia Guidelines  Pre op instructions reviewed; verbalized understanding  Acetaminophen Med Class     Continue to take this medication on your normal schedule  If this is an oral medication and you take it in the morning, then you may take this medicine with a sip of water  Antiepileptic Med Class     Continue to take this medication on your normal schedule  If this is an oral medication and you take it in the morning, then you may take this medicine with a sip of water  Leukotriene Inhibitor Med Class     Continue to take this medication on your normal schedule  If this is an oral medication and you take it in the morning, then you may take this medicine with a sip of water  NSAID Med Class     Stop taking this medication at least 3 days prior to surgery/procedure  Opioid Med Class     Continue to take this medication on your normal schedule  If this is an oral medication and you take it in the morning, then you may take this medicine with a sip of water  Stool Softener Med Class     Continue to take this medication on your normal schedule  If this is an oral medication and you take it in the morning, then you may take this medicine with a sip of water

## 2019-08-01 ENCOUNTER — ANESTHESIA EVENT (OUTPATIENT)
Dept: PERIOP | Facility: HOSPITAL | Age: 29
End: 2019-08-01
Payer: COMMERCIAL

## 2019-08-01 NOTE — ANESTHESIA PREPROCEDURE EVALUATION
Review of Systems/Medical History  Patient summary reviewed  Chart reviewed  No history of anesthetic complications     Cardiovascular  Exercise tolerance (METS): >4,  Hyperlipidemia, No PALMA,    Pulmonary  Asthma Last rescue: > 1 year ago , No recent URI ,        GI/Hepatic    No GERD , Chronic liver disease,        Negative  ROS        Endo/Other    Comment: Chronic tinnitus    GYN       Hematology  Anemia ,     Musculoskeletal       Neurology  Negative neurology ROS      Psychology   Negative psychology ROS              Physical Exam    Airway    Mallampati score: III  TM Distance: >3 FB  Neck ROM: full     Dental   Comment: Blood in mouth, small abrasions on tongue  Pt denies any dental injuries, no loose teeth,     Cardiovascular  Rhythm: regular, Rate: normal, Cardiovascular exam normal    Pulmonary  Pulmonary exam normal Breath sounds clear to auscultation,     Other Findings       Lab Results   Component Value Date    WBC 8 45 07/03/2019    HGB 13 8 07/03/2019     07/03/2019     Lab Results   Component Value Date    K 3 9 07/03/2019    BUN 8 07/03/2019    CREATININE 0 67 07/03/2019    GLUCOSE 143 (H) 03/04/2019     Blood type A+/antibody neg  Anesthesia Plan  ASA Score- 2     Anesthesia Type- general with ASA Monitors  Additional Monitors:   Airway Plan:         Plan Factors-    Induction- intravenous  Postoperative Plan- Plan for postoperative opioid use  Planned trial extubation    Informed Consent- Anesthetic plan and risks discussed with patient  I personally reviewed this patient with the CRNA  Discussed and agreed on the Anesthesia Plan with the CRNA  Rachana Galindo

## 2019-08-02 ENCOUNTER — ANESTHESIA (OUTPATIENT)
Dept: PERIOP | Facility: HOSPITAL | Age: 29
End: 2019-08-02
Payer: COMMERCIAL

## 2019-08-02 ENCOUNTER — HOSPITAL ENCOUNTER (OUTPATIENT)
Facility: HOSPITAL | Age: 29
Setting detail: OUTPATIENT SURGERY
Discharge: HOME | End: 2019-08-02
Attending: ORTHOPAEDIC SURGERY | Admitting: ORTHOPAEDIC SURGERY
Payer: COMMERCIAL

## 2019-08-02 ENCOUNTER — APPOINTMENT (OUTPATIENT)
Dept: PHYSICAL THERAPY | Facility: REHABILITATION | Age: 29
End: 2019-08-02
Payer: COMMERCIAL

## 2019-08-02 VITALS
DIASTOLIC BLOOD PRESSURE: 58 MMHG | OXYGEN SATURATION: 98 % | HEART RATE: 69 BPM | BODY MASS INDEX: 25.49 KG/M2 | SYSTOLIC BLOOD PRESSURE: 135 MMHG | WEIGHT: 153 LBS | HEIGHT: 65 IN | TEMPERATURE: 95.9 F | RESPIRATION RATE: 17 BRPM

## 2019-08-02 DIAGNOSIS — Z98.890 STATUS POST SURGERY: Primary | ICD-10-CM

## 2019-08-02 PROCEDURE — 27570 FIXATION OF KNEE JOINT: CPT | Performed by: ORTHOPAEDIC SURGERY

## 2019-08-02 RX ORDER — ONDANSETRON 2 MG/ML
4 INJECTION INTRAMUSCULAR; INTRAVENOUS EVERY 6 HOURS PRN
Status: DISCONTINUED | OUTPATIENT
Start: 2019-08-02 | End: 2019-08-02 | Stop reason: HOSPADM

## 2019-08-02 RX ORDER — SODIUM CHLORIDE, SODIUM LACTATE, POTASSIUM CHLORIDE, CALCIUM CHLORIDE 600; 310; 30; 20 MG/100ML; MG/100ML; MG/100ML; MG/100ML
INJECTION, SOLUTION INTRAVENOUS CONTINUOUS PRN
Status: DISCONTINUED | OUTPATIENT
Start: 2019-08-02 | End: 2019-08-02 | Stop reason: SURG

## 2019-08-02 RX ORDER — SUCCINYLCHOLINE/SOD CL,ISO/PF 100 MG/5ML
SYRINGE (ML) INTRAVENOUS AS NEEDED
Status: DISCONTINUED | OUTPATIENT
Start: 2019-08-02 | End: 2019-08-02 | Stop reason: SURG

## 2019-08-02 RX ORDER — HYDROCODONE BITARTRATE AND ACETAMINOPHEN 5; 325 MG/1; MG/1
1 TABLET ORAL EVERY 6 HOURS PRN
Status: DISCONTINUED | OUTPATIENT
Start: 2019-08-02 | End: 2019-08-02 | Stop reason: HOSPADM

## 2019-08-02 RX ORDER — ACETAMINOPHEN 325 MG/1
975 TABLET ORAL EVERY 8 HOURS
Status: DISCONTINUED | OUTPATIENT
Start: 2019-08-02 | End: 2019-08-02 | Stop reason: HOSPADM

## 2019-08-02 RX ORDER — ROCURONIUM BROMIDE 10 MG/ML
INJECTION, SOLUTION INTRAVENOUS AS NEEDED
Status: DISCONTINUED | OUTPATIENT
Start: 2019-08-02 | End: 2019-08-02 | Stop reason: SURG

## 2019-08-02 RX ORDER — GLYCOPYRROLATE 0.2 MG/ML
INJECTION INTRAMUSCULAR; INTRAVENOUS AS NEEDED
Status: DISCONTINUED | OUTPATIENT
Start: 2019-08-02 | End: 2019-08-02 | Stop reason: SURG

## 2019-08-02 RX ORDER — HYDROMORPHONE HCL/PF 1 MG/ML
0.4 SYRINGE (ML) INJECTION
Status: DISCONTINUED | OUTPATIENT
Start: 2019-08-02 | End: 2019-08-02 | Stop reason: HOSPADM

## 2019-08-02 RX ORDER — FENTANYL CITRATE 50 UG/ML
INJECTION, SOLUTION INTRAMUSCULAR; INTRAVENOUS AS NEEDED
Status: DISCONTINUED | OUTPATIENT
Start: 2019-08-02 | End: 2019-08-02 | Stop reason: SURG

## 2019-08-02 RX ORDER — ONDANSETRON 2 MG/ML
4 INJECTION INTRAMUSCULAR; INTRAVENOUS ONCE AS NEEDED
Status: DISCONTINUED | OUTPATIENT
Start: 2019-08-02 | End: 2019-08-02 | Stop reason: HOSPADM

## 2019-08-02 RX ORDER — FENTANYL CITRATE/PF 50 MCG/ML
50 SYRINGE (ML) INJECTION
Status: COMPLETED | OUTPATIENT
Start: 2019-08-02 | End: 2019-08-02

## 2019-08-02 RX ORDER — LIDOCAINE HYDROCHLORIDE 10 MG/ML
INJECTION, SOLUTION INFILTRATION; PERINEURAL AS NEEDED
Status: DISCONTINUED | OUTPATIENT
Start: 2019-08-02 | End: 2019-08-02 | Stop reason: SURG

## 2019-08-02 RX ORDER — KETOROLAC TROMETHAMINE 30 MG/ML
INJECTION, SOLUTION INTRAMUSCULAR; INTRAVENOUS AS NEEDED
Status: DISCONTINUED | OUTPATIENT
Start: 2019-08-02 | End: 2019-08-02 | Stop reason: SURG

## 2019-08-02 RX ORDER — HYDROCODONE BITARTRATE AND ACETAMINOPHEN 5; 325 MG/1; MG/1
1 TABLET ORAL EVERY 6 HOURS PRN
Qty: 10 TABLET | Refills: 0 | Status: SHIPPED | OUTPATIENT
Start: 2019-08-02 | End: 2019-08-12

## 2019-08-02 RX ORDER — ROPIVACAINE HYDROCHLORIDE 5 MG/ML
INJECTION, SOLUTION EPIDURAL; INFILTRATION; PERINEURAL
Status: DISCONTINUED | OUTPATIENT
Start: 2019-08-02 | End: 2019-08-02 | Stop reason: SURG

## 2019-08-02 RX ORDER — PROPOFOL 10 MG/ML
INJECTION, EMULSION INTRAVENOUS AS NEEDED
Status: DISCONTINUED | OUTPATIENT
Start: 2019-08-02 | End: 2019-08-02 | Stop reason: SURG

## 2019-08-02 RX ORDER — LIDOCAINE HYDROCHLORIDE 10 MG/ML
INJECTION, SOLUTION EPIDURAL; INFILTRATION; INTRACAUDAL; PERINEURAL
Status: DISCONTINUED
Start: 2019-08-02 | End: 2019-08-02 | Stop reason: HOSPADM

## 2019-08-02 RX ORDER — ONDANSETRON 2 MG/ML
INJECTION INTRAMUSCULAR; INTRAVENOUS AS NEEDED
Status: DISCONTINUED | OUTPATIENT
Start: 2019-08-02 | End: 2019-08-02 | Stop reason: SURG

## 2019-08-02 RX ORDER — ONDANSETRON 4 MG/1
4 TABLET, ORALLY DISINTEGRATING ORAL EVERY 6 HOURS PRN
Status: DISCONTINUED | OUTPATIENT
Start: 2019-08-02 | End: 2019-08-02 | Stop reason: HOSPADM

## 2019-08-02 RX ORDER — NEOSTIGMINE METHYLSULFATE 1 MG/ML
INJECTION INTRAVENOUS AS NEEDED
Status: DISCONTINUED | OUTPATIENT
Start: 2019-08-02 | End: 2019-08-02 | Stop reason: SURG

## 2019-08-02 RX ORDER — MIDAZOLAM HYDROCHLORIDE 1 MG/ML
INJECTION INTRAMUSCULAR; INTRAVENOUS AS NEEDED
Status: DISCONTINUED | OUTPATIENT
Start: 2019-08-02 | End: 2019-08-02 | Stop reason: SURG

## 2019-08-02 RX ORDER — DEXAMETHASONE SODIUM PHOSPHATE 10 MG/ML
INJECTION, SOLUTION INTRAMUSCULAR; INTRAVENOUS AS NEEDED
Status: DISCONTINUED | OUTPATIENT
Start: 2019-08-02 | End: 2019-08-02 | Stop reason: SURG

## 2019-08-02 RX ORDER — OXYCODONE HYDROCHLORIDE 5 MG/1
5 TABLET ORAL EVERY 4 HOURS PRN
Status: DISCONTINUED | OUTPATIENT
Start: 2019-08-02 | End: 2019-08-02

## 2019-08-02 RX ADMIN — LIDOCAINE HYDROCHLORIDE 100 MG: 10 INJECTION, SOLUTION INFILTRATION; PERINEURAL at 08:19

## 2019-08-02 RX ADMIN — KETOROLAC TROMETHAMINE 30 MG: 30 INJECTION, SOLUTION INTRAMUSCULAR at 08:44

## 2019-08-02 RX ADMIN — SODIUM CHLORIDE, SODIUM LACTATE, POTASSIUM CHLORIDE, AND CALCIUM CHLORIDE: .6; .31; .03; .02 INJECTION, SOLUTION INTRAVENOUS at 09:35

## 2019-08-02 RX ADMIN — FENTANYL CITRATE 50 MCG: 50 INJECTION, SOLUTION INTRAMUSCULAR; INTRAVENOUS at 08:25

## 2019-08-02 RX ADMIN — HYDROMORPHONE HYDROCHLORIDE 0.4 MG: 1 INJECTION, SOLUTION INTRAMUSCULAR; INTRAVENOUS; SUBCUTANEOUS at 09:47

## 2019-08-02 RX ADMIN — NEOSTIGMINE METHYLSULFATE 4 MG: 1 INJECTION, SOLUTION INTRAVENOUS at 08:56

## 2019-08-02 RX ADMIN — ONDANSETRON 4 MG: 2 INJECTION INTRAMUSCULAR; INTRAVENOUS at 08:39

## 2019-08-02 RX ADMIN — ROPIVACAINE HYDROCHLORIDE 20 ML: 5 INJECTION, SOLUTION EPIDURAL; INFILTRATION; PERINEURAL at 08:58

## 2019-08-02 RX ADMIN — HYDROMORPHONE HYDROCHLORIDE 0.4 MG: 1 INJECTION, SOLUTION INTRAMUSCULAR; INTRAVENOUS; SUBCUTANEOUS at 09:54

## 2019-08-02 RX ADMIN — MIDAZOLAM 2 MG: 1 INJECTION INTRAMUSCULAR; INTRAVENOUS at 08:08

## 2019-08-02 RX ADMIN — ROCURONIUM BROMIDE 20 MG: 10 INJECTION INTRAVENOUS at 08:35

## 2019-08-02 RX ADMIN — FENTANYL CITRATE 50 MCG: 50 INJECTION INTRAMUSCULAR; INTRAVENOUS at 09:37

## 2019-08-02 RX ADMIN — Medication 100 MG: at 08:23

## 2019-08-02 RX ADMIN — GLYCOPYRROLATE 0.6 MG: 0.2 INJECTION, SOLUTION INTRAMUSCULAR; INTRAVENOUS at 08:56

## 2019-08-02 RX ADMIN — FENTANYL CITRATE 50 MCG: 50 INJECTION, SOLUTION INTRAMUSCULAR; INTRAVENOUS at 08:22

## 2019-08-02 RX ADMIN — ONDANSETRON 4 MG: 4 TABLET, ORALLY DISINTEGRATING ORAL at 12:29

## 2019-08-02 RX ADMIN — SODIUM CHLORIDE, SODIUM LACTATE, POTASSIUM CHLORIDE, AND CALCIUM CHLORIDE: .6; .31; .03; .02 INJECTION, SOLUTION INTRAVENOUS at 08:08

## 2019-08-02 RX ADMIN — PROPOFOL 200 MG: 10 INJECTION, EMULSION INTRAVENOUS at 08:19

## 2019-08-02 RX ADMIN — FENTANYL CITRATE 50 MCG: 50 INJECTION INTRAMUSCULAR; INTRAVENOUS at 09:26

## 2019-08-02 RX ADMIN — DEXAMETHASONE SODIUM PHOSPHATE 10 MG: 10 INJECTION, SOLUTION INTRAMUSCULAR; INTRAVENOUS at 08:39

## 2019-08-02 RX ADMIN — HYDROCODONE BITARTRATE AND ACETAMINOPHEN 1 TABLET: 5; 325 TABLET ORAL at 10:47

## 2019-08-02 NOTE — ANESTHESIA POSTPROCEDURE EVALUATION
Post-Op Assessment Note    CV Status:  Stable    Pain management: adequate     Mental Status:  Sleepy   Hydration Status:  Stable   PONV Controlled:  Controlled   Airway Patency:  Patent   Post Op Vitals Reviewed: Yes      Staff: Anesthesiologist, CRNA           BP   107/45   Temp   97 4   Pulse  72   Resp   18   SpO2   100

## 2019-08-02 NOTE — OP NOTE
Op Note- Orthopedics   Doc Blankenship  MRN: [id number]      Unit/Bed#:       PreOp Dx:  Posttraumatic left knee contracture     Postop Dx:  Same as preoperative diagnosis    Procedure:  Manipulation under anesthesia left knee    Surgeon: Roshan Gaytan    Fluids:     EBL:     Drains:  None    Specimens:  None    Complications:  None    Condition:  Stable and transferred to postanesthesia care unit    Implant:  None    61-year-old male presents at this time status postoperative treatment of left femur fracture, tibial plateau fracture, and patella fracture, which was complicated by MRSA infection, who presents at this time secondary to limited range of motion of about 10-50 degrees  Patient presenting at this time for manipulation under anesthesia of the left knee  Patient has notable pros and cons of operative and non operative intervention  Summary complications of operative intervention include infection, bleeding, scarring, nerve injury, vascular injury, continued pain, decreased range of motion, DVT, PE, death, loss of limb, we fracture, not obtain result patient wished  Patient understood the pros and cons of operative and non operative intervention and consented for operative intervention for his pathology  Patient was brought to the operating room, patient was anesthetized as per anesthesia team   Was patient was anesthetized, we did conduct a time-out to ensure correct patient was in the room, correct site was marked  Antibiotics and DVT prophylaxis was addressed  Was patient received muscle axis from the anesthesia team, we able to manipulate the knee  Patient was able to obtain full extension, the patient was able to be flexed to about 130°  There was no swelling noted after manipulation  Patient was then subsequently placed in knee brace  Patient was awakened as per anesthesia team, noted to be stable condition, transferred to postanesthesia care unit range of motion is 0-130 degrees    Patient did receive a abductor nerve block prior to being awakened

## 2019-08-02 NOTE — ANESTHESIA PROCEDURE NOTES
Peripheral Block    Patient location during procedure: OR  Start time: 8/2/2019 8:58 AM  Reason for block: procedure for pain, at surgeon's request and post-op pain management  Staffing  Anesthesiologist: Suellyn Landau, MD  Performed: anesthesiologist   Preanesthetic Checklist  Completed: patient identified, site marked, surgical consent, pre-op evaluation, timeout performed, IV checked, risks and benefits discussed and monitors and equipment checked  Peripheral Block  Patient position: supine  Prep: ChloraPrep and site prepped and draped  Patient monitoring: heart rate, continuous pulse ox, frequent blood pressure checks and cardiac monitor  Block type: adductor canal block  Laterality: left  Injection technique: single-shot  Procedures: ultrasound guided, Ultrasound guidance required for the procedure to increase accuracy and safety of medication placement and decrease risk of complications    Ultrasound permanent image savedropivacaine (NAROPIN) 0 5 % perineural infiltration, 20 mL  Needle  Needle type: Stimuplex   Needle gauge: 27 G  Needle length: 10 cm  Needle localization: ultrasound guidance  Test dose: negative  Assessment  Injection assessment: incremental injection, local visualized surrounding nerve on ultrasound, negative aspiration for CSF, negative aspiration for heme and no paresthesia on injection  Paresthesia pain: none  Heart rate change: no  Slow fractionated injection: yes  Post-procedure:  site cleaned  patient tolerated the procedure well with no immediate complications

## 2019-08-02 NOTE — DISCHARGE INSTRUCTIONS
Discharge Instructions - Orthopedics  Dolores Moyer 34 y o  male MRN: [id number]  Unit/Bed#: PACU 04    Weight Bearing Status:                                           Weightbearing as tolerated left lower extremity    Pain:  Continue analgesics as directed    Dressing Instructions:   Hinged knee brace for comfort     PT/OT:  Continue PT/OT on outpatient basis as directed    Appt Instructions: If you do not have your appointment, please call the clinic at [phone] to f/u with Dr Davion Bundy in 2 weeks  Otherwise followup as scheduled    Contact the office sooner if you experience any increased numbness/tingling in the extremities        Miscellaneous:  None

## 2019-08-02 NOTE — INTERVAL H&P NOTE
H&P reviewed  After examining the patient I find no changes in the patients condition since the H&P had been written  Blood pressure 140/79, pulse 77, temperature 97 9 °F (36 6 °C), temperature source Tympanic, resp  rate 18, height 5' 5" (1 651 m), weight 69 4 kg (153 lb), SpO2 100 %  To OR for manipulation of left knee secondary to posttraumatic left knee contracture  We will follow up with patient postoperatively

## 2019-08-05 ENCOUNTER — EVALUATION (OUTPATIENT)
Dept: PHYSICAL THERAPY | Facility: REHABILITATION | Age: 29
End: 2019-08-05
Payer: COMMERCIAL

## 2019-08-05 DIAGNOSIS — Z98.890 STATUS POST SURGERY: Primary | ICD-10-CM

## 2019-08-05 PROCEDURE — 97110 THERAPEUTIC EXERCISES: CPT

## 2019-08-05 PROCEDURE — 97140 MANUAL THERAPY 1/> REGIONS: CPT

## 2019-08-05 NOTE — PROGRESS NOTES
Daily Note     Today's date: 2019  Patient name: Estevan Muñoz  : 1990  MRN: [id number]  Referring provider: Ovidio Gallardo PA-C  Dx:   Encounter Diagnosis     ICD-10-CM    1  Status post surgery Z98 890                   Subjective: Patient had a manipulation on 19  Pt was educated on the importance of HEP compliance to maintain ROM  Objective: See treatment diary below    Precautions: WBAT      Manual  7/17 7/22 x10' 7/24 x10' 7/26 7/29 7/31 x10' 8/5 x50'   Prone knee flexion st HS HS HS MACHO 10' nv     Seated knee flex LEFT      HS HS   Supine knee ext st HS HS HS MACHO 5' nv HS HS   Scar massage/mobs HS ISTM                       Exercise Diary   8/5   Nu Step 10' bike 10' Nu  10' Nu 10' 10' Nu 10' Nu 10' bike   Quad sets 5" 3x10 5" 3x10 5" 2x10 5" 2x10 20x5" 20x5"    SAQ          SLR - flex 3x10 3x10 3x10 3x10 3x10 3x10    Prone knee flexion          Gastroc stretch with step standing 20"x5 30"x5 30"x5 NP  30"x5    Prone hang   2# x5' 4# 5' 4# 5'     Heel lift / mini squats 2x10 3x10 ea 3x10 ea 3x10 ea 3x10 ea 3x10 ea    SLS fatigue  fatigue       Extended knee flex EOT BTB 6' EOT BTB x6' EOT #3 PTB x7' 8' EOT MTB 8' EOT MTB 8' EOT belt(2)  7'+5' w/ man OP   sidestepping   3x                                                                                           Modalities                                         Assessment: majority of session consisted of PROM manual stretching with good tolerance  Plan: Continue per plan of care  Resume manual, as tolerated

## 2019-08-06 ENCOUNTER — OFFICE VISIT (OUTPATIENT)
Dept: PHYSICAL THERAPY | Facility: REHABILITATION | Age: 29
End: 2019-08-06
Payer: COMMERCIAL

## 2019-08-06 DIAGNOSIS — Z98.890 STATUS POST SURGERY: Primary | ICD-10-CM

## 2019-08-06 PROCEDURE — 97110 THERAPEUTIC EXERCISES: CPT | Performed by: PHYSICAL THERAPIST

## 2019-08-06 PROCEDURE — 97140 MANUAL THERAPY 1/> REGIONS: CPT | Performed by: PHYSICAL THERAPIST

## 2019-08-06 NOTE — PROGRESS NOTES
Daily Note     Today's date: 2019  Patient name: Gera Santiago  : 1990  MRN: [id number]  Referring provider: Janina Nguyen PA-C  Dx:   Encounter Diagnosis     ICD-10-CM    1  Status post surgery Z98 890                   Subjective: Patient states that he feels that he can move his leg further when trying to bend it, but he is in a lot more pain since his manipulation procedure  Objective: See treatment diary below  Manual  8/6x 40' 7/22 x10' 7/24 x10' 7/26 7/29 7/31 x10' 8/5 x50'   Prone knee flexion st  HS HS MACHO 10' nv     Seated knee flex LEFT SE     HS HS   Supine knee ext st SE HS HS MACHO 5' nv HS HS   Scar massage/mobs                        Exercise Diary   8   Nu Step 10' bike, 5' Nustep L 10 10' Nu  10' Nu 10' 10' Nu 10' Nu 10' bike   Quad sets  5" 3x10 5" 2x10 5" 2x10 20x5" 20x5"    SAQ          SLR - flex  3x10 3x10 3x10 3x10 3x10    Prone knee flexion          Gastroc stretch with step standing  30"x5 30"x5 NP  30"x5    Prone hang   2# x5' 4# 5' 4# 5'     Heel lift / mini squats  3x10 ea 3x10 ea 3x10 ea 3x10 ea 3x10 ea    SLS   fatigue       Extended knee flex EOT BTB 12' EOT BTB x6' EOT #3 PTB x7' 8' EOT MTB 8' EOT MTB 8' EOT belt(2)  7'+5' w/ man OP   sidestepping   3x                                                                                           Modalities                                         Assessment: Patient still has difficulty gaining adequate range of motion and is highly pain dominant  He does show eagerness to improve and good compliance with PT, but becomes apprehensive to move his leg when doing ROM exercises  He also avoids putting weight through his left lower extremity when standing as well as when ambulating  Would benefit from continued PT to address current deficits and to promote an improvement in quality of life  Plan: Continue per plan of care

## 2019-08-07 ENCOUNTER — OFFICE VISIT (OUTPATIENT)
Dept: PHYSICAL THERAPY | Facility: REHABILITATION | Age: 29
End: 2019-08-07
Payer: COMMERCIAL

## 2019-08-07 DIAGNOSIS — Z98.890 STATUS POST SURGERY: Primary | ICD-10-CM

## 2019-08-07 PROCEDURE — 97110 THERAPEUTIC EXERCISES: CPT

## 2019-08-07 PROCEDURE — 97140 MANUAL THERAPY 1/> REGIONS: CPT

## 2019-08-07 NOTE — PROGRESS NOTES
Daily Note     Today's date: 2019  Patient name: Rachel Marte  : 1990  MRN: [id number]  Referring provider: Yesenia Downing PA-C  Dx:   Encounter Diagnosis     ICD-10-CM    1  Status post surgery Z98 890                   Subjective: Patient reports his knee feels very stiff first thing in the morning  1:1 w/ PT/PTA entire session      Objective: See treatment diary below  Manual  8/6x 40' 7/22 x10' 7/24 x10' 7/26 7/29 7/31 x10' 8/5 x50' 8/7 x40'   Prone knee flexion st  HS HS MACHO 10' nv      Seated knee flex LEFT SE     HS HS HS   Supine knee ext st SE HS HS MACHO 5' nv HS HS HS   Scar massage/mobs                          Exercise Diary   8 8   Nu Step 10' bike, 5' Nustep L 10 10' Nu  10' Nu 10' 10' Nu 10' Nu 10' bike 10' Nu    10" bike   Quad sets  5" 3x10 5" 2x10 5" 2x10 20x5" 20x5"     SAQ           SLR - flex  3x10 3x10 3x10 3x10 3x10     Prone knee flexion           Gastroc stretch with step standing  30"x5 30"x5 NP  30"x5     Prone hang   2# x5' 4# 5' 4# 5'      Heel lift / mini squats  3x10 ea 3x10 ea 3x10 ea 3x10 ea 3x10 ea     SLS   fatigue        Extended knee flex EOT BTB 12' EOT BTB x6' EOT #3 PTB x7' 8' EOT MTB 8' EOT MTB 8' EOT belt(2)  7'+5' w/ man OP EOT 7'+7' w man OP   sidestepping   3x                                                                                                    Modalities                                         Assessment: Patients ROM shows minor improvements  Better tolerance for PROM stretching today  Plan: Continue per plan of care

## 2019-08-08 ENCOUNTER — OFFICE VISIT (OUTPATIENT)
Dept: PHYSICAL THERAPY | Facility: REHABILITATION | Age: 29
End: 2019-08-08
Payer: COMMERCIAL

## 2019-08-08 DIAGNOSIS — Z98.890 STATUS POST SURGERY: Primary | ICD-10-CM

## 2019-08-08 PROCEDURE — 97110 THERAPEUTIC EXERCISES: CPT

## 2019-08-08 PROCEDURE — 97140 MANUAL THERAPY 1/> REGIONS: CPT

## 2019-08-08 NOTE — PROGRESS NOTES
Daily Note     Today's date: 2019  Patient name: Estevan Muñoz  : 1990  MRN: [id number]  Referring provider: Ovidio Gallardo PA-C  Dx:   Encounter Diagnosis     ICD-10-CM    1  Status post surgery Z98 890                   Subjective: Patient reports increased pain last night  Objective: See treatment diary below  Manual  7/26 7/29 7/31 x10' 8/5 x50' /7 x40' 88 x30'   Prone knee flexion st MACHO 10' nv       Seated knee flex LEFT   HS HS HS HS   Supine knee ext st MACHO 5' nv HS HS HS HS   Scar massage/mobs                      Exercise Diary   8   Nu Step 10' 10' Nu 10' Nu 10' bike 10' Nu    10" bike 10' bike   Quad sets 5" 2x10 20x5" 20x5"      SAQ         SLR - flex 3x10 3x10 3x10      Prone knee flexion         Gastroc stretch with step standing NP  30"x5      Prone hang 4# 5' 4# 5'       Heel lift / mini squats 3x10 ea 3x10 ea 3x10 ea      SLS         Extended knee flex 8' EOT MTB 8' EOT MTB 8' EOT belt(2)  7'+5' w/ man OP EOT 7'+7' w man OP EOT 5'+5'+5'   sidestepping                                                                                     Modalities                                         Assessment: Patient's PROM flex measured at 68* today, educated on the importance of HEP compliance  Plan: Continue per plan of care

## 2019-08-09 ENCOUNTER — OFFICE VISIT (OUTPATIENT)
Dept: PHYSICAL THERAPY | Facility: REHABILITATION | Age: 29
End: 2019-08-09
Payer: COMMERCIAL

## 2019-08-09 DIAGNOSIS — Z98.890 STATUS POST SURGERY: Primary | ICD-10-CM

## 2019-08-09 PROCEDURE — 97110 THERAPEUTIC EXERCISES: CPT

## 2019-08-09 PROCEDURE — 97140 MANUAL THERAPY 1/> REGIONS: CPT

## 2019-08-09 NOTE — PROGRESS NOTES
Daily Note     Today's date: 2019  Patient name: Estevan Muñoz  : 1990  MRN: [id number]  Referring provider: Ovidio Gallardo PA-C  Dx:   Encounter Diagnosis     ICD-10-CM    1  Status post surgery Z98 890                   Subjective: Patient reports that he feels discouraged  Objective: See treatment diary below  Manual  7/26 7/29 7/31 x10' 8/5 x50' 8/7 x40' 8/8 x30' 8/9 x15'   Prone knee flexion st MACHO 10' nv        Seated knee flex LEFT   HS HS HS HS HS   Supine knee ext st MACHO 5' nv HS HS HS HS HS   Scar massage/mobs                        Exercise Diary   8 8 8 8   Nu Step 10' 10' Nu 10' Nu 10' bike 10' Nu    10" bike 10' bike 10' Bike Nu x10'   Quad sets 5" 2x10 20x5" 20x5"       SAQ          SLR - flex 3x10 3x10 3x10       Prone knee flexion          Gastroc stretch with step standing NP  30"x5       Prone hang 4# 5' 4# 5'        Heel lift / mini squats 3x10 ea 3x10 ea 3x10 ea       SLS          Extended knee flex 8' EOT MTB 8' EOT MTB 8' EOT belt(2)  7'+5' w/ man OP EOT 7'+7' w man OP EOT 5'+5'+5' EOT  x10'   sidestepping                                                                                              Modalities                                         Assessment: pt has fair tolerance for PROM stretching today  Plan: Continue per plan of care

## 2019-08-12 ENCOUNTER — OFFICE VISIT (OUTPATIENT)
Dept: PHYSICAL THERAPY | Facility: REHABILITATION | Age: 29
End: 2019-08-12
Payer: COMMERCIAL

## 2019-08-12 DIAGNOSIS — Z98.890 STATUS POST SURGERY: Primary | ICD-10-CM

## 2019-08-12 PROCEDURE — 97110 THERAPEUTIC EXERCISES: CPT

## 2019-08-12 PROCEDURE — 97140 MANUAL THERAPY 1/> REGIONS: CPT

## 2019-08-12 NOTE — PROGRESS NOTES
Daily Note     Today's date: 2019  Patient name: Fifi Srinivasan  : 1990  MRN: [id number]  Referring provider: Zechariah Mayes PA-C  Dx:   Encounter Diagnosis     ICD-10-CM    1  Status post surgery Z98 890                   Subjective: Patient reports having extreme lateral knee pain  Objective: See treatment diary below  Manual  7/26 7/29 7/31 x10' 8/5 x50' 8/7 x40' 8/8 x30' 8/9 x15' 8/12 x45'   Prone knee flexion st MACHO 10' nv         Seated knee flex LEFT   HS HS HS HS HS HS   Supine knee ext st MACHO 5' nv HS HS HS HS HS HS   Scar massage/mobs                          Exercise Diary   8 8 8   Nu Step 10' 10' Nu 10' Nu 10' bike 10' Nu    10" bike 10' bike 10' Bike Nu x10' 10' Bike 10' Nu   Quad sets 5" 2x10 20x5" 20x5"        SAQ           SLR - flex 3x10 3x10 3x10        Prone knee flexion           Gastroc stretch with step standing NP  30"x5        Prone hang 4# 5' 4# 5'         Heel lift / mini squats 3x10 ea 3x10 ea 3x10 ea        SLS           Extended knee flex 8' EOT MTB 8' EOT MTB 8' EOT belt(2)  7'+5' w/ man OP EOT 7'+7' w man OP EOT 5'+5'+5' EOT  x10' EOT manual   sidestepping           Calf/hamstring stretch        30"x5 ea                                                                                    Modalities                                         Assessment: Patient does not tolerance PROM stretching well  Plan: Continue per plan of care

## 2019-08-13 ENCOUNTER — OFFICE VISIT (OUTPATIENT)
Dept: OBGYN CLINIC | Facility: HOSPITAL | Age: 29
End: 2019-08-13

## 2019-08-13 ENCOUNTER — OFFICE VISIT (OUTPATIENT)
Dept: PHYSICAL THERAPY | Facility: REHABILITATION | Age: 29
End: 2019-08-13
Payer: COMMERCIAL

## 2019-08-13 VITALS
HEART RATE: 67 BPM | BODY MASS INDEX: 25.26 KG/M2 | DIASTOLIC BLOOD PRESSURE: 88 MMHG | SYSTOLIC BLOOD PRESSURE: 151 MMHG | WEIGHT: 151.8 LBS

## 2019-08-13 DIAGNOSIS — Z98.890 STATUS POST SURGERY: Primary | ICD-10-CM

## 2019-08-13 PROCEDURE — 97112 NEUROMUSCULAR REEDUCATION: CPT | Performed by: PHYSICAL THERAPIST

## 2019-08-13 PROCEDURE — 97140 MANUAL THERAPY 1/> REGIONS: CPT | Performed by: PHYSICAL THERAPIST

## 2019-08-13 PROCEDURE — 99024 POSTOP FOLLOW-UP VISIT: CPT | Performed by: ORTHOPAEDIC SURGERY

## 2019-08-13 PROCEDURE — 97110 THERAPEUTIC EXERCISES: CPT | Performed by: PHYSICAL THERAPIST

## 2019-08-13 NOTE — PROGRESS NOTES
Daily Note     Today's date: 2019  Patient name: Gera Santiago  : 1990  MRN: [id number]  Referring provider: Janina Nguyen PA-C  Dx:   Encounter Diagnosis     ICD-10-CM    1  Status post surgery Z98 890                   Subjective: Patient reports that he was working on bending his knee every 2 hours yesterday  Objective: See treatment diary below       Assessment: Tolerated treatment well but continues to have pain with exercises and manual stretching  Would benefit from continued PT to address current deficits and to improve quality of life  Plan: Continue per plan of care        Manual  8/13 7/29 7/31 x10' 8/5 x50' 8/7 x40' 8/8 x30' 8/9 x15' 8/12 x45'   Prone knee flexion st SE 10' nv         Seated knee flex LEFT   HS HS HS HS HS HS   Supine knee ext st SE  5' nv HS HS HS HS HS HS   Scar massage/mobs                          Exercise Diary   8 8   Nu Step 10' 10' Nu 10' Nu 10' bike 10' Nu    10" bike 10' bike 10' Bike Nu x10' 10' Bike 10' Nu   Quad sets  20x5" 20x5"        SAQ           SLR - flex  3x10 3x10        Prone knee flexion           Gastroc stretch with step standing NP  30"x5        Prone hang  4# 5'         Heel lift / mini squats  3x10 ea 3x10 ea        SLS           Extended knee flex EOT manual  EOT MTB 8' EOT MTB 8' EOT belt(2)  7'+5' w/ man OP EOT 7'+7' w man OP EOT 5'+5'+5' EOT  x10' EOT manual   sidestepping           Calf/hamstring stretch        30"x5 ea                                                                                    Modalities
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
## 2019-08-14 ENCOUNTER — OFFICE VISIT (OUTPATIENT)
Dept: PHYSICAL THERAPY | Facility: REHABILITATION | Age: 29
End: 2019-08-14
Payer: COMMERCIAL

## 2019-08-14 DIAGNOSIS — Z98.890 STATUS POST SURGERY: Primary | ICD-10-CM

## 2019-08-14 PROCEDURE — 97110 THERAPEUTIC EXERCISES: CPT

## 2019-08-14 PROCEDURE — 97140 MANUAL THERAPY 1/> REGIONS: CPT

## 2019-08-14 NOTE — PROGRESS NOTES
Daily Note     Today's date: 2019  Patient name: Kvng Hamilton  : 1990  MRN: [id number]  Referring provider: Jada Aden PA-C  Dx:   Encounter Diagnosis     ICD-10-CM    1  Status post surgery Z98 890                   Subjective: Patient reports that he has really been trying to bend his knee more at home  Objective: See treatment diary below  Manual  8/7 x40' 8/8 x30' 8/9 x15' 8/12 x45'    Prone knee flexion st        Seated knee flex LEFT HS HS HS HS HS   Supine knee ext st HS HS HS HS HS   Scar massage/mobs                    Exercise Diary   8   Nu Step 10' Nu    10" bike 10' bike 10' Bike Nu x10' 10' Bike 10' Nu 10' Bike 10' Nu   Quad sets        SAQ        SLR - flex        Prone knee flexion        Gastroc stretch with step standing        Prone hang        Heel lift / mini squats        SLS        Extended knee flex EOT 7'+7' w man OP EOT 5'+5'+5' EOT  x10' EOT manual Chair manual   sidestepping        Calf/hamstring stretch    30"x5 ea                                                                Modalities                                                 Assessment: Patient knee flex measured at 68* today  Plan: Continue per plan of care

## 2019-08-15 ENCOUNTER — OFFICE VISIT (OUTPATIENT)
Dept: PHYSICAL THERAPY | Facility: REHABILITATION | Age: 29
End: 2019-08-15
Payer: COMMERCIAL

## 2019-08-15 DIAGNOSIS — Z98.890 STATUS POST SURGERY: Primary | ICD-10-CM

## 2019-08-15 PROCEDURE — 97140 MANUAL THERAPY 1/> REGIONS: CPT

## 2019-08-15 PROCEDURE — 97110 THERAPEUTIC EXERCISES: CPT

## 2019-08-15 NOTE — PROGRESS NOTES
Daily Note     Today's date: 8/15/2019  Patient name: Alexa Bailey  : 1990  MRN: [id number]  Referring provider: Michelle Dykes PA-C  Dx:   Encounter Diagnosis     ICD-10-CM    1  Status post surgery Z98 890                   Subjective: Patient reports no new complaints  Objective: See treatment diary below  Manual  8/7 x40' 8/8 x30' 8/9 x15' 8/12 x45' 8/14 8/15 x40'   Prone knee flexion st         Seated knee flex LEFT HS HS HS HS HS HS   Supine knee ext st HS HS HS HS HS HS   Scar massage/mobs         IASTM LEFT       HS       Exercise Diary  8/7 8/8 8/9 8/12 8/14 8/15   Nu Step 10' Nu    10" bike 10' bike 10' Bike Nu x10' 10' Bike 10' Nu 10' Bike 10' Nu 10' Bike 10' Nu   Quad sets         SAQ         SLR - flex         Prone knee flexion         Gastroc stretch with step standing         Prone hang         Heel lift / mini squats         SLS         Extended knee flex EOT 7'+7' w man OP EOT 5'+5'+5' EOT  x10' EOT manual Chair manual Chair manual   sidestepping         Calf/hamstring stretch    30"x5 ea                                                                        Modalities                                                 Assessment: Patient knee flex measured at 66* today  Plan: Continue per plan of care

## 2019-08-16 ENCOUNTER — TELEPHONE (OUTPATIENT)
Dept: OBGYN CLINIC | Facility: HOSPITAL | Age: 29
End: 2019-08-16

## 2019-08-16 ENCOUNTER — OFFICE VISIT (OUTPATIENT)
Dept: PHYSICAL THERAPY | Facility: REHABILITATION | Age: 29
End: 2019-08-16
Payer: COMMERCIAL

## 2019-08-16 DIAGNOSIS — Z98.890 STATUS POST SURGERY: Primary | ICD-10-CM

## 2019-08-16 PROCEDURE — 97140 MANUAL THERAPY 1/> REGIONS: CPT

## 2019-08-16 NOTE — TELEPHONE ENCOUNTER
Patient stopped by today to  a script for a device for his leg  I do not see any script for that  Please call patient if there are any questions

## 2019-08-16 NOTE — TELEPHONE ENCOUNTER
Patient is calling wondering why he is unable to return to work  He says that he thought that he was able to return light duty even though he was having a lot of problems with his employer accommodating that for him

## 2019-08-16 NOTE — PROGRESS NOTES
Daily Note     Today's date: 2019  Patient name: Barbie Kaplan  : 1990  MRN: [id number]  Referring provider: Wayne Duenas PA-C  Dx:   Encounter Diagnosis     ICD-10-CM    1  Status post surgery Z98 890                   Subjective: Patient states he is very sore today  1:1 30 min, indept TE remaining    Objective: See treatment diary below  Manual  8/7 x40' 8/8 x30' 8/9 x15' 8/12 x45' 8/14 8/15 x40'    Prone knee flexion st          Seated knee flex LEFT HS HS HS HS HS HS HS   Supine knee ext st HS HS HS HS HS HS HS   Scar massage/mobs          IASTM LEFT       HS HS       Exercise Diary  8/7 8/8 8/9 8/12 8/14 8/15 8/16   Nu Step 10' Nu    10" bike 10' bike 10' Bike Nu x10' 10' Bike 10' Nu 10' Bike 10' Nu 10' Bike 10' Nu 10' bike 10' Nu   Quad sets          SAQ          SLR - flex          Prone knee flexion          Gastroc stretch with step standing          Prone hang          Heel lift / mini squats          SLS          Extended knee flex EOT 7'+7' w man OP EOT 5'+5'+5' EOT  x10' EOT manual Chair manual Chair manual Chair manual   sidestepping          Calf/hamstring stretch    30"x5 ea                                                                                Modalities                                                 Assessment: Decreased tolerance for PROM stretching today  Plan: Continue per plan of care

## 2019-08-19 ENCOUNTER — OFFICE VISIT (OUTPATIENT)
Dept: PHYSICAL THERAPY | Facility: REHABILITATION | Age: 29
End: 2019-08-19
Payer: COMMERCIAL

## 2019-08-19 DIAGNOSIS — Z98.890 STATUS POST SURGERY: Primary | ICD-10-CM

## 2019-08-19 PROCEDURE — 97110 THERAPEUTIC EXERCISES: CPT

## 2019-08-19 PROCEDURE — 97140 MANUAL THERAPY 1/> REGIONS: CPT

## 2019-08-19 NOTE — TELEPHONE ENCOUNTER
I called patient to clarify the letters and what he's asking for  He states the  wants a described light duty note  He works for [employer] 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

## 2019-08-19 NOTE — PROGRESS NOTES
Daily Note     Today's date: 2019  Patient name: Rosa Cooper  : 1990  MRN: [id number]  Referring provider: Jacey Robertson PA-C  Dx:   Encounter Diagnosis     ICD-10-CM    1  Status post surgery Z98 890                   Subjective: Patient states he is very sore today  1:1 30 min, indept TE remaining    Objective: See treatment diary below  Manual  8/7 x40' 8/8 x30' 8/9 x15' 8/12 x45' 8/14 8/15 x40' 8/16 8/19 x50'   Prone knee flexion st           Seated knee flex LEFT HS HS HS HS HS HS HS HS   Supine knee ext st HS HS HS HS HS HS HS HS   Scar massage/mobs           IASTM LEFT       HS HS        Exercise Diary  8/7 8/8 8/9 8/12 8/14 8/15 8/16 8/19   Nu Step 10' Nu    10" bike 10' bike 10' Bike Nu x10' 10' Bike 10' Nu 10' Bike 10' Nu 10' Bike 10' Nu 10' bike 10' Nu 10' bike 10' Nu   Quad sets           SAQ           SLR - flex           Prone knee flexion           Gastroc stretch with step standing           Prone hang           Heel lift / mini squats           SLS           Extended knee flex EOT 7'+7' w man OP EOT 5'+5'+5' EOT  x10' EOT manual Chair manual Chair manual Chair manual Chair manual    sidestepping           Calf/hamstring stretch    30"x5 ea    30"x5 ea                                                                                    Modalities                                                 Assessment: Flexion measured at 82* today  Plan: Continue per plan of care

## 2019-08-21 ENCOUNTER — TELEPHONE (OUTPATIENT)
Dept: OBGYN CLINIC | Facility: HOSPITAL | Age: 29
End: 2019-08-21

## 2019-08-21 ENCOUNTER — APPOINTMENT (OUTPATIENT)
Dept: PHYSICAL THERAPY | Facility: REHABILITATION | Age: 29
End: 2019-08-21
Payer: COMMERCIAL

## 2019-08-21 NOTE — TELEPHONE ENCOUNTER
[address] [place] [address] Devan    Patient is needing a new work note stating he will be revaluated and next appt 10/15  Fax [phone] Katelyn Quinonez

## 2019-08-22 ENCOUNTER — OFFICE VISIT (OUTPATIENT)
Dept: PHYSICAL THERAPY | Facility: REHABILITATION | Age: 29
End: 2019-08-22
Payer: COMMERCIAL

## 2019-08-22 DIAGNOSIS — Z98.890 STATUS POST SURGERY: Primary | ICD-10-CM

## 2019-08-22 PROCEDURE — 97140 MANUAL THERAPY 1/> REGIONS: CPT

## 2019-08-22 PROCEDURE — 97110 THERAPEUTIC EXERCISES: CPT

## 2019-08-22 NOTE — TELEPHONE ENCOUNTER
Patient is calling again regarding the equipment that was mentioned to him at his last appointment on 08/13  He states that it was for his knee  He never received a script

## 2019-08-22 NOTE — TELEPHONE ENCOUNTER
I spoke to [facility] in PT and he will call the rep to come and start the process to get the flexinator

## 2019-08-22 NOTE — PROGRESS NOTES
Daily Note     Today's date: 2019  Patient name: Yudi Shepherd  : 1990  MRN: [id number]  Referring provider: Priscila Ceballos PA-C  Dx:   Encounter Diagnosis     ICD-10-CM    1  Status post surgery Z98 890                   Subjective: Patient states he is very sore today  1:1 60 min, indept TE remaining    Objective: See treatment diary below  Manual  8/7 x40' 8/8 x30' 8/9 x15' 8/12 x45' 8/14 8/15 x40' 8/16 8/19 x50' 8/22 x30'   Prone knee flexion st            Seated knee flex LEFT HS HS HS HS HS HS HS HS HS   Supine knee ext st HS HS HS HS HS HS HS HS HS   Scar massage/mobs            IASTM LEFT       HS HS         Exercise Diary  8/7 8/8 8/9 8/12 8/14 8/15 8/16 8/19 8/22   Nu Step 10' Nu    10" bike 10' bike 10' Bike Nu x10' 10' Bike 10' Nu 10' Bike 10' Nu 10' Bike 10' Nu 10' bike 10' Nu 10' bike 10' Nu 10' bike 10' Nu   Quad sets            SAQ            SLR - flex            Prone knee flexion            Gastroc stretch with step standing            Prone hang            Heel lift / mini squats            SLS            Extended knee flex EOT 7'+7' w man OP EOT 5'+5'+5' EOT  x10' EOT manual Chair manual Chair manual Chair manual Chair manual  Chair manual   sidestepping            Calf/hamstring stretch    30"x5 ea    30"x5 ea    Prone knee flex stretch         10'                                                                               Modalities                                                 Assessment: Flexion measure 83" today, good tolerance for all stretching  Plan: Continue per plan of care

## 2019-08-23 ENCOUNTER — OFFICE VISIT (OUTPATIENT)
Dept: PHYSICAL THERAPY | Facility: REHABILITATION | Age: 29
End: 2019-08-23
Payer: COMMERCIAL

## 2019-08-23 DIAGNOSIS — Z98.890 STATUS POST SURGERY: Primary | ICD-10-CM

## 2019-08-23 PROCEDURE — 97110 THERAPEUTIC EXERCISES: CPT

## 2019-08-23 PROCEDURE — 97140 MANUAL THERAPY 1/> REGIONS: CPT

## 2019-08-23 NOTE — PROGRESS NOTES
Daily Note     Today's date: 2019  Patient name: Bradley Tolentino  : 1990  MRN: [id number]  Referring provider: Abeba Russo PA-C  Dx:   Encounter Diagnosis     ICD-10-CM    1  Status post surgery Z98 890                   Subjective: Patient reports no changes since LV   1:1 45min with PTA, indep TE remaining  Objective: See treatment diary below  Manual  8/12 x45' 8/14 8/15 x40' 8/16 8/19 x50' 8/22 x30'    Prone knee flexion st          Seated knee flex LEFT HS HS HS HS HS HS    Supine knee ext st HS HS HS HS HS HS HS   Scar massage/mobs          IASTM LEFT    HS HS      Prone knee flex       HS       Exercise Diary  8/12 8/14 8/15 8/16 8/19 8/22 8/23   Nu Step 10' Bike 10' Nu 10' Bike 10' Nu 10' Bike 10' Nu 10' bike 10' Nu 10' bike 10' Nu 10' bike 10' Nu 10' bike 10' Nu   Quad sets          SAQ          SLR - flex          Prone knee flexion          Gastroc stretch with step standing          Prone hang          Heel lift / mini squats          SLS          Extended knee flex EOT manual Chair manual Chair manual Chair manual Chair manual  Chair manual    sidestepping          Calf/hamstring stretch 30"x5 ea    30"x5 ea     Prone knee flex stretch      10' 10'                                                                   Modalities                                                 Assessment: decreased tolerance for PROM today due to pt in PT 2 days in a row  Plan: Continue per plan of care

## 2019-08-26 ENCOUNTER — APPOINTMENT (OUTPATIENT)
Dept: PHYSICAL THERAPY | Facility: REHABILITATION | Age: 29
End: 2019-08-26
Payer: COMMERCIAL

## 2019-08-26 ENCOUNTER — OFFICE VISIT (OUTPATIENT)
Dept: PHYSICAL THERAPY | Facility: REHABILITATION | Age: 29
End: 2019-08-26
Payer: COMMERCIAL

## 2019-08-26 DIAGNOSIS — Z98.890 STATUS POST SURGERY: Primary | ICD-10-CM

## 2019-08-26 PROCEDURE — 97110 THERAPEUTIC EXERCISES: CPT

## 2019-08-26 PROCEDURE — 97140 MANUAL THERAPY 1/> REGIONS: CPT

## 2019-08-27 ENCOUNTER — TELEPHONE (OUTPATIENT)
Dept: OBGYN CLINIC | Facility: HOSPITAL | Age: 29
End: 2019-08-27

## 2019-08-27 DIAGNOSIS — Z98.890 STATUS POST SURGERY: ICD-10-CM

## 2019-08-27 RX ORDER — IBUPROFEN 600 MG/1
600 TABLET ORAL EVERY 8 HOURS PRN
Qty: 30 TABLET | Refills: 0 | Status: SHIPPED | OUTPATIENT
Start: 2019-08-27 | End: 2020-06-04

## 2019-08-27 NOTE — TELEPHONE ENCOUNTER
When does the patient wanted go back to work?   I can write a work no when the patient wishes to go back to work or he can use 1 of the previously 3 notes written to go back to work when he desires

## 2019-08-27 NOTE — TELEPHONE ENCOUNTER
Delia Quintana, patient's sister, is calling  Pt contacted Call Center requested refill of their medication  Medication Name:  Ibuprofen  Dosage of Med: 600 mg, but pt is asking for 800 mg  Frequency of Med:  Take 1 tablet (600 mg total) by mouth every 8 (eight) hours as needed for mild pain  Remaining Medication:  Unknown           Medication Name:  Hydrocodone-acetaminophen  Dosage of Med:  5-325 mg  Frequency of Med: Take 1 tablet by mouth every 6 (six) hours as needed for pain for up to 10 daysMax Daily Amount: 4 tablets  Remaining Medication: 2      Pharmacy and Location:  [facility]/pharmacy #[id number] #[id number], [facility] @[address]  Pt  Preferred Callback Phone Number:  Wilmar Orozco patient's sister [phone]      Thank you

## 2019-08-27 NOTE — TELEPHONE ENCOUNTER
Anish Trotter called in, he is the patient's employer  He states he has received three different letters with different restrictions for return to work  8/13 signed by the PA that states patient can return with restriction  2nd dated 8/19 signed by the doctor that states return to work with restrictions  3rd signed by the PA states not to return to work until re-evaluation at next visit 10/15/19  Please clarify the patient's work status      # [phone]

## 2019-08-27 NOTE — TELEPHONE ENCOUNTER
Patient called in stating he wants to go back to work on light duty  Patient is going to be using the work note on file   Fax over work note to #[phone]

## 2019-08-27 NOTE — TELEPHONE ENCOUNTER
Patient called in today and said that the letter needs to be sent today or he may not be able to return to work tomorrow  Please fax asap      Fax# [phone]

## 2019-08-28 ENCOUNTER — TELEPHONE (OUTPATIENT)
Dept: OBGYN CLINIC | Facility: HOSPITAL | Age: 29
End: 2019-08-28

## 2019-08-28 ENCOUNTER — APPOINTMENT (OUTPATIENT)
Dept: PHYSICAL THERAPY | Facility: REHABILITATION | Age: 29
End: 2019-08-28
Payer: COMMERCIAL

## 2019-08-28 ENCOUNTER — DOCUMENTATION (OUTPATIENT)
Dept: OBGYN CLINIC | Facility: HOSPITAL | Age: 29
End: 2019-08-28

## 2019-08-28 NOTE — TELEPHONE ENCOUNTER
Patient came into the office this morning requesting an updated work note  He requested the note to say that he is able to return to work with light restictions as noted on 8/13/19 completed form   The note was faxed to [facility] @ [phone] as per the patient's request

## 2019-08-29 ENCOUNTER — APPOINTMENT (OUTPATIENT)
Dept: PHYSICAL THERAPY | Facility: REHABILITATION | Age: 29
End: 2019-08-29
Payer: COMMERCIAL

## 2019-08-30 ENCOUNTER — APPOINTMENT (OUTPATIENT)
Dept: PHYSICAL THERAPY | Facility: REHABILITATION | Age: 29
End: 2019-08-30
Payer: COMMERCIAL

## 2019-09-03 ENCOUNTER — APPOINTMENT (OUTPATIENT)
Dept: PHYSICAL THERAPY | Facility: REHABILITATION | Age: 29
End: 2019-09-03
Payer: COMMERCIAL

## 2019-09-03 ENCOUNTER — OFFICE VISIT (OUTPATIENT)
Dept: PHYSICAL THERAPY | Facility: REHABILITATION | Age: 29
End: 2019-09-03
Payer: COMMERCIAL

## 2019-09-03 DIAGNOSIS — M79.605 PAIN OF LEFT LOWER EXTREMITY: Primary | ICD-10-CM

## 2019-09-03 DIAGNOSIS — Z98.890 STATUS POST SURGERY: Primary | ICD-10-CM

## 2019-09-03 PROCEDURE — 97110 THERAPEUTIC EXERCISES: CPT

## 2019-09-03 PROCEDURE — 97140 MANUAL THERAPY 1/> REGIONS: CPT

## 2019-09-03 RX ORDER — TRAMADOL HYDROCHLORIDE 50 MG/1
50 TABLET ORAL EVERY 8 HOURS PRN
Qty: 30 TABLET | Refills: 0 | Status: SHIPPED | OUTPATIENT
Start: 2019-09-03 | End: 2019-09-13

## 2019-09-03 NOTE — TELEPHONE ENCOUNTER
Patient is calling in asking if  can send tramadol script to his pharmacy   Please advise, thank you

## 2019-09-04 ENCOUNTER — OFFICE VISIT (OUTPATIENT)
Dept: PHYSICAL THERAPY | Facility: REHABILITATION | Age: 29
End: 2019-09-04
Payer: COMMERCIAL

## 2019-09-04 ENCOUNTER — APPOINTMENT (OUTPATIENT)
Dept: PHYSICAL THERAPY | Facility: REHABILITATION | Age: 29
End: 2019-09-04
Payer: COMMERCIAL

## 2019-09-04 DIAGNOSIS — Z98.890 STATUS POST SURGERY: Primary | ICD-10-CM

## 2019-09-04 PROCEDURE — 97110 THERAPEUTIC EXERCISES: CPT

## 2019-09-04 PROCEDURE — 97140 MANUAL THERAPY 1/> REGIONS: CPT

## 2019-09-04 PROCEDURE — 97112 NEUROMUSCULAR REEDUCATION: CPT

## 2019-09-04 NOTE — PROGRESS NOTES
Daily Note     Today's date: 2019  Patient name: Bradley Tolentino  : 1990  MRN: [id number]  Referring provider: Abeba Russo PA-C  Dx:   Encounter Diagnosis     ICD-10-CM    1  Status post surgery Z98 890                   Subjective: Pt  reports no change in status upon arrival       Objective: See treatment diary below      Assessment: Tolerated treatment fair  Patient would benefit from continued PT  Pt requires cuing to relax during stretches, is able to improve ROM when relaxing  Added weight to prone knee ext, pt responded well  Pt  able to complete all exercises with no increase in pain during or after session  Pt 1:1 with PTA [id number], PT CW for remainder  Plan: Continue per plan of care          Manual  8/16 8/19 x50' 8/22 x30' 8/23 8/26 9/3 9/4   Prone knee flexion st      HS KP   Seated knee flex LEFT HS HS HS   HS KP   Supine knee ext st HS HS HS HS HS HS KP   Scar massage/mobs          IASTM LEFT  HS         Prone knee flex    HS HS HS KP       Exercise Diary   9/3 9/4   Nu Step 10' bike 10' Nu 10' bike 10' Nu 10' bike 10' Nu 10' bike 10' Nu 10' bike 10' Nu 10' bike 10' Nu 10' ea   Quad sets          SAQ          SLR - flex          Prone knee flexion          SLS          Extended knee flex Chair manual Chair manual  Chair manual  Chair manual     sidestepping          Calf/hamstring stretch  30"x5 ea        Prone knee flex stretch   10' 10' 10' 15' 10'   Prone knee ext      10' 10' 5#                                                         Modalities

## 2019-09-05 ENCOUNTER — OFFICE VISIT (OUTPATIENT)
Dept: PHYSICAL THERAPY | Facility: REHABILITATION | Age: 29
End: 2019-09-05
Payer: COMMERCIAL

## 2019-09-05 DIAGNOSIS — Z98.890 STATUS POST SURGERY: Primary | ICD-10-CM

## 2019-09-05 PROCEDURE — 97140 MANUAL THERAPY 1/> REGIONS: CPT | Performed by: PHYSICAL THERAPIST

## 2019-09-05 PROCEDURE — 97110 THERAPEUTIC EXERCISES: CPT | Performed by: PHYSICAL THERAPIST

## 2019-09-05 NOTE — PROGRESS NOTES
Daily Note     Today's date: 2019  Patient name: Pepe Crowley  : 1990  MRN: [id number]  Referring provider: Mali Johnson PA-C  Dx:   Encounter Diagnosis     ICD-10-CM    1  Status post surgery Z98 890                   Subjective: Patient states he is doing well with no new complaints  Objective: See treatment diary below      Assessment: Continues to have difficulty relaxing during PROM and stretching, but has mild gains in ROM after he is able to relax  Would benefit from continued PT to address deficits with lower extremity function, mobility, and range of motion  Plan: Continue per plan of care        Manual  8/16 8/19 x50' 8/22 x30' 8/23 8/26 9/3 9/4   Prone knee flexion st SE     HS KP   Seated knee flex LEFT SE HS HS   HS KP   Supine knee ext st SE HS HS HS HS HS KP   Scar massage/mobs          IASTM LEFT           Prone knee flex SE   HS HS HS KP       Exercise Diary  9/5 8/19 8/22 8/23 8/26 9/3 9   Nu Step 10' bike 10' Nu 10' bike 10' Nu 10' bike 10' Nu 10' bike 10' Nu 10' bike 10' Nu 10' bike 10' Nu 10' ea   Quad sets          SAQ          SLR - flex          Prone knee flexion          SLS          Extended knee flex Chair manual Chair manual  Chair manual  Chair manual     sidestepping          Calf/hamstring stretch  30"x5 ea        Prone knee flex stretch 10'   10' 10' 10' 15' 10'   Prone knee ext 10'5#     10' 10' 5#                                                         Modalities

## 2019-09-06 ENCOUNTER — APPOINTMENT (OUTPATIENT)
Dept: PHYSICAL THERAPY | Facility: REHABILITATION | Age: 29
End: 2019-09-06
Payer: COMMERCIAL

## 2019-09-09 ENCOUNTER — APPOINTMENT (OUTPATIENT)
Dept: PHYSICAL THERAPY | Facility: REHABILITATION | Age: 29
End: 2019-09-09
Payer: COMMERCIAL

## 2019-09-09 ENCOUNTER — TELEPHONE (OUTPATIENT)
Dept: OBGYN CLINIC | Facility: CLINIC | Age: 29
End: 2019-09-09

## 2019-09-09 DIAGNOSIS — M00.062 STAPHYLOCOCCAL ARTHRITIS OF LEFT KNEE (HCC): ICD-10-CM

## 2019-09-09 RX ORDER — GABAPENTIN 100 MG/1
200 CAPSULE ORAL 3 TIMES DAILY
Qty: 90 CAPSULE | Refills: 0 | Status: SHIPPED | OUTPATIENT
Start: 2019-09-09 | End: 2019-10-07 | Stop reason: SDUPTHER

## 2019-09-09 NOTE — TELEPHONE ENCOUNTER
Pt contacted Call Center requested refill of their medication  Medication Name: Gabapentin      Dosage of Med: 100 mg      Frequency of Med: 2 capsules 3 x day      Remaining Medication: 1      Pharmacy and Location:  [address] [place] [address]         Pt  Preferred Callback Phone Number: [phone]       Thank you

## 2019-09-10 ENCOUNTER — OFFICE VISIT (OUTPATIENT)
Dept: PHYSICAL THERAPY | Facility: REHABILITATION | Age: 29
End: 2019-09-10
Payer: COMMERCIAL

## 2019-09-10 DIAGNOSIS — Z98.890 STATUS POST SURGERY: Primary | ICD-10-CM

## 2019-09-10 PROCEDURE — 97112 NEUROMUSCULAR REEDUCATION: CPT

## 2019-09-10 PROCEDURE — 97140 MANUAL THERAPY 1/> REGIONS: CPT

## 2019-09-10 PROCEDURE — 97110 THERAPEUTIC EXERCISES: CPT

## 2019-09-10 NOTE — PROGRESS NOTES
Daily Note     Today's date: 9/10/2019  Patient name: Trinity Ferguson  : 1990  MRN: [id number]  Referring provider: Lex Soto PA-C  Dx:   Encounter Diagnosis     ICD-10-CM    1  Status post surgery Z98 890                   Subjective: Pt  reports no change in status upon arrival, though does state he feels there is some decrease in pain when returning to neutral position after a prolonged stretch  Objective: See treatment diary below      Assessment: Tolerated treatment well  Patient would benefit from continued PT  Trialed using MHP in combination with prone hang this session, pt responded well  Pt  able to complete all exercises with no increase in pain during or after session  Pt  1:1 with PTA for entirety  Plan: Continue per plan of care        Manual  8/16 8/19 x50' 8/22 x30' 8/23 8/26 9/3 9/4 9/10   Prone knee flexion st SE     HS KP KP   Seated knee flex LEFT SE HS HS   HS KP KP   Supine knee ext st SE HS HS HS HS HS KP KP   Scar massage/mobs           IASTM LEFT            Prone knee flex SE   HS HS HS KP KP       Exercise Diary  9/5 8/19 8/22 8/23 8/26 9/3 9/4 910   Nu Step 10' bike 10' Nu 10' bike 10' Nu 10' bike 10' Nu 10' bike 10' Nu 10' bike 10' Nu 10' bike 10' Nu 10' ea 10' ea   Quad sets           SAQ           SLR - flex           Prone knee flexion           SLS           Extended knee flex Chair manual Chair manual  Chair manual  Chair manual      sidestepping           Calf/hamstring stretch  30"x5 ea         Prone knee flex stretch 10'   10' 10' 10' 15' 10' 10'   Prone knee ext 10'5#     10' 10' 5# 10' 5# w/MHP                                                              Modalities

## 2019-09-11 ENCOUNTER — OFFICE VISIT (OUTPATIENT)
Dept: PHYSICAL THERAPY | Facility: REHABILITATION | Age: 29
End: 2019-09-11
Payer: COMMERCIAL

## 2019-09-11 ENCOUNTER — APPOINTMENT (OUTPATIENT)
Dept: PHYSICAL THERAPY | Facility: REHABILITATION | Age: 29
End: 2019-09-11
Payer: COMMERCIAL

## 2019-09-11 DIAGNOSIS — Z98.890 STATUS POST SURGERY: Primary | ICD-10-CM

## 2019-09-11 PROCEDURE — 97112 NEUROMUSCULAR REEDUCATION: CPT

## 2019-09-11 PROCEDURE — 97140 MANUAL THERAPY 1/> REGIONS: CPT

## 2019-09-11 PROCEDURE — 97110 THERAPEUTIC EXERCISES: CPT

## 2019-09-11 NOTE — PROGRESS NOTES
Daily Note     Today's date: 2019  Patient name: Neal Hernandez  : 1990  MRN: [id number]  Referring provider: Chuck Cox PA-C  Dx:   Encounter Diagnosis     ICD-10-CM    1  Status post surgery Z98 890                   Subjective: Patient reports switching to tramadol which makes him tired  Objective: See treatment diary below  Manual  8/16 8/19 x50' 8/22 x30' 8/23 8/26 9/3 9/4 9/10 9/11   Prone knee flexion st SE     HS KP KP HS   Seated knee flex LEFT SE HS HS   HS KP KP HS   Supine knee ext st SE HS HS HS HS HS KP KP HS   Scar massage/mobs            IASTM LEFT             Prone knee flex SE   HS HS HS KP KP HS       Exercise Diary  9/5 8/19 8/22 8/23 8/26 9/3 9/4 9/10 9/11   Nu Step 10' bike 10' Nu 10' bike 10' Nu 10' bike 10' Nu 10' bike 10' Nu 10' bike 10' Nu 10' bike 10' Nu 10' ea 10' ea 10' bike  10' Nu   Quad sets            SAQ            SLR - flex            Prone knee flexion            SLS            Extended knee flex Chair manual Chair manual  Chair manual  Chair manual       sidestepping            Calf/hamstring stretch  30"x5 ea          Prone knee flex stretch 10'   10' 10' 10' 15' 10' 10' 10'   Prone knee ext 10'5#     10' 10' 5# 10' 5# w/MHP 10' 5# w/ MHP                                                                   Modalities                                                    Assessment: Fair tolerance for PROM stretching today  Plan: Continue per plan of care

## 2019-09-12 ENCOUNTER — OFFICE VISIT (OUTPATIENT)
Dept: PHYSICAL THERAPY | Facility: REHABILITATION | Age: 29
End: 2019-09-12
Payer: COMMERCIAL

## 2019-09-12 DIAGNOSIS — Z98.890 STATUS POST SURGERY: Primary | ICD-10-CM

## 2019-09-12 PROCEDURE — 97112 NEUROMUSCULAR REEDUCATION: CPT

## 2019-09-12 PROCEDURE — 97140 MANUAL THERAPY 1/> REGIONS: CPT

## 2019-09-12 PROCEDURE — 97110 THERAPEUTIC EXERCISES: CPT

## 2019-09-12 NOTE — PROGRESS NOTES
Daily Note     Today's date: 2019  Patient name: Isiah Sparks  : 1990  MRN: [id number]  Referring provider: Ej Juarez PA-C  Dx:   Encounter Diagnosis     ICD-10-CM    1  Status post surgery Z98 890                   Subjective: Patient reports switching to tramadol which makes him tired  Objective: See treatment diary below  Manual  8/23 8/26 9/3 9/4 9/10 9/11 9/12   Prone knee flexion st   HS KP KP HS HS   Seated knee flex LEFT   HS KP KP HS HS   Supine knee ext st HS HS HS KP KP HS HS   Scar massage/mobs          IASTM LEFT           Prone knee flex HS HS HS KP KP HS HS       Exercise Diary  8/23 8/26 9/3 9/4 9/10 9/11 9/12   Nu Step 10' bike 10' Nu 10' bike 10' Nu 10' bike 10' Nu 10' ea 10' ea 10' bike  10' Nu 10' bike 10' Nu   Quad sets          SAQ          SLR - flex          Prone knee flexion          SLS          Extended knee flex  Chair manual        sidestepping          Calf/hamstring stretch          Prone knee flex stretch 10' 10' 15' 10' 10' 10' 10'   Prone knee ext   10' 10' 5# 10' 5# w/MHP 10' 5# w/ MHP Supine 10' 5#w/ MHP                                                         Modalities                                                    Assessment: Fair tolerance for PROM stretching, increased ext post session  Plan: Continue per plan of care

## 2019-09-13 ENCOUNTER — APPOINTMENT (OUTPATIENT)
Dept: PHYSICAL THERAPY | Facility: REHABILITATION | Age: 29
End: 2019-09-13
Payer: COMMERCIAL

## 2019-09-16 ENCOUNTER — APPOINTMENT (OUTPATIENT)
Dept: PHYSICAL THERAPY | Facility: REHABILITATION | Age: 29
End: 2019-09-16
Payer: COMMERCIAL

## 2019-09-16 ENCOUNTER — OFFICE VISIT (OUTPATIENT)
Dept: PHYSICAL THERAPY | Facility: REHABILITATION | Age: 29
End: 2019-09-16
Payer: COMMERCIAL

## 2019-09-16 DIAGNOSIS — Z98.890 STATUS POST SURGERY: Primary | ICD-10-CM

## 2019-09-16 PROCEDURE — 97110 THERAPEUTIC EXERCISES: CPT

## 2019-09-16 PROCEDURE — 97140 MANUAL THERAPY 1/> REGIONS: CPT

## 2019-09-16 NOTE — PROGRESS NOTES
Daily Note     Today's date: 2019  Patient name: Hamilton Gibson  : 1990  MRN: [id number]  Referring provider: Vielka Abdi PA-C  Dx:   Encounter Diagnosis     ICD-10-CM    1  Status post surgery Z98 890                   Subjective: Patient offers no new complaints at this time  Objective: See treatment diary below  Manual  8/23 8/26 9/3 9/4 9/10 9/11 9/12 9/16   Prone knee flexion st   HS KP KP HS HS    Seated knee flex LEFT   HS KP KP HS HS HS   Supine knee ext st HS HS HS KP KP HS HS HS   Scar massage/mobs           IASTM LEFT            Prone knee flex HS HS HS KP KP HS HS        Exercise Diary  8/23 8/26 9/3 9/4 9/10 9/11 9/12 9/16   Nu Step 10' bike 10' Nu 10' bike 10' Nu 10' bike 10' Nu 10' ea 10' ea 10' bike  10' Nu 10' bike 10' Nu 10' Nu    Quad sets           SAQ           SLR - flex           Prone knee flexion           SLS           Extended knee flex  Chair manual         sidestepping           Calf/hamstring stretch           Prone knee flex stretch 10' 10' 15' 10' 10' 10' 10'    Prone knee ext   10' 10' 5# 10' 5# w/MHP 10' 5# w/ MHP Supine 10' 5#w/ MHP Supine 10" 5# w/ MHP                                                              Modalities                                                    Assessment: Focused on knee ext today with fair tolerance  Plan: Continue per plan of care

## 2019-09-18 ENCOUNTER — OFFICE VISIT (OUTPATIENT)
Dept: PHYSICAL THERAPY | Facility: REHABILITATION | Age: 29
End: 2019-09-18
Payer: COMMERCIAL

## 2019-09-18 ENCOUNTER — APPOINTMENT (OUTPATIENT)
Dept: PHYSICAL THERAPY | Facility: REHABILITATION | Age: 29
End: 2019-09-18
Payer: COMMERCIAL

## 2019-09-18 DIAGNOSIS — Z98.890 STATUS POST SURGERY: Primary | ICD-10-CM

## 2019-09-18 PROCEDURE — 97140 MANUAL THERAPY 1/> REGIONS: CPT

## 2019-09-18 PROCEDURE — 97110 THERAPEUTIC EXERCISES: CPT

## 2019-09-18 NOTE — PROGRESS NOTES
Daily Note     Today's date: 2019  Patient name: Aubree Turner  : 1990  MRN: [id number]  Referring provider: Aida Owen PA-C  Dx:   Encounter Diagnosis     ICD-10-CM    1  Status post surgery Z98 890                   Subjective: Patient reports that he has been working on terminal knee ext  Objective: See treatment diary below  Manual  9/4 9/10 9/11 9/12 9/16 9/18 x25'   Prone knee flexion st KP KP HS HS  Ext HS   Seated knee flex LEFT KP KP HS HS HS    Supine knee ext st KP KP HS HS HS HS   Scar massage/mobs         IASTM LEFT          Prone knee flex KP KP HS HS         Exercise Diary  9/4 9/10 9/11 9/12 9/16 9/18   Nu Step 10' ea 10' ea 10' bike  10' Nu 10' bike 10' Nu 10' Nu  10' Nu 10' Bike   Quad sets         SAQ         SLR - flex         Prone knee flexion         SLS         Extended knee flex         sidestepping         Calf/hamstring stretch         Prone knee flex stretch 10' 10' 10' 10'     Prone knee ext 10' 5# 10' 5# w/MHP 10' 5# w/ MHP Supine 10' 5#w/ MHP Supine 10" 5# w/ MHP Supine 10" w/ strap w  MHP and prone 10' w/ 5#                                                    Modalities                                                    Assessment: Focused on knee ext today with fair tolerance  Plan: Continue per plan of care

## 2019-09-19 ENCOUNTER — APPOINTMENT (OUTPATIENT)
Dept: PHYSICAL THERAPY | Facility: REHABILITATION | Age: 29
End: 2019-09-19
Payer: COMMERCIAL

## 2019-09-20 ENCOUNTER — APPOINTMENT (OUTPATIENT)
Dept: PHYSICAL THERAPY | Facility: REHABILITATION | Age: 29
End: 2019-09-20
Payer: COMMERCIAL

## 2019-09-23 ENCOUNTER — APPOINTMENT (OUTPATIENT)
Dept: PHYSICAL THERAPY | Facility: REHABILITATION | Age: 29
End: 2019-09-23
Payer: COMMERCIAL

## 2019-09-25 ENCOUNTER — APPOINTMENT (OUTPATIENT)
Dept: PHYSICAL THERAPY | Facility: REHABILITATION | Age: 29
End: 2019-09-25
Payer: COMMERCIAL

## 2019-09-26 ENCOUNTER — APPOINTMENT (OUTPATIENT)
Dept: PHYSICAL THERAPY | Facility: REHABILITATION | Age: 29
End: 2019-09-26
Payer: COMMERCIAL

## 2019-09-30 ENCOUNTER — OFFICE VISIT (OUTPATIENT)
Dept: PHYSICAL THERAPY | Facility: REHABILITATION | Age: 29
End: 2019-09-30
Payer: COMMERCIAL

## 2019-09-30 DIAGNOSIS — Z98.890 STATUS POST SURGERY: Primary | ICD-10-CM

## 2019-09-30 PROCEDURE — 97110 THERAPEUTIC EXERCISES: CPT | Performed by: PHYSICAL THERAPIST

## 2019-09-30 NOTE — PROGRESS NOTES
Daily Note     Today's date: 2019  Patient name: Alexa Bailey  : 1990  MRN: [id number]  Referring provider: Michelle Dykes PA-C  Dx:   Encounter Diagnosis     ICD-10-CM    1  Status post surgery Z98 890        Start Time: 1600  Stop Time: 1645  Total time in clinic (min): 45 minutes    Subjective: Patient reports that he does notice improvement in his ability to straighten his knee during ambulation following PT sessions  Objective: See treatment diary below      Assessment: Due to time constraint patient declined manual therapy today  This should be resumed next session  Patient continues to report moderate to severe pain with terminal knee extension stretches but notes improved gait following TERT time  Plan: Continue per plan of care  Manual  9/30  9/11 9/12 9/16 9/18 x25'   Prone knee flexion st   HS HS  Ext HS   Seated knee flex LEFT   HS HS HS    Supine knee ext st   HS HS HS HS   Scar massage/mobs         IASTM LEFT          Prone knee flex   HS HS         Exercise Diary       Nu Step 10' 10' bike  10' bike  10' Nu 10' bike 10' Nu 10' Nu  10' Nu 10' Bike   Quad sets         SAQ         SLR - flex         Prone knee flexion         SLS         Extended knee flex         sidestepping         Calf/hamstring stretch         Prone knee flex stretch   10' 10'     Prone knee ext supine 10" w/ strap and in prone with 5# MHP  10' 5# w/ MHP Supine 10' 5#w/ MHP Supine 10" 5# w/ MHP Supine 10" w/ strap w   MHP and prone 10' w/ 5#                                                    Modalities

## 2019-10-02 ENCOUNTER — OFFICE VISIT (OUTPATIENT)
Dept: PHYSICAL THERAPY | Facility: REHABILITATION | Age: 29
End: 2019-10-02
Payer: COMMERCIAL

## 2019-10-02 DIAGNOSIS — Z98.890 STATUS POST SURGERY: Primary | ICD-10-CM

## 2019-10-02 PROCEDURE — 97110 THERAPEUTIC EXERCISES: CPT

## 2019-10-02 NOTE — PROGRESS NOTES
Daily Note     Today's date: 10/2/2019  Patient name: Cassandra Avila  : 1990  MRN: [id number]  Referring provider: Meg Stratton PA-C  Dx:   Encounter Diagnosis     ICD-10-CM    1  Status post surgery Z98 890                   Subjective: Pt reported increased pain along medial knee where screw is located  He "feels pain has been 5 to 10 times worse and almost feels like MRSA-like pain " Pt to call MD to get in sooner then 10-15  Objective: See treatment diary below  Manual  9/30   9/11 9/12 9/16 9/18 x25' 10/2   Prone knee flexion st     HS HS   Ext HS hold   Seated knee flex LEFT     HS HS HS      Supine knee ext st     HS HS HS HS hold   Scar massage/mobs                IASTM LEFT                 Prone knee flex     HS HS              Exercise Diary       10/2   Nu Step 10' 10' bike   10' bike  10' Nu 10' bike 10' Nu 10' Nu  10' Nu 10' Bike 10' nustep  10' bike   Quad sets                SAQ                SLR - flex                Prone knee flexion                SLS                Extended knee flex                sidestepping                Calf/hamstring stretch                Prone knee flex stretch     10' 10'        Prone knee ext supine 10" w/ strap and in prone with 5# MHP   10' 5# w/ MHP Supine 10' 5#w/ MHP Supine 10" 5# w/ MHP Supine 10" w/ strap w  MHP and prone 10' w/ 5# Supine 10' w/ strap w/MHP and prone 10' w/5#                                                                                              Modalities                                                                      Assessment: Tolerated treatment poor  Patient demonstrated fatigue post treatment  Extreme pain noted with exercises  Very tender along medial knee where screw is located  Plan: Continue per plan of care

## 2019-10-07 ENCOUNTER — OFFICE VISIT (OUTPATIENT)
Dept: PHYSICAL THERAPY | Facility: REHABILITATION | Age: 29
End: 2019-10-07
Payer: COMMERCIAL

## 2019-10-07 ENCOUNTER — TELEPHONE (OUTPATIENT)
Dept: OBGYN CLINIC | Facility: HOSPITAL | Age: 29
End: 2019-10-07

## 2019-10-07 DIAGNOSIS — Z98.890 STATUS POST SURGERY: Primary | ICD-10-CM

## 2019-10-07 DIAGNOSIS — S72.002A CLOSED FRACTURE OF NECK OF LEFT FEMUR, INITIAL ENCOUNTER (HCC): ICD-10-CM

## 2019-10-07 DIAGNOSIS — M00.062 STAPHYLOCOCCAL ARTHRITIS OF LEFT KNEE (HCC): ICD-10-CM

## 2019-10-07 PROCEDURE — 97110 THERAPEUTIC EXERCISES: CPT

## 2019-10-07 RX ORDER — METHOCARBAMOL 750 MG/1
750 TABLET, FILM COATED ORAL EVERY 6 HOURS PRN
Qty: 120 TABLET | Refills: 0 | Status: SHIPPED | OUTPATIENT
Start: 2019-10-07 | End: 2019-12-31 | Stop reason: SDUPTHER

## 2019-10-07 RX ORDER — GABAPENTIN 100 MG/1
200 CAPSULE ORAL 3 TIMES DAILY
Qty: 90 CAPSULE | Refills: 0 | Status: SHIPPED | OUTPATIENT
Start: 2019-10-07 | End: 2019-10-31 | Stop reason: SDUPTHER

## 2019-10-07 NOTE — TELEPHONE ENCOUNTER
Patient's sister, Jabari Reese, contacted Call Center requested refill of their medication  Medication Name:  Methocarbamol      Dosage of Med:  750 mg    Frequency of Med:  1 pill twice daily -script was written as 1 pill 3 times daily though    Remaining Medication:  None      Patient also needs    Medication Name:  Gabapentin     Dosage of Med:  200 mg    Frequency of Med:  2 pills twice daily -script is written for 2 pills 3 times daily    Remaining Medication:  none    Pharmacy and Location:  [facility]       Preferred Callback Phone Number:  [phone]

## 2019-10-08 ENCOUNTER — OFFICE VISIT (OUTPATIENT)
Dept: OBGYN CLINIC | Facility: HOSPITAL | Age: 29
End: 2019-10-08
Payer: COMMERCIAL

## 2019-10-08 ENCOUNTER — HOSPITAL ENCOUNTER (OUTPATIENT)
Dept: RADIOLOGY | Facility: HOSPITAL | Age: 29
Discharge: HOME | End: 2019-10-08
Attending: ORTHOPAEDIC SURGERY
Payer: COMMERCIAL

## 2019-10-08 VITALS — DIASTOLIC BLOOD PRESSURE: 86 MMHG | SYSTOLIC BLOOD PRESSURE: 141 MMHG | HEART RATE: 74 BPM

## 2019-10-08 DIAGNOSIS — Z98.890 STATUS POST SURGERY: ICD-10-CM

## 2019-10-08 DIAGNOSIS — Z98.890 STATUS POST SURGERY: Primary | ICD-10-CM

## 2019-10-08 PROCEDURE — 73552 X-RAY EXAM OF FEMUR 2/>: CPT

## 2019-10-08 PROCEDURE — 99212 OFFICE O/P EST SF 10 MIN: CPT | Performed by: ORTHOPAEDIC SURGERY

## 2019-10-08 PROCEDURE — 73560 X-RAY EXAM OF KNEE 1 OR 2: CPT

## 2019-10-08 NOTE — LETTER
October 8, 2019     Patient: Tomas Noriega   YOB: 1990   Date of Visit: 10/8/2019       To Whom it May Concern:    Tomas Noriega is under my professional care  He was seen in my office on 10/8/2019  If you have any questions or concerns, please don't hesitate to call           Sincerely,          Anthony Fernandez MD        CC: Tomas Merrillix
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
## 2019-10-09 ENCOUNTER — APPOINTMENT (OUTPATIENT)
Dept: PHYSICAL THERAPY | Facility: REHABILITATION | Age: 29
End: 2019-10-09
Payer: COMMERCIAL

## 2019-10-14 ENCOUNTER — OFFICE VISIT (OUTPATIENT)
Dept: PHYSICAL THERAPY | Facility: REHABILITATION | Age: 29
End: 2019-10-14
Payer: COMMERCIAL

## 2019-10-14 DIAGNOSIS — Z98.890 STATUS POST SURGERY: Primary | ICD-10-CM

## 2019-10-14 PROCEDURE — 97110 THERAPEUTIC EXERCISES: CPT

## 2019-10-14 PROCEDURE — 97140 MANUAL THERAPY 1/> REGIONS: CPT

## 2019-10-14 NOTE — PROGRESS NOTES
Daily Note     Today's date: 10/14/2019  Patient name: Roge Carroll  : 1990  MRN: [id number]  Referring provider: Tito Cook PA-C  Dx:   Encounter Diagnosis     ICD-10-CM    1  Status post surgery Z98 890                   Subjective: Patient states he has been walking around more at work with a cane and believes its helping  Objective: See treatment diary below  Manual  9/30   9/11 9/12 9/16 9/18 x25' 10/2 10/7 10/14 x20'   Prone knee flexion st     HS HS   Ext HS hold     Seated knee flex LEFT     HS HS HS        Supine knee ext st     HS HS HS HS hold HS HS   Prone knee flex     HS HS                Exercise Diary      9/11 9/12 9/16 9/18 10/2 10/7 10/14   Nu Step 10' 10' bike   10' bike  10' Nu 10' bike 10' Nu 10' Nu  10' Nu 10' Bike 10' nustep  10' bike 10' Nu 10' bike 10' Nu  10' bike   Quad sets                  SAQ                  SLR - flex                  Prone knee flexion                  SLS                  Extended knee flex                  sidestepping                  Calf/hamstring stretch                  Prone knee flex stretch     10' 10'          Prone knee ext supine 10" w/ strap and in prone with 5# MHP   10' 5# w/ MHP Supine 10' 5#w/ MHP Supine 10" 5# w/ MHP Supine 10" w/ strap w  MHP and prone 10' w/ 5# Supine 10' w/ strap w/MHP and prone 10' w/5# Supine 10' w/ strap w/MHP and prone 10' w/ 5# Supine 10' w/ strap and MHP and prone 10' w/ #5                                                                                                        Modalities                                                                      Assessment: Pt now ambulates with SPC, ext shows slight improvement  Plan: Continue per plan of care

## 2019-10-16 ENCOUNTER — OFFICE VISIT (OUTPATIENT)
Dept: PHYSICAL THERAPY | Facility: REHABILITATION | Age: 29
End: 2019-10-16
Payer: COMMERCIAL

## 2019-10-16 DIAGNOSIS — Z98.890 STATUS POST SURGERY: Primary | ICD-10-CM

## 2019-10-16 PROCEDURE — 97140 MANUAL THERAPY 1/> REGIONS: CPT

## 2019-10-16 PROCEDURE — 97110 THERAPEUTIC EXERCISES: CPT

## 2019-10-16 NOTE — PROGRESS NOTES
Daily Note     Today's date: 10/16/2019  Patient name: Jens Perez  : 1990  MRN: [id number]  Referring provider: Jose Collazo PA-C  Dx:   Encounter Diagnosis     ICD-10-CM    1  Status post surgery Z98 890                   Subjective: Patient reports that he received his dynasplint yesterday and has used it about 5 times so far  1:1 w/ PTA 45 min, indep TE remaining  Objective: See treatment diary below  Manual  9/12 9/16 9/18 x25' 10/2 10/7 10/14 x20' 10/16 x20'   Prone knee flexion st HS   Ext HS hold      Seated knee flex LEFT HS HS         Supine knee ext st HS HS HS hold HS HS HS   Prone knee flex HS                 Exercise Diary  9/12 9/16 9/18 10/2 10/7 10/14 10/16   Nu Step 10' bike 10' Nu 10' Nu  10' Nu 10' Bike 10' nustep  10' bike 10' Nu 10' bike 10' Nu  10' bike 10' Nu 10' bike   Quad sets             SAQ             SLR - flex             Prone knee flexion             SLS             Extended knee flex             sidestepping             Calf/hamstring stretch             Prone knee flex stretch 10'           Prone knee ext Supine 10' 5#w/ MHP Supine 10" 5# w/ MHP Supine 10" w/ strap w  MHP and prone 10' w/ 5# Supine 10' w/ strap w/MHP and prone 10' w/5# Supine 10' w/ strap w/MHP and prone 10' w/ 5# Supine 10' w/ strap and MHP and prone 10' w/ #5 Supine 10' w/ strap and MHP and prone 10' w/ #5                                                                                  Modalities                                                                      Assessment: Pt tolerates more aggressive stretching well  Plan: Continue per plan of care

## 2019-10-21 ENCOUNTER — OFFICE VISIT (OUTPATIENT)
Dept: PHYSICAL THERAPY | Facility: REHABILITATION | Age: 29
End: 2019-10-21
Payer: COMMERCIAL

## 2019-10-21 DIAGNOSIS — Z98.890 STATUS POST SURGERY: Primary | ICD-10-CM

## 2019-10-21 PROCEDURE — 97110 THERAPEUTIC EXERCISES: CPT

## 2019-10-21 PROCEDURE — 97140 MANUAL THERAPY 1/> REGIONS: CPT

## 2019-10-21 NOTE — PROGRESS NOTES
Daily Note     Today's date: 10/21/2019  Patient name: Shanae Avalos  : 1990  MRN: [id number]  Referring provider: Kristyn Carrington PA-C  Dx:   Encounter Diagnosis     ICD-10-CM    1  Status post surgery Z98 890                   Subjective: Pt reports compliance with dynasplint  Objective: See treatment diary below  Manual  9/12 9/16 9/18 x25' 10/2 10/7 10/14 x20' 10/16 x20' 10/21 x20   Prone knee flexion st HS   Ext HS hold       Seated knee flex LEFT HS HS          Supine knee ext st HS HS HS hold HS HS HS HS   Prone knee flex HS                  Exercise Diary  9/12 9/16 9/18 10/2 10/7 10/14 10/16 10/21   Nu Step 10' bike 10' Nu 10' Nu  10' Nu 10' Bike 10' nustep  10' bike 10' Nu 10' bike 10' Nu  10' bike 10' Nu 10' bike  10' bike   Quad sets              SAQ              SLR - flex              Prone knee flexion              SLS              Extended knee flex              sidestepping              Calf/hamstring stretch              Prone knee flex stretch 10'            Prone knee ext Supine 10' 5#w/ MHP Supine 10" 5# w/ MHP Supine 10" w/ strap w  MHP and prone 10' w/ 5# Supine 10' w/ strap w/MHP and prone 10' w/5# Supine 10' w/ strap w/MHP and prone 10' w/ 5# Supine 10' w/ strap and MHP and prone 10' w/ #5 Supine 10' w/ strap and MHP and prone 10' w/ #5    Supine 10' w/ strap and MHP and prone 10' w/ #5                                                                                       Modalities                                                                      Assessment: Patient has good tolerance for all TE and PROM stretching today  Plan: Continue per plan of care

## 2019-10-23 ENCOUNTER — OFFICE VISIT (OUTPATIENT)
Dept: PHYSICAL THERAPY | Facility: REHABILITATION | Age: 29
End: 2019-10-23
Payer: COMMERCIAL

## 2019-10-23 DIAGNOSIS — Z98.890 STATUS POST SURGERY: Primary | ICD-10-CM

## 2019-10-23 PROCEDURE — 97110 THERAPEUTIC EXERCISES: CPT

## 2019-10-23 PROCEDURE — 97140 MANUAL THERAPY 1/> REGIONS: CPT

## 2019-10-23 NOTE — PROGRESS NOTES
Daily Note     Today's date: 10/23/2019  Patient name: Natalee Corley  : 1990  MRN: [id number]  Referring provider: Leland Rodgers PA-C  Dx:   Encounter Diagnosis     ICD-10-CM    1  Status post surgery Z98 890                   Subjective: Pt offers no new complaints at this time  Objective: See treatment diary below  Manual  9/12 9/16 9/18 x25' 10/2 10/7 10/14 x20' 10/16 x20' 10/21 x20 10/23 x30'   Prone knee flexion st HS   Ext HS hold        Seated knee flex LEFT HS HS           Supine knee ext st HS HS HS hold HS HS HS HS HS   Prone knee flex HS                   Exercise Diary  9/12 9/16 9/18 10/2 10/7 10/14 10/16 10/21 10/23   Nu Step 10' bike 10' Nu 10' Nu  10' Nu 10' Bike 10' nustep  10' bike 10' Nu 10' bike 10' Nu  10' bike 10' Nu 10' bike  10' bike 10' Nu   Quad sets               SAQ               SLR - flex               Prone knee flexion               SLS               Extended knee flex               sidestepping               Calf/hamstring stretch               Prone knee flex stretch 10'             Prone knee ext Supine 10' 5#w/ MHP Supine 10" 5# w/ MHP Supine 10" w/ strap w  MHP and prone 10' w/ 5# Supine 10' w/ strap w/MHP and prone 10' w/5# Supine 10' w/ strap w/MHP and prone 10' w/ 5# Supine 10' w/ strap and MHP and prone 10' w/ #5 Supine 10' w/ strap and MHP and prone 10' w/ #5    Supine 10' w/ strap and MHP and prone 10' w/ #5    Supine 10' w/ strap and MHP and prone 10' w/ #5                                                                                            Modalities                                                                      Assessment: Patient did well with tx today  Plan: Continue per plan of care

## 2019-10-28 ENCOUNTER — EVALUATION (OUTPATIENT)
Dept: PHYSICAL THERAPY | Facility: REHABILITATION | Age: 29
End: 2019-10-28
Payer: COMMERCIAL

## 2019-10-28 DIAGNOSIS — Z98.890 STATUS POST SURGERY: Primary | ICD-10-CM

## 2019-10-28 PROCEDURE — 97140 MANUAL THERAPY 1/> REGIONS: CPT | Performed by: PHYSICAL THERAPIST

## 2019-10-28 PROCEDURE — 97110 THERAPEUTIC EXERCISES: CPT | Performed by: PHYSICAL THERAPIST

## 2019-10-28 NOTE — PROGRESS NOTES
Daily Note     Today's date: 10/28/2019  Patient name: Payam Maier  : 1990  MRN: [id number]  Referring provider: Seb Horvath PA-C  Dx:   Encounter Diagnosis     ICD-10-CM    1  Status post surgery Z98 890                   Subjective: Reports using dynasplint at home and feels it is helping  Comes to the clinic without a cane  Objective: See treatment diary below  Manual  9/12 9/16 9/18 x25' 10/2 10/7 10/14 x20' 10/16 x20' 10/21 x20 10/23 x30' 10/28   Prone knee flexion st HS   Ext HS hold         Seated knee flex LEFT HS HS            Supine knee ext st HS HS HS hold HS HS HS HS HS MACHO  10'   Prone knee flex HS           MACHO 5'         Exercise Diary  9/12 9/16 9/18 10/2 10/7 10/14 10/16 10/21 10/23 10/28   Nu Step 10' bike 10' Nu 10' Nu  10' Nu 10' Bike 10' nustep  10' bike 10' Nu 10' bike 10' Nu  10' bike 10' Nu 10' bike  10' bike 10' Nu 10' Bike   Quad sets                SAQ                SLR - flex                Prone knee flexion                SLS                Extended knee flex                sidestepping                Calf/hamstring stretch                Prone knee flex stretch 10'              Prone knee ext Supine 10' 5#w/ MHP Supine 10" 5# w/ MHP Supine 10" w/ strap w  MHP and prone 10' w/ 5# Supine 10' w/ strap w/MHP and prone 10' w/5# Supine 10' w/ strap w/MHP and prone 10' w/ 5# Supine 10' w/ strap and MHP and prone 10' w/ #5 Supine 10' w/ strap and MHP and prone 10' w/ #5    Supine 10' w/ strap and MHP and prone 10' w/ #5    Supine 10' w/ strap and MHP and prone 10' w/ #5    Supine 10' w/MHP and 5# 15'    Leg press prolonged stretch             70# 10 min                                                                             Modalities                                                                      Assessment: Passive extension much improved  Plan: Continue per plan of care

## 2019-10-30 ENCOUNTER — APPOINTMENT (OUTPATIENT)
Dept: PHYSICAL THERAPY | Facility: REHABILITATION | Age: 29
End: 2019-10-30
Payer: COMMERCIAL

## 2019-10-31 ENCOUNTER — TELEPHONE (OUTPATIENT)
Dept: OBGYN CLINIC | Facility: HOSPITAL | Age: 29
End: 2019-10-31

## 2019-10-31 DIAGNOSIS — M00.062 STAPHYLOCOCCAL ARTHRITIS OF LEFT KNEE (HCC): ICD-10-CM

## 2019-10-31 DIAGNOSIS — S72.002A CLOSED FRACTURE OF NECK OF LEFT FEMUR, INITIAL ENCOUNTER (HCC): ICD-10-CM

## 2019-10-31 RX ORDER — GABAPENTIN 100 MG/1
200 CAPSULE ORAL 3 TIMES DAILY
Qty: 90 CAPSULE | Refills: 0 | Status: SHIPPED | OUTPATIENT
Start: 2019-10-31 | End: 2019-12-31 | Stop reason: SDUPTHER

## 2019-10-31 NOTE — TELEPHONE ENCOUNTER
Patient called asking for a refill  Medication Name:  Gabapentin      Dosage of Med:  200 mg     Frequency of Med:  2 pills three times daily     Remaining Medication:  3 pills      Pharmacy and Location:  [facility]   Preferred Callback Phone Number:  [phone]

## 2019-11-06 ENCOUNTER — OFFICE VISIT (OUTPATIENT)
Dept: PHYSICAL THERAPY | Facility: REHABILITATION | Age: 29
End: 2019-11-06
Payer: COMMERCIAL

## 2019-11-06 DIAGNOSIS — Z98.890 STATUS POST SURGERY: Primary | ICD-10-CM

## 2019-11-06 PROCEDURE — 97110 THERAPEUTIC EXERCISES: CPT

## 2019-11-06 PROCEDURE — 97140 MANUAL THERAPY 1/> REGIONS: CPT

## 2019-11-06 NOTE — PROGRESS NOTES
Daily Note     Today's date: 2019  Patient name: Kimi Salazar  : 1990  MRN: [id number]  Referring provider: Martha Key PA-C  Dx:   Encounter Diagnosis     ICD-10-CM    1  Status post surgery Z98 890                   Subjective: Patient reports he has not been using his dynasplit that much due to being stressed  1:1 w/ PTA/PT entire session  Objective: See treatment diary below  Manual  10/14 x20' 10/16 x20' 10/21 x20 10/23 x30' 10/28 11/6   Prone knee flexion st              Seated knee flex LEFT              Supine knee ext st HS HS HS HS MACHO  10' HS   Prone knee flex         MACHO 5' HS         Exercise Diary  10/14 10/16 10/21 10/23 10/28 116   Nu Step 10' Nu  10' bike 10' Nu 10' bike  10' bike 10' Nu 10' Bike 10' Nu   Quad sets              SAQ              SLR - flex              Prone knee flexion              SLS              Extended knee flex              sidestepping              Calf/hamstring stretch              Prone knee flex stretch              Prone knee ext Supine 10' w/ strap and MHP and prone 10' w/ #5 Supine 10' w/ strap and MHP and prone 10' w/ #5    Supine 10' w/ strap and MHP and prone 10' w/ #5    Supine 10' w/ strap and MHP and prone 10' w/ #5    Supine 10' w/MHP and 5# 15' Supine 10' w/ MHP and strap on table, prone 5# x10'    Leg press prolonged stretch         70# 10 min 70# x10'                                                                     Modalities                                                                         Assessment: Tolerated treatment well  Patient would benefit from continued PT  Plan: Continue per plan of care

## 2019-11-11 ENCOUNTER — OFFICE VISIT (OUTPATIENT)
Dept: PHYSICAL THERAPY | Facility: REHABILITATION | Age: 29
End: 2019-11-11
Payer: COMMERCIAL

## 2019-11-11 DIAGNOSIS — Z98.890 STATUS POST SURGERY: Primary | ICD-10-CM

## 2019-11-11 PROCEDURE — 97140 MANUAL THERAPY 1/> REGIONS: CPT

## 2019-11-11 PROCEDURE — 97110 THERAPEUTIC EXERCISES: CPT

## 2019-11-11 NOTE — PROGRESS NOTES
Daily Note     Today's date: 2019  Patient name: Payam Maier  : 1990  MRN: [id number]  Referring provider: Seb Horvath PA-C  Dx:   Encounter Diagnosis     ICD-10-CM    1  Status post surgery Z98 890                   Subjective: Patient reports that he has been compliant with HEP and rico splint usage  Objective: See treatment diary below  Manual  10/14 x20' 10/16 x20' 10/21 x20 10/23 x30' 10/28 11/6 11/11   Prone knee flexion st                Seated knee flex LEFT                Supine knee ext st HS HS HS HS MACHO  10' HS HS   Prone knee flex         MACHO 5' HS HS         Exercise Diary  10/14 10/16 10/21 10/23 10/28 11/6 11/11   Nu Step 10' Nu  10' bike 10' Nu 10' bike  10' bike 10' Nu 10' Bike 10' Nu 10' Nu   Quad sets                SAQ                SLR - flex                Prone knee flexion                SLS                Extended knee flex                sidestepping                Calf/hamstring stretch                Prone knee flex stretch                Prone knee ext Supine 10' w/ strap and MHP and prone 10' w/ #5 Supine 10' w/ strap and MHP and prone 10' w/ #5    Supine 10' w/ strap and MHP and prone 10' w/ #5    Supine 10' w/ strap and MHP and prone 10' w/ #5    Supine 10' w/MHP and 5# 15' Supine 10' w/ MHP and strap on table, prone 5# x10' Supine 10' w/ MHP and strap on table, prone 5# x10'    Leg press prolonged stretch         70# 10 min 70# x10'                                                                              Modalities                                                                         Assessment: Tolerated treatment fair  Patient would benefit from continued PT      Plan: Continue per plan of care

## 2019-11-13 ENCOUNTER — OFFICE VISIT (OUTPATIENT)
Dept: PHYSICAL THERAPY | Facility: REHABILITATION | Age: 29
End: 2019-11-13
Payer: COMMERCIAL

## 2019-11-13 ENCOUNTER — TELEPHONE (OUTPATIENT)
Dept: OBGYN CLINIC | Facility: HOSPITAL | Age: 29
End: 2019-11-13

## 2019-11-13 DIAGNOSIS — Z98.890 STATUS POST SURGERY: Primary | ICD-10-CM

## 2019-11-13 PROCEDURE — 97140 MANUAL THERAPY 1/> REGIONS: CPT

## 2019-11-13 PROCEDURE — 97110 THERAPEUTIC EXERCISES: CPT

## 2019-11-13 NOTE — TELEPHONE ENCOUNTER
Patient sees Dr Syeda Bonner  He was calling to see if there were any earlier appointments  We will be calling back periodically to check for cancellations

## 2019-11-13 NOTE — PROGRESS NOTES
Daily Note     Today's date: 2019  Patient name: Dora Lemus  : 1990  MRN: [id number]  Referring provider: Jazmín Arnold PA-C  Dx:   Encounter Diagnosis     ICD-10-CM    1  Status post surgery Z98 890         1:1 with PTA CR 3:50-4:50  Subjective: Increased pain today  Patient forgot to take pain medication  Objective: See treatment diary below  Manual  10/14 x20' 10/16 x20' 10/21 x20 10/23 x30' 10/28 11/6 11/11 11/13  30 mins   Prone knee flexion st                 Seated knee flex LEFT                 Supine knee ext st HS HS HS HS MACHO  10' HS HS CR  20 mins   Prone knee flex         MACHO 5' HS HS CR  10 mins         Exercise Diary  10/14 10/16 10/21 10/23 10/28 11/6 11/11 11/13   Nu Step 10' Nu  10' bike 10' Nu 10' bike  10' bike 10' Nu 10' Bike 10' Nu 10' Nu Nu  10 mins   Quad sets                 SAQ                 SLR - flex                 Prone knee flexion                 SLS                 Extended knee flex                 sidestepping                 Calf/hamstring stretch                 Prone knee flex stretch                 Prone knee ext Supine 10' w/ strap and MHP and prone 10' w/ #5 Supine 10' w/ strap and MHP and prone 10' w/ #5    Supine 10' w/ strap and MHP and prone 10' w/ #5    Supine 10' w/ strap and MHP and prone 10' w/ #5    Supine 10' w/MHP and 5# 15' Supine 10' w/ MHP and strap on table, prone 5# x10' Supine 10' w/ MHP and strap on table, prone 5# x10' Supine  10' w/  MHP  And strap on table,  Prone  5# x10'    Leg press prolonged stretch         70# 10 min 70# x10'                                                                                   Modalities                                                                         Assessment: Tolerated treatment poor  Manuals more painful today  Patient would benefit from continued PT      Plan: Continue per plan of care

## 2019-11-13 NOTE — TELEPHONE ENCOUNTER
Patient is requesting paperwork done same day as tuesdays appt  Would like a call back   [phone]

## 2019-11-14 ENCOUNTER — TELEPHONE (OUTPATIENT)
Dept: OBGYN CLINIC | Facility: CLINIC | Age: 29
End: 2019-11-14

## 2019-11-14 NOTE — TELEPHONE ENCOUNTER
Patient would like to be contacted in regards to paperwork  If you could give him a call at the number on file ending in [phone]   Thank you

## 2019-11-18 ENCOUNTER — OFFICE VISIT (OUTPATIENT)
Dept: PHYSICAL THERAPY | Facility: REHABILITATION | Age: 29
End: 2019-11-18
Payer: COMMERCIAL

## 2019-11-18 DIAGNOSIS — Z98.890 STATUS POST SURGERY: Primary | ICD-10-CM

## 2019-11-18 PROCEDURE — 97140 MANUAL THERAPY 1/> REGIONS: CPT | Performed by: PHYSICAL THERAPIST

## 2019-11-18 PROCEDURE — 97112 NEUROMUSCULAR REEDUCATION: CPT | Performed by: PHYSICAL THERAPIST

## 2019-11-18 PROCEDURE — 97110 THERAPEUTIC EXERCISES: CPT | Performed by: PHYSICAL THERAPIST

## 2019-11-18 NOTE — PROGRESS NOTES
Daily Note     Today's date: 2019  Patient name: Alfredo Nolasco  : 1990  MRN: [id number]  Referring provider: Eric Stevenson PA-C  Dx:   Encounter Diagnosis     ICD-10-CM    1  Status post surgery Z98 890         1:1 with PTA CR 3:50-4:50  Subjective: No new complaints      Objective: See treatment diary below  Manual  10/14 x20' 10/16 x20' 10/21 x20 10/23 x30' 10/28 11/6 11/11 11/13  30 mins  15'    Prone knee flexion st                  Seated knee flex LEFT                  Supine knee ext st HS HS HS HS MACHO  10' HS HS CR  20 mins 15' '   Prone knee flex         MACHO 5' HS HS CR  10 mins          Exercise Diary  10/14 10/16 10/21 10/23 10/28 11/6 11/11 11/13 11/18   Nu Step 10' Nu  10' bike 10' Nu 10' bike  10' bike 10' Nu 10' Bike 10' Nu 10' Nu Nu  10 mins Nu step x12'   Quad sets                  SAQ               3s x20   SLR - flex                  Prone knee flexion               30   SLS                  Extended knee flex                  sidestepping                  Calf/hamstring stretch                  Prone knee flex stretch                  Prone knee ext Supine 10' w/ strap and MHP and prone 10' w/ #5 Supine 10' w/ strap and MHP and prone 10' w/ #5    Supine 10' w/ strap and MHP and prone 10' w/ #5    Supine 10' w/ strap and MHP and prone 10' w/ #5    Supine 10' w/MHP and 5# 15' Supine 10' w/ MHP and strap on table, prone 5# x10' Supine 10' w/ MHP and strap on table, prone 5# x10' Supine  10' w/  MHP  And strap on table,  Prone  5# x10'     Leg press prolonged stretch         70# 10 min 70# x10'       Prone hang               10' 5#     Treadmill               10'                                               Modalities                                                                         Assessment: Focus now more on active movement    Plan: Continue per plan of care

## 2019-11-19 ENCOUNTER — OFFICE VISIT (OUTPATIENT)
Dept: OBGYN CLINIC | Facility: HOSPITAL | Age: 29
End: 2019-11-19
Payer: COMMERCIAL

## 2019-11-19 ENCOUNTER — HOSPITAL ENCOUNTER (OUTPATIENT)
Dept: RADIOLOGY | Facility: HOSPITAL | Age: 29
Discharge: HOME | End: 2019-11-19
Attending: ORTHOPAEDIC SURGERY
Payer: COMMERCIAL

## 2019-11-19 VITALS — SYSTOLIC BLOOD PRESSURE: 134 MMHG | DIASTOLIC BLOOD PRESSURE: 87 MMHG | HEART RATE: 73 BPM

## 2019-11-19 DIAGNOSIS — Z98.890 STATUS POST SURGERY: ICD-10-CM

## 2019-11-19 DIAGNOSIS — M79.605 PAIN OF LEFT LOWER EXTREMITY: ICD-10-CM

## 2019-11-19 DIAGNOSIS — Z87.81 S/P ORIF (OPEN REDUCTION INTERNAL FIXATION) FRACTURE: Primary | ICD-10-CM

## 2019-11-19 DIAGNOSIS — M25.562 ACUTE PAIN OF LEFT KNEE: ICD-10-CM

## 2019-11-19 DIAGNOSIS — Z98.890 S/P ORIF (OPEN REDUCTION INTERNAL FIXATION) FRACTURE: Primary | ICD-10-CM

## 2019-11-19 PROCEDURE — 73552 X-RAY EXAM OF FEMUR 2/>: CPT

## 2019-11-19 PROCEDURE — 73560 X-RAY EXAM OF KNEE 1 OR 2: CPT

## 2019-11-19 PROCEDURE — 99213 OFFICE O/P EST LOW 20 MIN: CPT | Performed by: ORTHOPAEDIC SURGERY

## 2019-11-19 NOTE — LETTER
November 19, 2019     Patient: John Alonso   YOB: 1990   Date of Visit: 11/19/2019       To Whom it May Concern:    John Alonso is under my professional care  He was seen in my office on 11/19/2019  He may return to full duty on 11/26/19  If you have any questions or concerns, please don't hesitate to call           Sincerely,          Caffie Meckel, MD        CC: No Recipients
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
## 2019-11-21 ENCOUNTER — OFFICE VISIT (OUTPATIENT)
Dept: PHYSICAL THERAPY | Facility: REHABILITATION | Age: 29
End: 2019-11-21
Payer: COMMERCIAL

## 2019-11-21 ENCOUNTER — TELEPHONE (OUTPATIENT)
Dept: OBGYN CLINIC | Facility: HOSPITAL | Age: 29
End: 2019-11-21

## 2019-11-21 DIAGNOSIS — Z98.890 STATUS POST SURGERY: Primary | ICD-10-CM

## 2019-11-21 PROCEDURE — 97110 THERAPEUTIC EXERCISES: CPT | Performed by: PHYSICAL THERAPIST

## 2019-11-21 PROCEDURE — 97140 MANUAL THERAPY 1/> REGIONS: CPT | Performed by: PHYSICAL THERAPIST

## 2019-11-21 NOTE — PROGRESS NOTES
Daily Note     Today's date: 2019  Patient name: Natalee Corley  : 1990  MRN: [id number]  Referring provider: Leland Rodgers PA-C  Dx:   Encounter Diagnosis     ICD-10-CM    1  Status post surgery Z98 890         1:1 with PTA CR 3:50-4:50  Subjective: Reports he was placed back on full duty and will likely undergo open manipulation in 2 months once he has enough FMLA time built up        Objective: See treatment diary below  [facility] 10/14 x20' 10/16 x20' 10/21 x20 10/23 x30' 10/28 11/6 11/11 11/13  30 mins  15 25'   Patellar mob                5'   Seated knee flex LEFT                   Supine knee ext st HS HS HS HS MACHO  10' HS HS CR  20 mins 15' ' 10'   Prone knee flex         MACHO 5' HS HS CR  10 mins  10'         Exercise Diary  10/14 10/16 10/21 10/23 10/28 11/6 11/11 11/13 11/18 11/21   Nu Step 10' Nu  10' bike 10' Nu 10' bike  10' bike 10' Nu 10' Bike 10' Nu 10' Nu Nu  10 mins Nu step x12' Nu step x12   Quad sets                   SAQ               3s x20 3s x20   SLR - flex                   Prone knee flexion               30 30   SLS                   Extended knee flex                   sidestepping                   Calf/hamstring stretch                   Prone knee flex stretch                   Prone knee ext Supine 10' w/ strap and MHP and prone 10' w/ #5 Supine 10' w/ strap and MHP and prone 10' w/ #5    Supine 10' w/ strap and MHP and prone 10' w/ #5    Supine 10' w/ strap and MHP and prone 10' w/ #5    Supine 10' w/MHP and 5# 15' Supine 10' w/ MHP and strap on table, prone 5# x10' Supine 10' w/ MHP and strap on table, prone 5# x10' Supine  10' w/  MHP  And strap on table,  Prone  5# x10'  10' 5#    Leg press prolonged stretch         70# 10 min 70# x10'    70# 10'    Prone hang               10' 5#      Treadmill               10'                                                  Modalities                                                                       Assessment: Able to improve patellar mobility with mobs  /     Plan: Continue per plan of care

## 2019-11-21 NOTE — TELEPHONE ENCOUNTER
Patient sees Dr Brandi Jackson  He is calling because he dropped off paperwork at the office yesterday for the Dr to fill out for his employer about the current medication he is on  He also wanted the Dr to know that his employer also stated he can still work full duty as long as he doesn't take his medication during the workday, but can take it towards the end of his shift      He would like a call when it is ready at [phone]

## 2019-11-25 ENCOUNTER — OFFICE VISIT (OUTPATIENT)
Dept: PHYSICAL THERAPY | Facility: REHABILITATION | Age: 29
End: 2019-11-25
Payer: COMMERCIAL

## 2019-11-25 DIAGNOSIS — Z98.890 STATUS POST SURGERY: Primary | ICD-10-CM

## 2019-11-25 PROCEDURE — 97110 THERAPEUTIC EXERCISES: CPT

## 2019-11-25 PROCEDURE — 97140 MANUAL THERAPY 1/> REGIONS: CPT

## 2019-11-25 NOTE — PROGRESS NOTES
Daily Note     Today's date: 2019  Patient name: Jojo Maldonado  : 1990  MRN: [id number]  Referring provider: Sudhir Wade PA-C  Dx:   Encounter Diagnosis     ICD-10-CM    1  Status post surgery Z98 890         1:1 with PTA CR 5:15- 6:25  Subjective: Patient returning to work full time tomorrow         Objective: See treatment diary below      Manual  10/14 x20' 10/16 x20' 10/21 x20 10/23 x30' 10/28 11/6 11/11 11/13  30 mins 11/18 15'  11/21 25'   25 mins   Patellar mob                5' CR  ROM  5 mins   Seated knee flex LEFT                    Supine knee ext st HS HS HS HS MACHO  10' HS HS CR  20 mins 15' ' 10' CR  10 mins   Prone knee flex         MACHO 5' HS HS CR  10 mins  10' CR  10 mins         Exercise Diary  10/14 10/16 10/21 10/23 10/28 11/6 11/11 11/13 11/18 11/21 11/25   Nu Step 10' Nu  10' bike 10' Nu 10' bike  10' bike 10' Nu 10' Bike 10' Nu 10' Nu Nu  10 mins Nu step x12' Nu step x12 NuStep  10 mins   Quad sets                    SAQ               3s x20 3s x20 3"x20   SLR - flex                    Prone knee flexion               30 30 30   SLS                    Extended knee flex                    sidestepping                    Calf/hamstring stretch                    Prone knee flex stretch                    Prone knee ext Supine 10' w/ strap and MHP and prone 10' w/ #5 Supine 10' w/ strap and MHP and prone 10' w/ #5    Supine 10' w/ strap and MHP and prone 10' w/ #5    Supine 10' w/ strap and MHP and prone 10' w/ #5    Supine 10' w/MHP and 5# 15' Supine 10' w/ MHP and strap on table, prone 5# x10' Supine 10' w/ MHP and strap on table, prone 5# x10' Supine  10' w/  MHP  And strap on table,  Prone  5# x10'  10' 5# 10' 5#    Leg press prolonged stretch         70# 10 min 70# x10'    70# 10' NV    Prone hang               10' 5#       Treadmill               10'  5 mins                                                   Modalities                                       Assessment: Continue with sustained stretching and AROM  Plan: Continue per plan of care

## 2019-11-27 ENCOUNTER — APPOINTMENT (OUTPATIENT)
Dept: PHYSICAL THERAPY | Facility: REHABILITATION | Age: 29
End: 2019-11-27
Payer: COMMERCIAL

## 2019-12-02 ENCOUNTER — APPOINTMENT (OUTPATIENT)
Dept: PHYSICAL THERAPY | Facility: REHABILITATION | Age: 29
End: 2019-12-02
Payer: COMMERCIAL

## 2019-12-03 ENCOUNTER — OFFICE VISIT (OUTPATIENT)
Dept: PHYSICAL THERAPY | Facility: REHABILITATION | Age: 29
End: 2019-12-03
Payer: COMMERCIAL

## 2019-12-03 DIAGNOSIS — Z98.890 STATUS POST SURGERY: Primary | ICD-10-CM

## 2019-12-03 PROCEDURE — 97110 THERAPEUTIC EXERCISES: CPT

## 2019-12-03 PROCEDURE — 97140 MANUAL THERAPY 1/> REGIONS: CPT

## 2019-12-03 NOTE — PROGRESS NOTES
Daily Note     Today's date: 12/3/2019  Patient name: Tomas Noriega  : 1990  MRN: [id number]  Referring provider: Estella Mike PA-C  Dx:   Encounter Diagnosis     ICD-10-CM    1  Status post surgery Z98 890                  Subjective: Patient reports feeling okay after returning to full duty work  States that he is just tired  No new complaints this visit        Objective: See treatment diary below      [facility] 10/14 x20' 10/16 x20' 10/21 x20 10/23 x30' 10/28 11/6 11/11 11/13  30 mins  15 25'   25 mins 12/3   Patellar mob                5' CR  ROM  5 mins SD 5'   Seated knee flex LEFT                     Supine knee ext st HS HS HS HS MACHO  10' HS HS CR  20 mins 15' ' 10' CR  10 mins SD 10'   Prone knee flex         MACHO 5' HS HS CR  10 mins  10' CR  10 mins SD 10'   L Calf / HA stretch            SD 5'         Exercise Diary  10/14 10/16 10/21 10/23 10/28 11/6 11/11 11/13 11/18 11/21 11/25 12/3   Nu Step 10' Nu  10' bike 10' Nu 10' bike  10' bike 10' Nu 10' Bike 10' Nu 10' Nu Nu  10 mins Nu step x12' Nu step x12 NuStep  10 mins Nu step 10'   Quad sets                     SAQ               3s x20 3s x20 3"x20 3"x20   SLR - flex                     Prone knee flexion               30 30 30 30   SLS                     Extended knee flex                     sidestepping                     Calf/hamstring stretch                     Prone knee flex stretch                     Prone knee ext Supine 10' w/ strap and MHP and prone 10' w/ #5 Supine 10' w/ strap and MHP and prone 10' w/ #5    Supine 10' w/ strap and MHP and prone 10' w/ #5    Supine 10' w/ strap and MHP and prone 10' w/ #5    Supine 10' w/MHP and 5# 15' Supine 10' w/ MHP and strap on table, prone 5# x10' Supine 10' w/ MHP and strap on table, prone 5# x10' Supine  10' w/  MHP  And strap on table,  Prone  5# x10'  10' 5# 10' 5# 10'x5    Leg press prolonged stretch         70# 10 min 70# x10'    70# 10' NV     Prone hang               10' 5#        Treadmill               10'  5 mins                                                      Modalities                                         Assessment: Pt tolerated treatment fair this visit  Pt tolerated PROM fair with increased discomfort reported this visit  Improvements in L knee flexion and extension ROM following stretching  Pt would benefit from continued skilled PT in order to progress reduce impairments and improve functional mobility  Plan: Continue per plan of care

## 2019-12-05 ENCOUNTER — OFFICE VISIT (OUTPATIENT)
Dept: PHYSICAL THERAPY | Facility: REHABILITATION | Age: 29
End: 2019-12-05
Payer: COMMERCIAL

## 2019-12-05 DIAGNOSIS — Z98.890 STATUS POST SURGERY: Primary | ICD-10-CM

## 2019-12-05 PROCEDURE — 97140 MANUAL THERAPY 1/> REGIONS: CPT

## 2019-12-05 PROCEDURE — 97110 THERAPEUTIC EXERCISES: CPT

## 2019-12-05 PROCEDURE — 97112 NEUROMUSCULAR REEDUCATION: CPT

## 2019-12-05 NOTE — PROGRESS NOTES
Daily Note     Today's date: 2019  Patient name: Bruna Muhammad  : 1990  MRN: [id number]  Referring provider: Lou Valverde PA-C  Dx:   Encounter Diagnosis     ICD-10-CM    1  Status post surgery Z98 890                   Subjective: Pt offers no change in status upon arrival      Objective: See treatment diary below      Assessment: Tolerated treatment well  Patient would benefit from continued PT  Pt continues to be limited with flexion and extension  Pt is improving with muscle guarding  Pt  Reported no overall increase in pain at end of session  Pt 1:1 with PTA [phone], IEP for remainder  Plan: Continue per plan of care          Manual    30 mins  15'   25'   25 mins 12/3 12/5     Patellar mob      5' CR  ROM  5 mins SD 5' KP 4' rom     Seated knee flex LEFT              Supine knee ext st HS HS CR  20 mins 15' ' 10' CR  10 mins SD 10' KP 5'     Prone knee flex HS HS CR  10 mins  10' CR  10 mins SD 10' KP 5'     L Calf / SERRANO stretch       SD 5' KP 3'           Exercise Diary  11/6 11/11 11/13 11/18 11/21 11/25 12/3 12/5     Nu Step 10' Nu 10' Nu Nu  10 mins Nu step x12' Nu step x12 NuStep  10 mins Nu step 10' 10' L10     Quad sets              SAQ     3s x20 3s x20 3"x20 3"x20 20x3"     SLR - flex              Prone knee flexion     30 30 30 30 30     SLS              Extended knee flex              sidestepping              Calf/hamstring stretch              Prone knee flex stretch              Prone knee ext Supine 10' w/ MHP and strap on table, prone 5# x10' Supine 10' w/ MHP and strap on table, prone 5# x10' Supine  10' w/  MHP  And strap on table,  Prone  5# x10'  10' 5# 10' 5# 10'x5 10' 5#      Leg press prolonged stretch 70# x10'    70# 10' NV        Prone hang     10' 5#           Treadmill     10'  5 mins                                           Modalities

## 2019-12-09 ENCOUNTER — OFFICE VISIT (OUTPATIENT)
Dept: PHYSICAL THERAPY | Facility: REHABILITATION | Age: 29
End: 2019-12-09
Payer: COMMERCIAL

## 2019-12-09 DIAGNOSIS — Z98.890 STATUS POST SURGERY: Primary | ICD-10-CM

## 2019-12-09 PROCEDURE — 97110 THERAPEUTIC EXERCISES: CPT

## 2019-12-09 PROCEDURE — 97112 NEUROMUSCULAR REEDUCATION: CPT

## 2019-12-09 PROCEDURE — 97140 MANUAL THERAPY 1/> REGIONS: CPT

## 2019-12-09 NOTE — PROGRESS NOTES
Daily Note     Today's date: 2019  Patient name: Santi Orellana  : 1990  MRN: [id number]  Referring provider: Humberto Ritchie PA-C  Dx:   Encounter Diagnosis     ICD-10-CM    1  Status post surgery Z98 890                   Subjective: Pt  reports no change in status upon arrival       Objective: See treatment diary below      Assessment: Tolerated treatment well  Patient would benefit from continued PT  Pt improving with tolerance to prolonged stretching  Pt  able to complete all exercises with no increase in pain during or after session  Pt 1:1 with PTA [phone], IEP for remainder  Plan: Continue per plan of care        Manual    30 mins  15'   25'   25 mins 12/3 12/5 12/9    Patellar mob      5' CR  ROM  5 mins SD 5' KP 4' rom KP 4' rom    Seated knee flex LEFT              Supine knee ext st HS HS CR  20 mins 15' ' 10' CR  10 mins SD 10' KP 5' KP 5'    Prone knee flex HS HS CR  10 mins  10' CR  10 mins SD 10' KP 5' np    L Calf / SERRANO stretch       SD 5' KP 3' KP 2'          Exercise Diary  11/6 11/11 11/13 11/18 11/21 11/25 12/3 12/5 12/9    Nu Step 10' Nu 10' Nu Nu  10 mins Nu step x12' Nu step x12 NuStep  10 mins Nu step 10' 10' L10 10' L10    Quad sets              SAQ     3s x20 3s x20 3"x20 3"x20 20x3" 30x5"    SLR - flex              Prone knee flexion     30 30 30 30 30 30    SLS              Extended knee flex              sidestepping              Calf/hamstring stretch              Prone knee flex stretch              Prone knee ext Supine 10' w/ MHP and strap on table, prone 5# x10' Supine 10' w/ MHP and strap on table, prone 5# x10' Supine  10' w/  MHP  And strap on table,  Prone  5# x10'  10' 5# 10' 5# 10'x5 10' 5# 10' supine w/strap on table     Leg press prolonged stretch 70# x10'    70# 10' NV        Prone hang     10' 5#           Treadmill     10'  5 mins                                           Modalities

## 2019-12-12 ENCOUNTER — APPOINTMENT (OUTPATIENT)
Dept: PHYSICAL THERAPY | Facility: REHABILITATION | Age: 29
End: 2019-12-12
Payer: COMMERCIAL

## 2019-12-18 ENCOUNTER — OFFICE VISIT (OUTPATIENT)
Dept: PHYSICAL THERAPY | Facility: REHABILITATION | Age: 29
End: 2019-12-18
Payer: COMMERCIAL

## 2019-12-18 DIAGNOSIS — Z98.890 STATUS POST SURGERY: Primary | ICD-10-CM

## 2019-12-18 PROCEDURE — 97112 NEUROMUSCULAR REEDUCATION: CPT

## 2019-12-18 PROCEDURE — 97140 MANUAL THERAPY 1/> REGIONS: CPT

## 2019-12-18 PROCEDURE — 97110 THERAPEUTIC EXERCISES: CPT

## 2019-12-18 NOTE — PROGRESS NOTES
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  Objective: See treatment diary below      Assessment: Tolerated treatment fair  Did not tolerate AAROM as well today  Patient would benefit from continued PT  Plan: Continue per plan of care        Manual    30 mins  15'   25'   25 mins 12/3 12/5 12/9 12/18   Patellar mob      5' CR  ROM  5 mins SD 5' KP 4' rom KP 4' rom CR  4 mins   Seated knee flex LEFT              Supine knee ext st HS HS CR  20 mins 15' ' 10' CR  10 mins SD 10' KP 5' KP 5' CR  8 mins   Prone knee flex HS HS CR  10 mins  10' CR  10 mins SD 10' KP 5' np NP   L Calf / SERRANO stretch       SD 5' KP 3' KP 2' CR  8 mins           Exercise Diary  11/6 11/11 11/13 11/18 11/21 11/25 12/3 12/5 12/9 12/18   Nu Step 10' Nu 10' Nu Nu  10 mins Nu step x12' Nu step x12 NuStep  10 mins Nu step 10' 10' L10 10' L10 L10  10 mins   Quad sets              SAQ     3s x20 3s x20 3"x20 3"x20 20x3" 30x5" 5"x30   SLR - flex              Prone knee flexion     30 30 30 30 30 30 30   SLS              Extended knee flex              sidestepping              Calf/hamstring stretch              Prone knee flex stretch              Prone knee ext Supine 10' w/ MHP and strap on table, prone 5# x10' Supine 10' w/ MHP and strap on table, prone 5# x10' Supine  10' w/  MHP  And strap on table,  Prone  5# x10'  10' 5# 10' 5# 10'x5 10' 5# 10' supine w/strap on table 10 mins  Supine  W/strap  on table    Leg press prolonged stretch 70# x10'    70# 10' NV        Prone hang     10' 5#           Treadmill     10'  5 mins                                           Modalities

## 2019-12-19 ENCOUNTER — OFFICE VISIT (OUTPATIENT)
Dept: PHYSICAL THERAPY | Facility: REHABILITATION | Age: 29
End: 2019-12-19
Payer: COMMERCIAL

## 2019-12-19 DIAGNOSIS — Z98.890 STATUS POST SURGERY: Primary | ICD-10-CM

## 2019-12-19 PROCEDURE — 97140 MANUAL THERAPY 1/> REGIONS: CPT | Performed by: PHYSICAL THERAPIST

## 2019-12-19 PROCEDURE — 97110 THERAPEUTIC EXERCISES: CPT | Performed by: PHYSICAL THERAPIST

## 2019-12-19 NOTE — PROGRESS NOTES
Daily Note     Today's date: 2019  Patient name: Daryl Jones  : 1990  MRN: [id number]  Referring provider: Carlos aCvanaugh PA-C  Dx:   Encounter Diagnosis     ICD-10-CM    1  Status post surgery Z98 890                    Subjective: No new complaints  Remains stiff    Objective: See treatment diary below      Assessment: Better tolerance to stretching today, but remains limited  Plan: Continue per plan of care        Manual    30 mins  15'   25'   25 mins 12/3 12/5 12/9 12/18 12/19   Patellar mob      5' CR  ROM  5 mins SD 5' KP 4' rom KP 4' rom CR  4 mins MACHO 4'   Seated knee flex LEFT               Supine knee ext st HS HS CR  20 mins 15' ' 10' CR  10 mins SD 10' KP 5' KP 5' CR  8 mins MACHO 8'   Prone knee flex HS HS CR  10 mins  10' CR  10 mins SD 10' KP 5' np NP MACHO 5'   L Calf / SERRANO stretch       SD 5' KP 3' KP 2' CR  8 mins   MACHO          Exercise Diary  11/6 11/11 11/13 11/18 11/21 11/25 12/3 12/5 12/9 12/18 12/19   Nu Step 10' Nu 10' Nu Nu  10 mins Nu step x12' Nu step x12 NuStep  10 mins Nu step 10' 10' L10 10' L10 L10  10 mins L10 x10   Quad sets               SAQ     3s x20 3s x20 3"x20 3"x20 20x3" 30x5" 5"x30 5" x30   SLR - flex               Prone knee flexion     30 30 30 30 30 30 30 30   SLS               Extended knee flex               sidestepping               Calf/hamstring stretch               Prone knee flex stretch               Prone knee ext Supine 10' w/ MHP and strap on table, prone 5# x10' Supine 10' w/ MHP and strap on table, prone 5# x10' Supine  10' w/  MHP  And strap on table,  Prone  5# x10'  10' 5# 10' 5# 10'x5 10' 5# 10' supine w/strap on table 10 mins  Supine  W/strap  on table With [address] 5# Prone hang x12'    Leg press prolonged stretch 70# x10'    70# 10' NV         Prone hang     10' 5#            Treadmill     10'  5 mins                                              Modalities

## 2019-12-26 ENCOUNTER — OFFICE VISIT (OUTPATIENT)
Dept: PHYSICAL THERAPY | Facility: REHABILITATION | Age: 29
End: 2019-12-26
Payer: COMMERCIAL

## 2019-12-26 DIAGNOSIS — Z98.890 STATUS POST SURGERY: Primary | ICD-10-CM

## 2019-12-26 PROCEDURE — 97140 MANUAL THERAPY 1/> REGIONS: CPT

## 2019-12-26 PROCEDURE — 97110 THERAPEUTIC EXERCISES: CPT

## 2019-12-26 PROCEDURE — 97112 NEUROMUSCULAR REEDUCATION: CPT

## 2019-12-26 NOTE — PROGRESS NOTES
Daily Note     Today's date: 2019  Patient name: Alfredo Nolasco  : 1990  MRN: [id number]  Referring provider: Eric Stevenson PA-C  Dx:   Encounter Diagnosis     ICD-10-CM    1  Status post surgery Z98 890                   Subjective: Pt states he's been able to get a little more mobility throughout the day, has not been taking gabapentin and has been taking ibuprofen instead      Objective: See treatment diary below      Assessment: Tolerated treatment well  Patient would benefit from continued PT   Pt  able to complete all exercises with no increase in pain during or after session  Pt remains limited with both flex and ext  Pt 1:1 with PTA [phone], heat for remainder  Plan: Continue per plan of care        Manual    25 mins 12/3 12/5 12/9 12/18 12/19 12/26      Patellar mob CR  ROM  5 mins SD 5' KP 4' rom KP 4' rom CR  4 mins MACHO 4' KP 2'      Seated knee flex LEFT             Supine knee ext st CR  10 mins SD 10' KP 5' KP 5' CR  8 mins MACHO 8' KP 5'      Prone knee flex CR  10 mins SD 10' KP 5' np NP MACHO 5' KP 5'      L Calf / HA stretch  SD 5' KP 3' KP 2' CR  8 mins   MACHO  KP 2'            Exercise Diary  11/25 12/3 12/5 12/9 12/18 12/19 12/26      Nu Step NuStep  10 mins Nu step 10' 10' L10 10' L10 L10  10 mins L10 x10 10' L10      Quad sets             SAQ 3"x20 3"x20 20x3" 30x5" 5"x30 5" x30 30x 5"      SLR - flex             Prone knee flexion 30 30 30 30 30 30 30      SLS             Extended knee flex             sidestepping             Calf/hamstring stretch             Prone knee flex stretch             Prone knee ext 10' 5# 10'x5 10' 5# 10' supine w/strap on table 10 mins  Supine  W/strap  on table With [address] 5# Prone hang x12' With MH 5# prone hang 10'       Leg press prolonged stretch NV             Prone hang              Treadmill 5 mins             Supine extension stretch with strap on table       10'                          Modalities

## 2019-12-30 ENCOUNTER — OFFICE VISIT (OUTPATIENT)
Dept: PHYSICAL THERAPY | Facility: REHABILITATION | Age: 29
End: 2019-12-30
Payer: COMMERCIAL

## 2019-12-30 DIAGNOSIS — Z98.890 STATUS POST SURGERY: Primary | ICD-10-CM

## 2019-12-30 PROCEDURE — 97110 THERAPEUTIC EXERCISES: CPT | Performed by: PHYSICAL THERAPIST

## 2019-12-30 PROCEDURE — 97140 MANUAL THERAPY 1/> REGIONS: CPT | Performed by: PHYSICAL THERAPIST

## 2019-12-31 ENCOUNTER — TELEPHONE (OUTPATIENT)
Dept: OBGYN CLINIC | Facility: HOSPITAL | Age: 29
End: 2019-12-31

## 2019-12-31 DIAGNOSIS — M00.062 STAPHYLOCOCCAL ARTHRITIS OF LEFT KNEE (HCC): ICD-10-CM

## 2019-12-31 DIAGNOSIS — S72.002A CLOSED FRACTURE OF NECK OF LEFT FEMUR, INITIAL ENCOUNTER (HCC): ICD-10-CM

## 2019-12-31 RX ORDER — METHOCARBAMOL 750 MG/1
750 TABLET, FILM COATED ORAL EVERY 6 HOURS PRN
Qty: 120 TABLET | Refills: 0 | Status: SHIPPED | OUTPATIENT
Start: 2019-12-31 | End: 2020-06-04

## 2019-12-31 RX ORDER — GABAPENTIN 100 MG/1
200 CAPSULE ORAL 3 TIMES DAILY
Qty: 90 CAPSULE | Refills: 0 | Status: SHIPPED | OUTPATIENT
Start: 2019-12-31 | End: 2020-06-04

## 2019-12-31 NOTE — TELEPHONE ENCOUNTER
Pt contacted Call Center requested refill of two medications  PT has provided a new pharmacy for the medication to go to          1) Medication Name: gabapentin (NEURONTIN)      Dosage of Med: 100 mg      Frequency of Med: as directed      Remaining Medication: 0        2) Medication Name: methocarbamol (ROBAXIN)     Dosage of Med: 750 mg      Frequency of Med: 1 per day      Remaining Medication: 5      Pharmacy and Location: [address] [place] [address], [address] [place] [address],1 [phone]        Pt  Preferred Callback Phone Number: [phone]      Thank you

## 2020-01-09 ENCOUNTER — OFFICE VISIT (OUTPATIENT)
Dept: PHYSICAL THERAPY | Facility: REHABILITATION | Age: 30
End: 2020-01-09
Payer: COMMERCIAL

## 2020-01-09 DIAGNOSIS — Z98.890 STATUS POST SURGERY: Primary | ICD-10-CM

## 2020-01-09 PROCEDURE — 97140 MANUAL THERAPY 1/> REGIONS: CPT | Performed by: PHYSICAL THERAPIST

## 2020-01-09 PROCEDURE — 97110 THERAPEUTIC EXERCISES: CPT | Performed by: PHYSICAL THERAPIST

## 2020-01-14 ENCOUNTER — OFFICE VISIT (OUTPATIENT)
Dept: PHYSICAL THERAPY | Facility: REHABILITATION | Age: 30
End: 2020-01-14
Payer: COMMERCIAL

## 2020-01-14 DIAGNOSIS — Z98.890 STATUS POST SURGERY: Primary | ICD-10-CM

## 2020-01-14 PROCEDURE — 97112 NEUROMUSCULAR REEDUCATION: CPT

## 2020-01-14 PROCEDURE — 97110 THERAPEUTIC EXERCISES: CPT

## 2020-01-14 NOTE — PROGRESS NOTES
Daily Note     Today's date: 2020  Patient name: John Alonso  : 1990  MRN: [id number]  Referring provider: Sina Chaudhry PA-C  Dx:   Encounter Diagnosis     ICD-10-CM    1  Status post surgery Z98 890                   Subjective: Pt reports some increased stiffness in knee this session  Objective: See treatment diary below      Assessment: Tolerated treatment well  Patient would benefit from continued PT  Pt declined manuals this session in favor of prolonged stretches with flexion and extension  Pt  able to complete all exercises with no increase in pain during or after session  Pt 1:1 with PTA [phone], IEP for remainder  Plan: Continue per plan of care        Manual    25 mins 12/3 12/5 12/9 12/18 12/19 12/26 12/30 1/9 1/14   Patellar mob CR  ROM  5 mins SD 5' KP 4' rom KP 4' rom CR  4 mins MACHO 4' KP 2' MACHO 3' MACHO 5'  np   Seated knee flex LEFT             Supine knee ext st CR  10 mins SD 10' KP 5' KP 5' CR  8 mins MACHO 8' KP 5' Macho 5' MACHO 5' np   Prone knee flex CR  10 mins SD 10' KP 5' np NP MACHO 5' KP 5' MACHO 5' MACHO 5'  np   L Calf / SERRANO stretch  SD 5' KP 3' KP 2' CR  8 mins   MACHO  KP 2' MACHO 2' NP np         Exercise Diary  11/25 12/3 12/5 12/9 12/18 12/19 12/26 12/30 1/9 1/14   Nu Step NuStep  10 mins Nu step 10' 10' L10 10' L10 L10  10 mins L10 x10 10' L10 10' L10 10' L10 10' bike   [employer]             SAQ 3"x20 3"x20 20x3" 30x5" 5"x30 5" x30 30x 5" 30s x5 30x5" np   SLR - flex             Prone knee flexion 30 30 30 30 30 30 30 30 20 20   SLS             Extended knee flex             sidestepping             Calf/hamstring stretch             Prone knee flex stretch             Prone knee ext 10' 5# 10'x5 10' 5# 10' supine w/strap on table 10 mins  Supine  W/strap  on table With [address] 5# Prone hang x12' With [address] 5# prone hang 10' With MH 10# 10' With  10# 10' 10' 10#     Leg press prolonged stretch NV         100# 1' ea x 5    Prone hang              Treadmill 5 mins  Supine extension stretch with strap on table       10' 10'  10'                       Modalities

## 2020-01-16 ENCOUNTER — OFFICE VISIT (OUTPATIENT)
Dept: PHYSICAL THERAPY | Facility: REHABILITATION | Age: 30
End: 2020-01-16
Payer: COMMERCIAL

## 2020-01-16 DIAGNOSIS — Z98.890 STATUS POST SURGERY: Primary | ICD-10-CM

## 2020-01-16 PROCEDURE — 97140 MANUAL THERAPY 1/> REGIONS: CPT

## 2020-01-16 PROCEDURE — 97110 THERAPEUTIC EXERCISES: CPT

## 2020-01-16 PROCEDURE — 97112 NEUROMUSCULAR REEDUCATION: CPT

## 2020-01-16 NOTE — PROGRESS NOTES
Daily Note     Today's date: 2020  Patient name: Daryl Jones  : 1990  MRN: [id number]  Referring provider: Carlos Cavanaugh PA-C  Dx:   Encounter Diagnosis     ICD-10-CM    1  Status post surgery Z98 890                   Subjective: Pt  reports no change in status upon arrival   Pt has f/u with dr next week  Objective: See treatment diary below      Assessment: Tolerated treatment well  Patient would benefit from continued PT  Focused on flexion this session  Pt remains limited with flexion, states he feels more pain when returning to resting position after extended flexion  Pt  1:1 with PTA for entirety  Plan: Continue per plan of care        Manual         Patellar mob MACHO 4' KP 2' MACHO 3' MACHO 5'  np        Seated knee flex LEFT      KP 5'       Supine knee ext st MACHO 8' KP 5' Macho 5' MACHO 5' np        Prone knee flex MACHO 5' KP 5' MACHO 5' MACHO 5'  np KP 5'       L Calf / HA stretch MACHO  KP 2' MACHO 2' NP np              Exercise Diary         Nu Step L10 x10 10' L10 10' L10 10' L10 10' bike 10' L10       Quad sets             SAQ 5" x30 30x 5" 30s x5 30x5" np        SLR - flex             Prone knee flexion 30 30 30 20 20 20       SLS             Extended knee flex             sidestepping             Calf/hamstring stretch             Prone knee flex stretch             Prone knee ext With  5# Prone hang x12' With  5# prone hang 10' With  10# 10' With  10# 10' 10' 10# mh np        Leg press prolonged stretch     100# 1' ea x 5 100# 1' ea x 5        Prone hang              Treadmill              Supine extension stretch with strap on table  10' 10'  10'                            Modalities

## 2020-01-21 ENCOUNTER — OFFICE VISIT (OUTPATIENT)
Dept: OBGYN CLINIC | Facility: HOSPITAL | Age: 30
End: 2020-01-21
Payer: COMMERCIAL

## 2020-01-21 VITALS
DIASTOLIC BLOOD PRESSURE: 75 MMHG | HEART RATE: 64 BPM | BODY MASS INDEX: 26.39 KG/M2 | WEIGHT: 158.6 LBS | SYSTOLIC BLOOD PRESSURE: 120 MMHG

## 2020-01-21 DIAGNOSIS — Z87.81 S/P ORIF (OPEN REDUCTION INTERNAL FIXATION) FRACTURE: ICD-10-CM

## 2020-01-21 DIAGNOSIS — Z98.890 S/P ORIF (OPEN REDUCTION INTERNAL FIXATION) FRACTURE: ICD-10-CM

## 2020-01-21 DIAGNOSIS — M79.605 PAIN OF LEFT LOWER EXTREMITY: ICD-10-CM

## 2020-01-21 DIAGNOSIS — M24.562 FLEXION CONTRACTURE OF KNEE, LEFT: Primary | ICD-10-CM

## 2020-01-21 PROCEDURE — 99213 OFFICE O/P EST LOW 20 MIN: CPT | Performed by: ORTHOPAEDIC SURGERY

## 2020-01-21 RX ORDER — MONTELUKAST SODIUM 10 MG/1
10 TABLET ORAL DAILY
COMMUNITY
Start: 2019-12-31 | End: 2022-07-25
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
## 2020-02-05 ENCOUNTER — APPOINTMENT (OUTPATIENT)
Dept: PHYSICAL THERAPY | Facility: REHABILITATION | Age: 30
End: 2020-02-05
Payer: COMMERCIAL

## 2020-02-06 ENCOUNTER — OFFICE VISIT (OUTPATIENT)
Dept: PHYSICAL THERAPY | Facility: REHABILITATION | Age: 30
End: 2020-02-06
Payer: COMMERCIAL

## 2020-02-06 DIAGNOSIS — Z98.890 STATUS POST SURGERY: Primary | ICD-10-CM

## 2020-02-06 PROCEDURE — 97110 THERAPEUTIC EXERCISES: CPT | Performed by: PHYSICAL THERAPIST

## 2020-02-06 PROCEDURE — 97140 MANUAL THERAPY 1/> REGIONS: CPT | Performed by: PHYSICAL THERAPIST

## 2020-02-06 NOTE — PROGRESS NOTES
Daily Note     Today's date: 2020  Patient name: Aguilar Montalvo  : 1990  MRN: [id number]  Referring provider: Andres Khalil PA-C  Dx:   Encounter Diagnosis     ICD-10-CM    1  Status post surgery Z98 890                   Subjective: patient returns after illness  Saw physician last week who will see in follow up in 6 weeks and likely schedule open manipulation, patellar hardware removal and quadriceplasty      Objective: See treatment diary below      Assessment: Will need to determine next week if continuing pt is beneficial    Plan: Continue per plan of care        Manual   2/6      Patellar ROM MACHO 4' KP 2' MACHO 3' MACHO 5'  np  Macho 3'      Seated knee flex LEFT      KP 5'       Supine knee ext st MACHO 8' KP 5' Macho 5' MACHO 5' np  macho 7'      Prone knee flex MACHO 5' KP 5' MACHO 5' MACHO 5'  np KP 5' MACHO 5'      L Calf / HA stretch MACHO  KP 2' MACHO 2' NP np              Exercise Diary   2/6      Nu Step L10 x10 10' L10 10' L10 10' L10 10' bike 10' L10 10'      Quad sets             SAQ 5" x30 30x 5" 30s x5 30x5" np        SLR - flex             Prone knee flexion 30 30 30 20 20 20 20      SLS             Extended knee flex             sidestepping             Calf/hamstring stretch             Prone knee flex stretch             Prone knee ext With  5# Prone hang x12' With  5# prone hang 10' With  10# 10' With  10# 10' 10' 10# mh np 10# 8'       Leg press prolonged stretch     100# 1' ea x 5 100# 1' ea x 5 100# 1'x5 and 2x10       Prone hang              Treadmill              Supine extension stretch with strap on table  10' 10'  10'                            Modalities

## 2020-02-11 ENCOUNTER — OFFICE VISIT (OUTPATIENT)
Dept: PHYSICAL THERAPY | Facility: REHABILITATION | Age: 30
End: 2020-02-11
Payer: COMMERCIAL

## 2020-02-11 DIAGNOSIS — Z98.890 STATUS POST SURGERY: Primary | ICD-10-CM

## 2020-02-11 PROCEDURE — 97110 THERAPEUTIC EXERCISES: CPT

## 2020-02-11 PROCEDURE — 97112 NEUROMUSCULAR REEDUCATION: CPT

## 2020-02-11 NOTE — PROGRESS NOTES
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  Objective: See treatment diary below      Assessment: Tolerated treatment well  Patient would benefit from continued PT  Session focused on extension this session due to reported stiffness by patient  Pt  able to complete all exercises with no increase in pain during or after session  Pt 1:1 with PTA [id number], IEP for remainder  Plan: Continue per plan of care        Manual       Patellar ROM MACHO 4' KP 2' MACHO 3' MACHO 5'  np  Macho 3' np     Seated knee flex LEFT      KP 5'       Supine knee ext st MACHO 8' KP 5' Macho 5' MACHO 5' np  macho 7' np     Prone knee flex MACHO 5' KP 5' MACHO 5' MACHO 5'  np KP 5' MACHO 5' np     L Calf / HA stretch MACHO  KP 2' MACHO 2' NP np              Exercise Diary       Nu Step L10 x10 10' L10 10' L10 10' L10 10' bike 10' L10 10' 10'     Quad sets             SAQ 5" x30 30x 5" 30s x5 30x5" np        SLR - flex             Prone knee flexion 30 30 30 20 20 20 20 20x     SLS             Extended knee flex             sidestepping             Calf/hamstring stretch             Prone knee flex stretch             Prone knee ext With MH 5# Prone hang x12' With MH 5# prone hang 10' With MH 10# 10' With MH 10# 10' 10' 10# mh np 10# 8' np      Leg press prolonged stretch     100# 1' ea x 5 100# 1' ea x 5 100# 1'x5 and 2x10 100# 1'x5 and 2x10      Prone hang              Treadmill              Supine extension stretch with strap on table  10' 10'  10'   10'                         Modalities

## 2020-02-18 ENCOUNTER — APPOINTMENT (OUTPATIENT)
Dept: PHYSICAL THERAPY | Facility: REHABILITATION | Age: 30
End: 2020-02-18
Payer: COMMERCIAL

## 2020-02-20 ENCOUNTER — OFFICE VISIT (OUTPATIENT)
Dept: PHYSICAL THERAPY | Facility: REHABILITATION | Age: 30
End: 2020-02-20
Payer: COMMERCIAL

## 2020-02-20 DIAGNOSIS — Z98.890 STATUS POST SURGERY: Primary | ICD-10-CM

## 2020-02-20 PROCEDURE — 97140 MANUAL THERAPY 1/> REGIONS: CPT | Performed by: PHYSICAL THERAPIST

## 2020-02-20 PROCEDURE — 97110 THERAPEUTIC EXERCISES: CPT | Performed by: PHYSICAL THERAPIST

## 2020-02-20 NOTE — PROGRESS NOTES
Daily Note     Today's date: 2020  Patient name: Amadou Quiroz  : 1990  MRN: [id number]  Referring provider: Nunu Espinoza PA-C  Dx:   Encounter Diagnosis     ICD-10-CM    1  Status post surgery Z98 890                   Subjective: No new complaints      Objective: See treatment diary below      Assessment: Improved knee extension post treatment    Plan: Continue per plan of care        Manual      Patellar ROM MACHO 4' KP 2' MACHO 3' MACHO 5'  np  Macho 3' np Macho 5'    Seated knee flex LEFT      KP 5'       Supine knee ext st MACHO 8' KP 5' Macho 5' MACHO 5' np  macho 7' np MACHO 5'    Prone knee flex MACHO 5' KP 5' MACHO 5' MACHO 5'  np KP 5' MACHO 5' np MACHO 5'    L Calf / HA stretch MACHO  KP 2' MACHO 2' NP np              Exercise Diary      Nu Step L10 x10 10' L10 10' L10 10' L10 10' bike 10' L10 10' 10' 10'    Quad sets             SAQ 5" x30 30x 5" 30s x5 30x5" np        SLR - flex             Prone knee flexion 30 30 30 20 20 20 20 20x 30x    SLS             Extended knee flex             sidestepping             Calf/hamstring stretch             Prone knee flex stretch             Prone knee ext With  5# Prone hang x12' With MH 5# prone hang 10' With  10# 10' With  10# 10' 10' 10# mh np 10# 8' np      Leg press prolonged stretch     100# 1' ea x 5 100# 1' ea x 5 100# 1'x5 and 2x10 100# 1'x5 and 2x10 100# x10'     Prone hang              Treadmill              Supine extension stretch with strap on table  10' 10'  10'   10' 10 (prone)                        Modalities

## 2020-02-25 ENCOUNTER — OFFICE VISIT (OUTPATIENT)
Dept: PHYSICAL THERAPY | Facility: REHABILITATION | Age: 30
End: 2020-02-25
Payer: COMMERCIAL

## 2020-02-25 DIAGNOSIS — Z98.890 STATUS POST SURGERY: Primary | ICD-10-CM

## 2020-02-25 PROCEDURE — 97110 THERAPEUTIC EXERCISES: CPT

## 2020-02-25 PROCEDURE — 97112 NEUROMUSCULAR REEDUCATION: CPT

## 2020-02-25 PROCEDURE — 97140 MANUAL THERAPY 1/> REGIONS: CPT

## 2020-02-25 NOTE — PROGRESS NOTES
Daily Note     Today's date: 2020  Patient name: Ryan Faye  : 1990  MRN: [id number]  Referring provider: Chencho Reilly PA-C  Dx:   Encounter Diagnosis     ICD-10-CM    1  Status post surgery Z98 890                   Subjective: Pt states he feels like bending has a slight improvement, but no notable changes overall  Objective: See treatment diary below      Assessment: Tolerated treatment well  Patient would benefit from continued PT   Pt  able to complete all exercises with no increase in pain during or after session  Pt  1:1 with PTA for entirety  Pt NV planned to be last visit until he can have hardware removed, at which point he will resume PT  Plan: Potential discharge next visit       Manual     Patellar ROM MACHO 5'  np  Macho 3' np Macho 5'    Seated knee flex LEFT   KP 5'       Supine knee ext st MACHO 5' np  macho 7' np MACHO 5' KP 5'   Prone knee flex MACHO 5'  np KP 5' MACHO 5' np MACHO 5' KP 5'   L Calf / SERRANO stretch NP np              Exercise Diary     Nu Step L10 x10 10' L10 10' L10 10' L10 10' bike 10' L10 10' 10' 10' 10' L10   Quad sets             SAQ 5" x30 30x 5" 30s x5 30x5" np        SLR - flex             Prone knee flexion 30 30 30 20 20 20 20 20x 30x 30x   SLS             Extended knee flex             sidestepping             Calf/hamstring stretch             Prone knee flex stretch             Prone knee ext With  5# Prone hang x12' With MH 5# prone hang 10' With MH 10# 10' With  10# 10' 10' 10# mh np 10# 8' np      Leg press prolonged stretch     100# 1' ea x 5 100# 1' ea x 5 100# 1'x5 and 2x10 100# 1'x5 and 2x10 100# x10' 100# x 10'    Prone hang              Treadmill              Supine extension stretch with strap on table  10' 10'  10'   10' 10 (prone) 10'                       Modalities

## 2020-02-27 ENCOUNTER — OFFICE VISIT (OUTPATIENT)
Dept: PHYSICAL THERAPY | Facility: REHABILITATION | Age: 30
End: 2020-02-27
Payer: COMMERCIAL

## 2020-02-27 DIAGNOSIS — Z98.890 STATUS POST SURGERY: Primary | ICD-10-CM

## 2020-02-27 PROCEDURE — 97110 THERAPEUTIC EXERCISES: CPT | Performed by: PHYSICAL THERAPIST

## 2020-02-27 PROCEDURE — 97140 MANUAL THERAPY 1/> REGIONS: CPT | Performed by: PHYSICAL THERAPIST

## 2020-02-27 NOTE — PROGRESS NOTES
PT Discharge    Today's date: 2020  Patient name: Car Waldrop  : 1990  MRN: [id number]  Referring provider: Niecy Zamora PA-C  Dx:   Encounter Diagnosis     ICD-10-CM    1  Status post surgery Z98 890                   Assessment  Assessment details: Max benefit from PT at this time  Patient will return to PT after next surgical intervention     Impairments: abnormal or restricted ROM, activity intolerance, impaired physical strength, pain with function and weight-bearing intolerance    Symptom irritability: low  Plan  Planned therapy interventions: home exercise program  Treatment plan discussed with: patient        Subjective Evaluation    Pain  Current pain ratin  At best pain ratin  At worst pain ratin    Treatments  Current treatment: physical therapy  Patient Goals  Patient goals for therapy: increased motion, decreased pain, increased strength, independence with ADLs/IADLs, return to sport/leisure activities and return to work          Objective     Active Range of Motion   Left Knee   Flexion: 72 degrees   Extension: -22 degrees     Right Knee   Normal active range of motion    Passive Range of Motion   Left Knee   Flexion: 85 degrees   Extension: -18 degrees       Flowsheet Rows      Most Recent Value   PT/OT G-Codes   Current Score  60   Projected Score  65             Precautions: None      Manual              Manual knee flex/ext st MACHO 15'            Prone hang 10'                                                       Exercise Diary              Bike  10'            Leg press 100 10'            Prone knee flex 2x15                                                                                                                                                                                                                                             Modalities

## 2020-06-02 ENCOUNTER — HOSPITAL ENCOUNTER (OUTPATIENT)
Dept: RADIOLOGY | Facility: HOSPITAL | Age: 30
Discharge: HOME | End: 2020-06-02
Attending: ORTHOPAEDIC SURGERY
Payer: COMMERCIAL

## 2020-06-02 ENCOUNTER — OFFICE VISIT (OUTPATIENT)
Dept: OBGYN CLINIC | Facility: HOSPITAL | Age: 30
End: 2020-06-02
Payer: COMMERCIAL

## 2020-06-02 ENCOUNTER — APPOINTMENT (OUTPATIENT)
Dept: LAB | Facility: HOSPITAL | Age: 30
End: 2020-06-02
Attending: PHYSICIAN ASSISTANT
Payer: COMMERCIAL

## 2020-06-02 VITALS
HEART RATE: 62 BPM | HEIGHT: 65 IN | WEIGHT: 160 LBS | DIASTOLIC BLOOD PRESSURE: 82 MMHG | BODY MASS INDEX: 26.66 KG/M2 | SYSTOLIC BLOOD PRESSURE: 135 MMHG

## 2020-06-02 DIAGNOSIS — M79.605 PAIN OF LEFT LOWER EXTREMITY: ICD-10-CM

## 2020-06-02 DIAGNOSIS — M24.562 KNEE CONTRACTURE, LEFT: ICD-10-CM

## 2020-06-02 DIAGNOSIS — T84.84XA PAINFUL ORTHOPAEDIC HARDWARE (HCC): ICD-10-CM

## 2020-06-02 DIAGNOSIS — T84.84XA PAINFUL ORTHOPAEDIC HARDWARE (HCC): Primary | ICD-10-CM

## 2020-06-02 LAB
ALBUMIN SERPL BCP-MCNC: 4.3 G/DL (ref 3.5–5)
ALP SERPL-CCNC: 128 U/L (ref 46–116)
ALT SERPL W P-5'-P-CCNC: 76 U/L (ref 12–78)
ANION GAP SERPL CALCULATED.3IONS-SCNC: 3 MMOL/L (ref 4–13)
APTT PPP: 30 SECONDS (ref 23–37)
AST SERPL W P-5'-P-CCNC: 39 U/L (ref 5–45)
BASOPHILS # BLD AUTO: 0.04 THOUSANDS/ΜL (ref 0–0.1)
BASOPHILS NFR BLD AUTO: 1 % (ref 0–1)
BILIRUB SERPL-MCNC: 2.09 MG/DL (ref 0.2–1)
BUN SERPL-MCNC: 12 MG/DL (ref 5–25)
CALCIUM SERPL-MCNC: 9.2 MG/DL (ref 8.3–10.1)
CHLORIDE SERPL-SCNC: 107 MMOL/L (ref 100–108)
CO2 SERPL-SCNC: 29 MMOL/L (ref 21–32)
CREAT SERPL-MCNC: 0.78 MG/DL (ref 0.6–1.3)
EOSINOPHIL # BLD AUTO: 0.16 THOUSAND/ΜL (ref 0–0.61)
EOSINOPHIL NFR BLD AUTO: 2 % (ref 0–6)
ERYTHROCYTE [DISTWIDTH] IN BLOOD BY AUTOMATED COUNT: 12.7 % (ref 11.6–15.1)
GFR SERPL CREATININE-BSD FRML MDRD: 124 ML/MIN/1.73SQ M
GLUCOSE SERPL-MCNC: 94 MG/DL (ref 65–140)
HCT VFR BLD AUTO: 47.7 % (ref 36.5–49.3)
HGB BLD-MCNC: 16.2 G/DL (ref 12–17)
IMM GRANULOCYTES # BLD AUTO: 0.03 THOUSAND/UL (ref 0–0.2)
IMM GRANULOCYTES NFR BLD AUTO: 0 % (ref 0–2)
INR PPP: 1.05 (ref 0.84–1.19)
LYMPHOCYTES # BLD AUTO: 2.76 THOUSANDS/ΜL (ref 0.6–4.47)
LYMPHOCYTES NFR BLD AUTO: 39 % (ref 14–44)
MCH RBC QN AUTO: 29 PG (ref 26.8–34.3)
MCHC RBC AUTO-ENTMCNC: 34 G/DL (ref 31.4–37.4)
MCV RBC AUTO: 85 FL (ref 82–98)
MONOCYTES # BLD AUTO: 0.52 THOUSAND/ΜL (ref 0.17–1.22)
MONOCYTES NFR BLD AUTO: 7 % (ref 4–12)
NEUTROPHILS # BLD AUTO: 3.49 THOUSANDS/ΜL (ref 1.85–7.62)
NEUTS SEG NFR BLD AUTO: 51 % (ref 43–75)
NRBC BLD AUTO-RTO: 0 /100 WBCS
PLATELET # BLD AUTO: 268 THOUSANDS/UL (ref 149–390)
PMV BLD AUTO: 9.3 FL (ref 8.9–12.7)
POTASSIUM SERPL-SCNC: 4.3 MMOL/L (ref 3.5–5.3)
PROT SERPL-MCNC: 7.5 G/DL (ref 6.4–8.2)
PROTHROMBIN TIME: 13.3 SECONDS (ref 11.6–14.5)
RBC # BLD AUTO: 5.59 MILLION/UL (ref 3.88–5.62)
SODIUM SERPL-SCNC: 139 MMOL/L (ref 136–145)
WBC # BLD AUTO: 7 THOUSAND/UL (ref 4.31–10.16)

## 2020-06-02 PROCEDURE — 85025 COMPLETE CBC W/AUTO DIFF WBC: CPT

## 2020-06-02 PROCEDURE — 80053 COMPREHEN METABOLIC PANEL: CPT

## 2020-06-02 PROCEDURE — 85730 THROMBOPLASTIN TIME PARTIAL: CPT

## 2020-06-02 PROCEDURE — 36415 COLL VENOUS BLD VENIPUNCTURE: CPT

## 2020-06-02 PROCEDURE — 73552 X-RAY EXAM OF FEMUR 2/>: CPT

## 2020-06-02 PROCEDURE — 73560 X-RAY EXAM OF KNEE 1 OR 2: CPT

## 2020-06-02 PROCEDURE — 85610 PROTHROMBIN TIME: CPT

## 2020-06-02 PROCEDURE — 99214 OFFICE O/P EST MOD 30 MIN: CPT | Performed by: ORTHOPAEDIC SURGERY

## 2020-06-02 RX ORDER — CEFAZOLIN SODIUM 2 G/50ML
2000 SOLUTION INTRAVENOUS ONCE
Status: CANCELLED | OUTPATIENT
Start: 2020-06-02 | End: 2020-06-02

## 2020-06-04 ENCOUNTER — ANESTHESIA EVENT (OUTPATIENT)
Dept: PERIOP | Facility: HOSPITAL | Age: 30
End: 2020-06-04
Payer: COMMERCIAL

## 2020-06-06 DIAGNOSIS — Z11.59 SCREENING FOR VIRAL DISEASE: ICD-10-CM

## 2020-06-06 PROCEDURE — U0003 INFECTIOUS AGENT DETECTION BY NUCLEIC ACID (DNA OR RNA); SEVERE ACUTE RESPIRATORY SYNDROME CORONAVIRUS 2 (SARS-COV-2) (CORONAVIRUS DISEASE [COVID-19]), AMPLIFIED PROBE TECHNIQUE, MAKING USE OF HIGH THROUGHPUT TECHNOLOGIES AS DESCRIBED BY CMS-2020-01-R: HCPCS

## 2020-06-08 ENCOUNTER — TELEPHONE (OUTPATIENT)
Dept: OBGYN CLINIC | Facility: HOSPITAL | Age: 30
End: 2020-06-08

## 2020-06-08 LAB — SARS-COV-2 RNA SPEC QL NAA+PROBE: NOT DETECTED

## 2020-06-12 ENCOUNTER — TELEPHONE (OUTPATIENT)
Dept: OBGYN CLINIC | Facility: HOSPITAL | Age: 30
End: 2020-06-12

## 2020-06-12 ENCOUNTER — ANESTHESIA (OUTPATIENT)
Dept: PERIOP | Facility: HOSPITAL | Age: 30
End: 2020-06-12
Payer: COMMERCIAL

## 2020-06-12 ENCOUNTER — HOSPITAL ENCOUNTER (OUTPATIENT)
Facility: HOSPITAL | Age: 30
Setting detail: OUTPATIENT SURGERY
Discharge: HOME | End: 2020-06-12
Attending: ORTHOPAEDIC SURGERY | Admitting: ORTHOPAEDIC SURGERY
Payer: COMMERCIAL

## 2020-06-12 ENCOUNTER — HOSPITAL ENCOUNTER (OUTPATIENT)
Dept: RADIOLOGY | Facility: HOSPITAL | Age: 30
Setting detail: OUTPATIENT SURGERY
Discharge: HOME | End: 2020-06-12
Payer: COMMERCIAL

## 2020-06-12 VITALS
SYSTOLIC BLOOD PRESSURE: 102 MMHG | HEART RATE: 69 BPM | DIASTOLIC BLOOD PRESSURE: 57 MMHG | WEIGHT: 160 LBS | HEIGHT: 64 IN | RESPIRATION RATE: 18 BRPM | OXYGEN SATURATION: 99 % | TEMPERATURE: 97.4 F | BODY MASS INDEX: 27.31 KG/M2

## 2020-06-12 DIAGNOSIS — Z98.890 STATUS POST SURGERY: Primary | ICD-10-CM

## 2020-06-12 DIAGNOSIS — T84.84XA PAINFUL ORTHOPAEDIC HARDWARE (HCC): ICD-10-CM

## 2020-06-12 PROCEDURE — 20680 REMOVAL OF IMPLANT DEEP: CPT | Performed by: ORTHOPAEDIC SURGERY

## 2020-06-12 PROCEDURE — 27570 FIXATION OF KNEE JOINT: CPT | Performed by: ORTHOPAEDIC SURGERY

## 2020-06-12 PROCEDURE — 27430 REVISION OF THIGH MUSCLES: CPT | Performed by: ORTHOPAEDIC SURGERY

## 2020-06-12 PROCEDURE — 99024 POSTOP FOLLOW-UP VISIT: CPT | Performed by: ORTHOPAEDIC SURGERY

## 2020-06-12 PROCEDURE — 73560 X-RAY EXAM OF KNEE 1 OR 2: CPT

## 2020-06-12 RX ORDER — HYDROCODONE BITARTRATE AND ACETAMINOPHEN 5; 325 MG/1; MG/1
1 TABLET ORAL EVERY 6 HOURS PRN
Qty: 30 TABLET | Refills: 0 | Status: SHIPPED | OUTPATIENT
Start: 2020-06-12 | End: 2020-06-22

## 2020-06-12 RX ORDER — ACETAMINOPHEN 325 MG/1
650 TABLET ORAL EVERY 6 HOURS PRN
Status: DISCONTINUED | OUTPATIENT
Start: 2020-06-12 | End: 2020-06-12 | Stop reason: HOSPADM

## 2020-06-12 RX ORDER — FENTANYL CITRATE/PF 50 MCG/ML
25 SYRINGE (ML) INJECTION
Status: DISCONTINUED | OUTPATIENT
Start: 2020-06-12 | End: 2020-06-12 | Stop reason: HOSPADM

## 2020-06-12 RX ORDER — ROPIVACAINE HYDROCHLORIDE 5 MG/ML
INJECTION, SOLUTION EPIDURAL; INFILTRATION; PERINEURAL
Status: COMPLETED | OUTPATIENT
Start: 2020-06-12 | End: 2020-06-12

## 2020-06-12 RX ORDER — ONDANSETRON 2 MG/ML
4 INJECTION INTRAMUSCULAR; INTRAVENOUS EVERY 4 HOURS PRN
Status: DISCONTINUED | OUTPATIENT
Start: 2020-06-12 | End: 2020-06-12 | Stop reason: HOSPADM

## 2020-06-12 RX ORDER — GLYCOPYRROLATE 0.2 MG/ML
INJECTION INTRAMUSCULAR; INTRAVENOUS AS NEEDED
Status: DISCONTINUED | OUTPATIENT
Start: 2020-06-12 | End: 2020-06-12 | Stop reason: SURG

## 2020-06-12 RX ORDER — SODIUM CHLORIDE, SODIUM LACTATE, POTASSIUM CHLORIDE, CALCIUM CHLORIDE 600; 310; 30; 20 MG/100ML; MG/100ML; MG/100ML; MG/100ML
125 INJECTION, SOLUTION INTRAVENOUS CONTINUOUS
Status: DISCONTINUED | OUTPATIENT
Start: 2020-06-12 | End: 2020-06-12 | Stop reason: HOSPADM

## 2020-06-12 RX ORDER — FENTANYL CITRATE 50 UG/ML
INJECTION, SOLUTION INTRAMUSCULAR; INTRAVENOUS AS NEEDED
Status: DISCONTINUED | OUTPATIENT
Start: 2020-06-12 | End: 2020-06-12 | Stop reason: SURG

## 2020-06-12 RX ORDER — MIDAZOLAM HYDROCHLORIDE 2 MG/2ML
INJECTION, SOLUTION INTRAMUSCULAR; INTRAVENOUS AS NEEDED
Status: DISCONTINUED | OUTPATIENT
Start: 2020-06-12 | End: 2020-06-12 | Stop reason: SURG

## 2020-06-12 RX ORDER — ROCURONIUM BROMIDE 10 MG/ML
INJECTION, SOLUTION INTRAVENOUS AS NEEDED
Status: DISCONTINUED | OUTPATIENT
Start: 2020-06-12 | End: 2020-06-12 | Stop reason: SURG

## 2020-06-12 RX ORDER — MAGNESIUM HYDROXIDE 1200 MG/15ML
LIQUID ORAL AS NEEDED
Status: DISCONTINUED | OUTPATIENT
Start: 2020-06-12 | End: 2020-06-12 | Stop reason: HOSPADM

## 2020-06-12 RX ORDER — ONDANSETRON 2 MG/ML
INJECTION INTRAMUSCULAR; INTRAVENOUS AS NEEDED
Status: DISCONTINUED | OUTPATIENT
Start: 2020-06-12 | End: 2020-06-12 | Stop reason: SURG

## 2020-06-12 RX ORDER — ONDANSETRON 2 MG/ML
4 INJECTION INTRAMUSCULAR; INTRAVENOUS EVERY 6 HOURS PRN
Status: DISCONTINUED | OUTPATIENT
Start: 2020-06-12 | End: 2020-06-12 | Stop reason: HOSPADM

## 2020-06-12 RX ORDER — SODIUM CHLORIDE, SODIUM LACTATE, POTASSIUM CHLORIDE, CALCIUM CHLORIDE 600; 310; 30; 20 MG/100ML; MG/100ML; MG/100ML; MG/100ML
100 INJECTION, SOLUTION INTRAVENOUS CONTINUOUS
Status: DISCONTINUED | OUTPATIENT
Start: 2020-06-12 | End: 2020-06-12 | Stop reason: HOSPADM

## 2020-06-12 RX ORDER — NEOSTIGMINE METHYLSULFATE 1 MG/ML
INJECTION INTRAVENOUS AS NEEDED
Status: DISCONTINUED | OUTPATIENT
Start: 2020-06-12 | End: 2020-06-12 | Stop reason: SURG

## 2020-06-12 RX ORDER — LIDOCAINE HYDROCHLORIDE 10 MG/ML
0.5 INJECTION, SOLUTION EPIDURAL; INFILTRATION; INTRACAUDAL; PERINEURAL ONCE AS NEEDED
Status: COMPLETED | OUTPATIENT
Start: 2020-06-12 | End: 2020-06-12

## 2020-06-12 RX ORDER — CEFAZOLIN SODIUM 2 G/50ML
2000 SOLUTION INTRAVENOUS ONCE
Status: COMPLETED | OUTPATIENT
Start: 2020-06-12 | End: 2020-06-12

## 2020-06-12 RX ORDER — DOCUSATE SODIUM 100 MG/1
100 CAPSULE, LIQUID FILLED ORAL 2 TIMES DAILY
Qty: 10 CAPSULE | Refills: 0 | Status: SHIPPED | OUTPATIENT
Start: 2020-06-12 | End: 2022-07-25

## 2020-06-12 RX ORDER — PROPOFOL 10 MG/ML
INJECTION, EMULSION INTRAVENOUS AS NEEDED
Status: DISCONTINUED | OUTPATIENT
Start: 2020-06-12 | End: 2020-06-12 | Stop reason: SURG

## 2020-06-12 RX ADMIN — FENTANYL CITRATE 25 MCG: 50 INJECTION, SOLUTION INTRAMUSCULAR; INTRAVENOUS at 10:50

## 2020-06-12 RX ADMIN — ROPIVACAINE HYDROCHLORIDE 30 ML: 5 INJECTION, SOLUTION EPIDURAL; INFILTRATION; PERINEURAL at 08:45

## 2020-06-12 RX ADMIN — MIDAZOLAM 2 MG: 1 INJECTION INTRAMUSCULAR; INTRAVENOUS at 08:29

## 2020-06-12 RX ADMIN — LIDOCAINE HYDROCHLORIDE 0.2 ML: 10 INJECTION, SOLUTION EPIDURAL; INFILTRATION; INTRACAUDAL; PERINEURAL at 08:13

## 2020-06-12 RX ADMIN — FENTANYL CITRATE 25 MCG: 50 INJECTION, SOLUTION INTRAMUSCULAR; INTRAVENOUS at 11:06

## 2020-06-12 RX ADMIN — GLYCOPYRROLATE 0.4 MG: 0.2 INJECTION, SOLUTION INTRAMUSCULAR; INTRAVENOUS at 10:09

## 2020-06-12 RX ADMIN — FENTANYL CITRATE 25 MCG: 50 INJECTION, SOLUTION INTRAMUSCULAR; INTRAVENOUS at 11:00

## 2020-06-12 RX ADMIN — ACETAMINOPHEN 650 MG: 325 TABLET ORAL at 12:32

## 2020-06-12 RX ADMIN — PHENYLEPHRINE HYDROCHLORIDE 200 MCG: 10 INJECTION INTRAVENOUS at 10:02

## 2020-06-12 RX ADMIN — SODIUM CHLORIDE, SODIUM LACTATE, POTASSIUM CHLORIDE, AND CALCIUM CHLORIDE 125 ML/HR: .6; .31; .03; .02 INJECTION, SOLUTION INTRAVENOUS at 08:13

## 2020-06-12 RX ADMIN — NEOSTIGMINE METHYLSULFATE 4 MG: 1 INJECTION, SOLUTION INTRAVENOUS at 10:08

## 2020-06-12 RX ADMIN — ONDANSETRON 4 MG: 2 INJECTION INTRAMUSCULAR; INTRAVENOUS at 10:07

## 2020-06-12 RX ADMIN — PROPOFOL 200 MG: 10 INJECTION, EMULSION INTRAVENOUS at 08:56

## 2020-06-12 RX ADMIN — ROCURONIUM BROMIDE 50 MG: 10 INJECTION, SOLUTION INTRAVENOUS at 08:56

## 2020-06-12 RX ADMIN — FENTANYL CITRATE 100 MCG: 50 INJECTION, SOLUTION INTRAMUSCULAR; INTRAVENOUS at 08:38

## 2020-06-12 RX ADMIN — CEFAZOLIN SODIUM 2000 MG: 2 SOLUTION INTRAVENOUS at 09:06

## 2020-06-12 RX ADMIN — FENTANYL CITRATE 25 MCG: 50 INJECTION, SOLUTION INTRAMUSCULAR; INTRAVENOUS at 10:55

## 2020-06-15 ENCOUNTER — EVALUATION (OUTPATIENT)
Dept: PHYSICAL THERAPY | Facility: REHABILITATION | Age: 30
End: 2020-06-15
Payer: COMMERCIAL

## 2020-06-15 DIAGNOSIS — Z98.890 S/P LEFT KNEE SURGERY: Primary | ICD-10-CM

## 2020-06-15 PROCEDURE — 97140 MANUAL THERAPY 1/> REGIONS: CPT | Performed by: PHYSICAL THERAPIST

## 2020-06-15 PROCEDURE — 97162 PT EVAL MOD COMPLEX 30 MIN: CPT | Performed by: PHYSICAL THERAPIST

## 2020-06-16 ENCOUNTER — TELEPHONE (OUTPATIENT)
Dept: OBGYN CLINIC | Facility: HOSPITAL | Age: 30
End: 2020-06-16

## 2020-06-17 ENCOUNTER — OFFICE VISIT (OUTPATIENT)
Dept: PHYSICAL THERAPY | Facility: REHABILITATION | Age: 30
End: 2020-06-17
Payer: COMMERCIAL

## 2020-06-17 DIAGNOSIS — Z98.890 S/P LEFT KNEE SURGERY: Primary | ICD-10-CM

## 2020-06-17 PROCEDURE — 97140 MANUAL THERAPY 1/> REGIONS: CPT | Performed by: PHYSICAL THERAPIST

## 2020-06-17 PROCEDURE — 97112 NEUROMUSCULAR REEDUCATION: CPT | Performed by: PHYSICAL THERAPIST

## 2020-06-17 PROCEDURE — 97110 THERAPEUTIC EXERCISES: CPT | Performed by: PHYSICAL THERAPIST

## 2020-06-18 ENCOUNTER — OFFICE VISIT (OUTPATIENT)
Dept: PHYSICAL THERAPY | Facility: REHABILITATION | Age: 30
End: 2020-06-18
Payer: COMMERCIAL

## 2020-06-18 DIAGNOSIS — Z98.890 S/P LEFT KNEE SURGERY: Primary | ICD-10-CM

## 2020-06-18 PROCEDURE — 97140 MANUAL THERAPY 1/> REGIONS: CPT | Performed by: PHYSICAL THERAPIST

## 2020-06-18 PROCEDURE — 97110 THERAPEUTIC EXERCISES: CPT | Performed by: PHYSICAL THERAPIST

## 2020-06-19 ENCOUNTER — OFFICE VISIT (OUTPATIENT)
Dept: PHYSICAL THERAPY | Facility: REHABILITATION | Age: 30
End: 2020-06-19
Payer: COMMERCIAL

## 2020-06-19 DIAGNOSIS — Z98.890 S/P LEFT KNEE SURGERY: Primary | ICD-10-CM

## 2020-06-19 PROCEDURE — 97110 THERAPEUTIC EXERCISES: CPT

## 2020-06-19 PROCEDURE — 97112 NEUROMUSCULAR REEDUCATION: CPT

## 2020-06-19 PROCEDURE — 97140 MANUAL THERAPY 1/> REGIONS: CPT

## 2020-06-22 ENCOUNTER — OFFICE VISIT (OUTPATIENT)
Dept: PHYSICAL THERAPY | Facility: REHABILITATION | Age: 30
End: 2020-06-22
Payer: COMMERCIAL

## 2020-06-22 DIAGNOSIS — Z98.890 S/P LEFT KNEE SURGERY: Primary | ICD-10-CM

## 2020-06-22 PROCEDURE — 97140 MANUAL THERAPY 1/> REGIONS: CPT | Performed by: PHYSICAL THERAPIST

## 2020-06-22 PROCEDURE — 97110 THERAPEUTIC EXERCISES: CPT | Performed by: PHYSICAL THERAPIST

## 2020-06-24 ENCOUNTER — OFFICE VISIT (OUTPATIENT)
Dept: PHYSICAL THERAPY | Facility: REHABILITATION | Age: 30
End: 2020-06-24
Payer: COMMERCIAL

## 2020-06-24 DIAGNOSIS — Z98.890 S/P LEFT KNEE SURGERY: Primary | ICD-10-CM

## 2020-06-24 PROCEDURE — 97140 MANUAL THERAPY 1/> REGIONS: CPT | Performed by: PHYSICAL THERAPIST

## 2020-06-24 PROCEDURE — 97110 THERAPEUTIC EXERCISES: CPT | Performed by: PHYSICAL THERAPIST

## 2020-06-26 ENCOUNTER — OFFICE VISIT (OUTPATIENT)
Dept: PHYSICAL THERAPY | Facility: REHABILITATION | Age: 30
End: 2020-06-26
Payer: COMMERCIAL

## 2020-06-26 DIAGNOSIS — Z98.890 S/P LEFT KNEE SURGERY: Primary | ICD-10-CM

## 2020-06-26 PROCEDURE — 97112 NEUROMUSCULAR REEDUCATION: CPT

## 2020-06-26 PROCEDURE — 97140 MANUAL THERAPY 1/> REGIONS: CPT

## 2020-06-29 ENCOUNTER — OFFICE VISIT (OUTPATIENT)
Dept: PHYSICAL THERAPY | Facility: REHABILITATION | Age: 30
End: 2020-06-29
Payer: COMMERCIAL

## 2020-06-29 DIAGNOSIS — Z98.890 S/P LEFT KNEE SURGERY: Primary | ICD-10-CM

## 2020-06-29 PROCEDURE — 97110 THERAPEUTIC EXERCISES: CPT | Performed by: PHYSICAL MEDICINE & REHABILITATION

## 2020-06-29 PROCEDURE — 97140 MANUAL THERAPY 1/> REGIONS: CPT | Performed by: PHYSICAL MEDICINE & REHABILITATION

## 2020-06-30 ENCOUNTER — OFFICE VISIT (OUTPATIENT)
Dept: OBGYN CLINIC | Facility: HOSPITAL | Age: 30
End: 2020-06-30

## 2020-06-30 VITALS — HEART RATE: 80 BPM | DIASTOLIC BLOOD PRESSURE: 92 MMHG | SYSTOLIC BLOOD PRESSURE: 151 MMHG

## 2020-06-30 DIAGNOSIS — T84.84XA PAINFUL ORTHOPAEDIC HARDWARE (HCC): Primary | ICD-10-CM

## 2020-06-30 PROCEDURE — 99024 POSTOP FOLLOW-UP VISIT: CPT | Performed by: ORTHOPAEDIC SURGERY

## 2020-06-30 RX ORDER — HYDROCODONE BITARTRATE AND ACETAMINOPHEN 5; 325 MG/1; MG/1
1 TABLET ORAL EVERY 6 HOURS PRN
Qty: 42 TABLET | Refills: 0 | Status: SHIPPED | OUTPATIENT
Start: 2020-06-30 | End: 2020-07-10

## 2020-07-01 ENCOUNTER — OFFICE VISIT (OUTPATIENT)
Dept: PHYSICAL THERAPY | Facility: REHABILITATION | Age: 30
End: 2020-07-01
Payer: COMMERCIAL

## 2020-07-01 DIAGNOSIS — Z98.890 S/P LEFT KNEE SURGERY: Primary | ICD-10-CM

## 2020-07-01 PROCEDURE — 97112 NEUROMUSCULAR REEDUCATION: CPT | Performed by: PHYSICAL THERAPIST

## 2020-07-01 PROCEDURE — 97140 MANUAL THERAPY 1/> REGIONS: CPT | Performed by: PHYSICAL THERAPIST

## 2020-07-01 NOTE — PROGRESS NOTES
Daily Note     Today's date: 2020  Patient name: Ana Darden  : 1990  MRN: [id number]  Referring provider: Squire Severs, PA-C  Dx:   Encounter Diagnosis     ICD-10-CM    1  S/P left knee surgery Z98 890                   Subjective: Patient reports that he got staples out and is feeling pretty good today  Objective: See treatment diary below      Assessment: Tolerated treatment well  Patient would benefit from continued PT      Plan: Continue per plan of care  Precautions: none      Manuals      Knee flex/ext stretch MACHO 25' SE 25' KP 25' SE 25' SE 25' KP 25' LH SE 25'      Patellar ROM MACHO 5' SE 5' KP 5' SE 5' SE 5' KP 5' LH SE 5'                               Neuro Re-Ed            QS - prone NP            TKE - bolster 5s x20 5sx20 20x5" 20x5" 20x5" 20x5"       LAQ - after stretching 2x15 2x15 2x15 2x15 2x15 2x15 2x15 2x15                                                         Ther Ex            Nu step Lv 3 10' L3 10'  8' L3 10' L10 10' L10 10' L10 10' L10 10' L10     Prone hang             Heel slides             Sustained flexion - seated with t-band 10' 10' w/ mob   Belt blue 10' btb 10' btb  10' mob belt 10' mob belt 10' mob belt 10' mob belt                                                                       Ther Activity                                       Gait Training                                       Modalities

## 2020-07-02 ENCOUNTER — OFFICE VISIT (OUTPATIENT)
Dept: PHYSICAL THERAPY | Facility: REHABILITATION | Age: 30
End: 2020-07-02
Payer: COMMERCIAL

## 2020-07-02 DIAGNOSIS — Z98.890 S/P LEFT KNEE SURGERY: Primary | ICD-10-CM

## 2020-07-02 PROCEDURE — 97112 NEUROMUSCULAR REEDUCATION: CPT

## 2020-07-02 PROCEDURE — 97140 MANUAL THERAPY 1/> REGIONS: CPT

## 2020-07-02 NOTE — PROGRESS NOTES
Daily Note     Today's date: 2020  Patient name: Jose Alfredo Darden  : 1990  MRN: [id number]  Referring provider: Kori Ochoa PA-C  Dx:   Encounter Diagnosis     ICD-10-CM    1  S/P left knee surgery Z98 890                   Subjective: Pt reports he continues to feel some improvement with mobility of knee      Objective: See treatment diary below      Assessment: Tolerated treatment well  Patient would benefit from continued PT   Pt  able to complete all exercises with no increase in pain during or after session  Pt 1:1 with PTA [id number], IEP for remainder  Plan: Continue per plan of care  Precautions: none      Manuals     Knee flex/ext stretch MACHO 25' SE 25' KP 25' SE 25' SE 25' KP 25' LH SE 25'  KP 25'    Patellar ROM MACHO 5' SE 5' KP 5' SE 5' SE 5' KP 5' LH SE 5' KP 5'                              Neuro Re-Ed            QS - prone NP            TKE - bolster 5s x20 5sx20 20x5" 20x5" 20x5" 20x5"       LAQ - after stretching 2x15 2x15 2x15 2x15 2x15 2x15 2x15 2x15 2x15                                                        Ther Ex            Nu step Lv 3 10' L3 10'  8' L3 10' L10 10' L10 10' L10 10' L10 10' L10 10' L10    Prone hang             Heel slides             Sustained flexion - seated with t-band 10' 10' w/ mob   Belt blue 10' btb 10' btb  10' mob belt 10' mob belt 10' mob belt 10' mob belt  10'                                                                     Ther Activity                                       Gait Training                                       Modalities

## 2020-07-06 ENCOUNTER — TELEPHONE (OUTPATIENT)
Dept: OBGYN CLINIC | Facility: HOSPITAL | Age: 30
End: 2020-07-06

## 2020-07-06 ENCOUNTER — OFFICE VISIT (OUTPATIENT)
Dept: PHYSICAL THERAPY | Facility: REHABILITATION | Age: 30
End: 2020-07-06
Payer: COMMERCIAL

## 2020-07-06 DIAGNOSIS — Z98.890 S/P LEFT KNEE SURGERY: Primary | ICD-10-CM

## 2020-07-06 PROCEDURE — 97140 MANUAL THERAPY 1/> REGIONS: CPT | Performed by: PHYSICAL THERAPIST

## 2020-07-06 PROCEDURE — 97110 THERAPEUTIC EXERCISES: CPT | Performed by: PHYSICAL THERAPIST

## 2020-07-06 NOTE — PROGRESS NOTES
Daily Note     Today's date: 2020  Patient name: Anthony Hart  : 1990  MRN: [id number]  Referring provider: Elizabeth Queen PA-C  Dx: No diagnosis found  Subjective: Did have a very small amount of drainage over the weekend, however, has resolved  Objective: See treatment diary below      Assessment: No evidence of infection after minimal drainage over the weekend  Plan: Continue per plan of care  Precautions: none      Manuals    Knee flex/ext stretch MACHO 25' SE 25' KP 25' SE 25' SE 25' KP 25' LH SE 25'  KP 25' MACHO 25'   Patellar ROM MACHO 5' SE 5' KP 5' SE 5' SE 5' KP 5' LH SE 5' KP 5' MACHO 5'                             Neuro Re-Ed            QS - prone NP            TKE - bolster 5s x20 5sx20 20x5" 20x5" 20x5" 20x5"       LAQ - after stretching 2x15 2x15 2x15 2x15 2x15 2x15 2x15 2x15 2x15 2x15                                                       Ther Ex            Nu step Lv 3 10' L3 10'  8' L3 10' L10 10' L10 10' L10 10' L10 10' L10 10' L10 10' L10   Prone hang          5# 5'   Heel slides             Sustained flexion - seated with t-band 10' 10' w/ mob   Belt blue 10' btb 10' btb  10' mob belt 10' mob belt 10' mob belt 10' mob belt  10' 10"                                                                    Ther Activity                                       Gait Training                                       Modalities

## 2020-07-06 NOTE — TELEPHONE ENCOUNTER
Can you place an updated work note from patients last office visit stating work restrictions/limitations     Please advise

## 2020-07-08 ENCOUNTER — OFFICE VISIT (OUTPATIENT)
Dept: PHYSICAL THERAPY | Facility: REHABILITATION | Age: 30
End: 2020-07-08
Payer: COMMERCIAL

## 2020-07-08 DIAGNOSIS — Z98.890 S/P LEFT KNEE SURGERY: Primary | ICD-10-CM

## 2020-07-08 PROCEDURE — 97140 MANUAL THERAPY 1/> REGIONS: CPT

## 2020-07-08 PROCEDURE — 97110 THERAPEUTIC EXERCISES: CPT

## 2020-07-08 NOTE — PROGRESS NOTES
Daily Note     Today's date: 2020  Patient name: Jacky Wong  : 1990  MRN: [id number]  Referring provider: Lisa Wise PA-C  Dx:   Encounter Diagnosis     ICD-10-CM    1  S/P left knee surgery Z98 890                   Subjective: Pt notes that knee is moving better, but states he has a little more pain today due to increased activity at home  Objective: See treatment diary below      Assessment: Tolerated treatment well  Patient would benefit from continued PT   Pt  able to complete all exercises with no increase in pain during or after session  Pt demonstrates some improvement with heel to toe transition during ambulation  Pt  1:1 with PTA [id number], IEP for remainder  Plan: Continue per plan of care        Precautions: none      Manuals        Knee flex/ext stretch KP 25' LH SE 25'  KP 25' MACHO 25' KP 25'       Patellar ROM KP 5' LH SE 5' KP 5' MACHO 5' KP 5'                                 Neuro Re-Ed            QS - prone             TKE - bolster 20x5"            LAQ - after stretching 2x15 2x15 2x15 2x15 2x15 2x15                                                           Ther Ex            Nu step 10' L10 10' L10 10' L10 10' L10 10' L10 10' L10       Prone hang     5# 5'        Heel slides             Sustained flexion - seated with t-band 10' mob belt 10' mob belt 10' mob belt  10' 10" 10'                                                                        Ther Activity                                       Gait Training                                       Modalities

## 2020-07-10 ENCOUNTER — OFFICE VISIT (OUTPATIENT)
Dept: PHYSICAL THERAPY | Facility: REHABILITATION | Age: 30
End: 2020-07-10
Payer: COMMERCIAL

## 2020-07-10 DIAGNOSIS — Z98.890 S/P LEFT KNEE SURGERY: Primary | ICD-10-CM

## 2020-07-10 PROCEDURE — 97112 NEUROMUSCULAR REEDUCATION: CPT | Performed by: PHYSICAL THERAPIST

## 2020-07-10 PROCEDURE — 97110 THERAPEUTIC EXERCISES: CPT | Performed by: PHYSICAL THERAPIST

## 2020-07-10 PROCEDURE — 97140 MANUAL THERAPY 1/> REGIONS: CPT | Performed by: PHYSICAL THERAPIST

## 2020-07-10 NOTE — PROGRESS NOTES
Daily Note     Today's date: 7/10/2020  Patient name: Emy Can  : 1990  MRN: [id number]  Referring provider: Sidney Queen PA-C  Dx:   Encounter Diagnosis     ICD-10-CM    1  S/P left knee surgery Z98 890                   Subjective: Patient states incision sites got very red yesterday  Not sure if it was because I was outside in the sun for about 4 hours  I took Ibuprofen and went back inside and the redness went down  I didn't see any drainage from the incisions  It still is a bit red today and slightly irritated, and my knee just hurts more in general today  Objective: See treatment diary below      Assessment: No drainage present from incision site  Slight redness yet dry  No other red flags, nor signs of infection, including no increased temperature of body, no fever, and no other symptoms reported by patient  Was educated to keep incision site cool and dry, as well as inform physician as well as PT if he is having any other symptoms  Plan: Continue per plan of care        Precautions: none      Manuals 6/26 6/29 7/1 7/2 7/6 7/8 7/10      Knee flex/ext stretch KP 25' LH SE 25'  KP 25' MACHO 25' KP 25' SE 25'      Patellar ROM KP 5' LH SE 5' KP 5' MACHO 5' KP 5' SE 5'                                Neuro Re-Ed            QS - prone             TKE - bolster 20x5"            LAQ - after stretching 2x15 2x15 2x15 2x15 2x15 2x15 2x15                                                          Ther Ex            Nu step 10' L10 10' L10 10' L10 10' L10 10' L10 10' L10 10' L10      Prone hang     5# 5'  5# 5'       Heel slides             Sustained flexion - seated with t-band 10' mob belt 10' mob belt 10' mob belt  10' 10" 10' 10'                                                                       Ther Activity                                       Gait Training                                       Modalities

## 2020-07-13 ENCOUNTER — OFFICE VISIT (OUTPATIENT)
Dept: PHYSICAL THERAPY | Facility: REHABILITATION | Age: 30
End: 2020-07-13
Payer: COMMERCIAL

## 2020-07-13 DIAGNOSIS — Z98.890 S/P LEFT KNEE SURGERY: Primary | ICD-10-CM

## 2020-07-13 PROCEDURE — 97110 THERAPEUTIC EXERCISES: CPT

## 2020-07-13 PROCEDURE — 97140 MANUAL THERAPY 1/> REGIONS: CPT

## 2020-07-13 NOTE — PROGRESS NOTES
Daily Note     Today's date: 2020  Patient name: Thien Tran  : 1990  MRN: [id number]  Referring provider: Madeleine Abreu PA-C  Dx:   Encounter Diagnosis     ICD-10-CM    1  S/P left knee surgery Z98 890                   Subjective: Pt  reports no change in status upon arrival       Objective: See treatment diary below      Assessment: Tolerated treatment well  Patient would benefit from continued PT   Pt  able to complete all exercises with no increase in pain during or after session  Pt had some minor improvement with flexion this session  Pt 1:1 with PTA [id number], IEP for remainder  Plan: Continue per plan of care        Precautions: none      Manuals 6/26 6/29 7/1 7/2 7/6 7/8 7/10 7/13     Knee flex/ext stretch KP 25' LH SE 25'  KP 25' MACHO 25' KP 25' SE 25' KP 20'     Patellar ROM KP 5' LH SE 5' KP 5' MACHO 5' KP 5' SE 5' KP 5'                               Neuro Re-Ed            QS - prone             TKE - bolster 20x5"            LAQ - after stretching 2x15 2x15 2x15 2x15 2x15 2x15 2x15 2x15                                                         Ther Ex            Nu step 10' L10 10' L10 10' L10 10' L10 10' L10 10' L10 10' L10 10' L10     Prone hang     5# 5'  5# 5'  5' 5#     Heel slides             Sustained flexion - seated with t-band 10' mob belt 10' mob belt 10' mob belt  10' 10" 10' 10' 10' belt                                                                      Ther Activity                                       Gait Training                                       Modalities

## 2020-07-15 ENCOUNTER — OFFICE VISIT (OUTPATIENT)
Dept: PHYSICAL THERAPY | Facility: REHABILITATION | Age: 30
End: 2020-07-15
Payer: COMMERCIAL

## 2020-07-15 DIAGNOSIS — Z98.890 S/P LEFT KNEE SURGERY: Primary | ICD-10-CM

## 2020-07-15 PROCEDURE — 97112 NEUROMUSCULAR REEDUCATION: CPT

## 2020-07-15 PROCEDURE — 97140 MANUAL THERAPY 1/> REGIONS: CPT

## 2020-07-15 NOTE — PROGRESS NOTES
Daily Note     Today's date: 7/15/2020  Patient name: Medina Bergeron  : 1990  MRN: [id number]  Referring provider: Karine Haas PA-C  Dx:   Encounter Diagnosis     ICD-10-CM    1  S/P left knee surgery Z98 890                   Subjective: Pt  reports no change in status upon arrival       Objective: See treatment diary below      Assessment: Tolerated treatment well  Patient would benefit from continued PT   Pt  able to complete all exercises with no increase in pain during or after session  Pt demonstrating some improvement with transition from ext > flex after extended stretch/hang  Pt  1:1 with PTA for entirety  Plan: Continue per plan of care        Precautions: none      Manuals 6/26 6/29 7/1 7/2 7/6 7/8 7/10 7/13 7/15    Knee flex/ext stretch KP 25' LH SE 25'  KP 25' MACHO 25' KP 25' SE 25' KP 20' KP 20'    Patellar ROM KP 5' LH SE 5' KP 5' MACHO 5' KP 5' SE 5' KP 5' KP 5'                              Neuro Re-Ed            QS - prone             TKE - bolster 20x5"            LAQ - after stretching 2x15 2x15 2x15 2x15 2x15 2x15 2x15 2x15 2x15                                                        Ther Ex            Nu step 10' L10 10' L10 10' L10 10' L10 10' L10 10' L10 10' L10 10' L10 10' L10    Prone hang     5# 5'  5# 5'  5' 5# 5' 5#    Heel slides             Sustained flexion - seated with t-band 10' mob belt 10' mob belt 10' mob belt  10' 10" 10' 10' 10' belt 10' belt                                                                     Ther Activity                                       Gait Training                                       Modalities

## 2020-07-21 ENCOUNTER — OFFICE VISIT (OUTPATIENT)
Dept: PHYSICAL THERAPY | Facility: REHABILITATION | Age: 30
End: 2020-07-21
Payer: COMMERCIAL

## 2020-07-21 DIAGNOSIS — Z98.890 S/P LEFT KNEE SURGERY: Primary | ICD-10-CM

## 2020-07-21 PROCEDURE — 97140 MANUAL THERAPY 1/> REGIONS: CPT

## 2020-07-21 PROCEDURE — 97112 NEUROMUSCULAR REEDUCATION: CPT

## 2020-07-21 PROCEDURE — 97110 THERAPEUTIC EXERCISES: CPT

## 2020-07-21 NOTE — PROGRESS NOTES
Daily Note     Today's date: 2020  Patient name: Angela Bertrand  : 1990  MRN: [id number]  Referring provider: Starla aCli PA-C  Dx:   Encounter Diagnosis     ICD-10-CM    1  S/P left knee surgery Z98 890                   Subjective: Pt offers no new complaints  Pt states he was in pool this weekend, and it felt beneficial to move in the water  Objective: See treatment diary below      Assessment: Tolerated treatment well  Patient would benefit from continued PT   Pt  able to complete all exercises with no increase in pain during or after session  Pt able to perform some additional exercises this session with no complaint  Pt remains limited in flexion and extension  Pt  1:1 with PTA for entirety  Plan: Continue per plan of care        Precautions: none      Manuals 6/26 6/29 7/1 7/2 7/6 7/8 7/10 7/13 7/15 7/21   Knee flex/ext stretch KP 25' LH SE 25'  KP 25' MACHO 25' KP 25' SE 25' KP 20' KP 20' KP 20'   Patellar ROM KP 5' LH SE 5' KP 5' MACHO 5' KP 5' SE 5' KP 5' KP 5' KP 5'                             Neuro Re-Ed            QS - prone             TKE - bolster 20x5"            LAQ - after stretching 2x15 2x15 2x15 2x15 2x15 2x15 2x15 2x15 2x15 2x15   Assisted flexion w/ R leg          2x15                                          Ther Ex            Nu step 10' L10 10' L10 10' L10 10' L10 10' L10 10' L10 10' L10 10' L10 10' L10 10' L10   Prone hang     5# 5'  5# 5'  5' 5# 5' 5# 5' 5#   Heel slides             Sustained flexion - seated with t-band 10' mob belt 10' mob belt 10' mob belt  10' 10" 10' 10' 10' belt 10' belt 10' belt                                                                    Ther Activity                                       Gait Training                                       Modalities

## 2020-07-22 ENCOUNTER — OFFICE VISIT (OUTPATIENT)
Dept: PHYSICAL THERAPY | Facility: REHABILITATION | Age: 30
End: 2020-07-22
Payer: COMMERCIAL

## 2020-07-22 DIAGNOSIS — Z98.890 S/P LEFT KNEE SURGERY: Primary | ICD-10-CM

## 2020-07-22 PROCEDURE — 97140 MANUAL THERAPY 1/> REGIONS: CPT | Performed by: PHYSICAL THERAPIST

## 2020-07-22 PROCEDURE — 97110 THERAPEUTIC EXERCISES: CPT | Performed by: PHYSICAL THERAPIST

## 2020-07-22 NOTE — PROGRESS NOTES
Daily Note     Today's date: 2020  Patient name: Mati Miller  : 1990  MRN: [id number]  Referring provider: Latonia Pruitt PA-C  Dx:   Encounter Diagnosis     ICD-10-CM    1  S/P left knee surgery Z98 890                   Subjective: Soreness noted from previous session      Objective: See treatment diary below      Assessment: Stiffness persists  Plan: Continue per plan of care        Precautions: none      Manuals             Knee flex/ext stretch MACHO 20'            Patellar ROM KP 5'                                      Neuro Re-Ed             QS - prone             TKE - bolster 20x5"            LAQ - after stretching 2x15            Assisted flexion w/ R leg 2x15                                                   Ther Ex             Nu step 10' L10            Prone hang 5# 5'            Heel slides             Sustained flexion - seated with t-band 10' mob belt                                                                             Ther Activity                                       Gait Training                                       Modalities

## 2020-07-24 ENCOUNTER — OFFICE VISIT (OUTPATIENT)
Dept: PHYSICAL THERAPY | Facility: REHABILITATION | Age: 30
End: 2020-07-24
Payer: COMMERCIAL

## 2020-07-24 DIAGNOSIS — Z98.890 S/P LEFT KNEE SURGERY: Primary | ICD-10-CM

## 2020-07-24 PROCEDURE — 97112 NEUROMUSCULAR REEDUCATION: CPT | Performed by: PHYSICAL THERAPIST

## 2020-07-24 PROCEDURE — 97110 THERAPEUTIC EXERCISES: CPT | Performed by: PHYSICAL THERAPIST

## 2020-07-24 PROCEDURE — 97140 MANUAL THERAPY 1/> REGIONS: CPT | Performed by: PHYSICAL THERAPIST

## 2020-07-24 NOTE — PROGRESS NOTES
Daily Note     Today's date: 2020  Patient name: Jade Kam  : 1990  MRN: [id number]  Referring provider: Elisabeth Jefferson PA-C  Dx:   Encounter Diagnosis     ICD-10-CM    1  S/P left knee surgery Z98 890        Start Time: 08  Stop Time: 09  Total time in clinic (min): 60 minutes    Subjective: I feel that I am improving slowly and getting better each day  My knee does still feel tight and I do still get pain  Objective: See treatment diary below      Assessment: Did better with prone knee hangs today by tolerating longer time as well as more weight on his leg  Does still have difficulty accepting weight on left lower extremity when in midstance of gait as well as gaining terminal knee extension, which is leading to an abnormal gait  He will benefit from skilled physical to continue working on improving terminal knee extension, knee flexion range of motion, quad neuro re-education, quality of gait, and overall functional capacity  Plan: Continue per plan of care        Precautions: none      Manuals            Knee flex/ext stretch MACHO 20' SE 20'            Patellar ROM KP 5' SE 5'                                     Neuro Re-Ed             QS - prone             TKE - bolster 20x5" 20x5"           LAQ - after stretching 2x15 2x15           Assisted flexion w/ R leg 2x15 2x15                                                  Ther Ex             Nu step 10' L10 10' L10           Prone hang 5# 5' 8# 10'           Heel slides             Sustained flexion - seated with t-band 10' mob belt 10' mob belt                                                                             Ther Activity                                       Gait Training                                       Modalities

## 2020-07-28 ENCOUNTER — OFFICE VISIT (OUTPATIENT)
Dept: PHYSICAL THERAPY | Facility: REHABILITATION | Age: 30
End: 2020-07-28
Payer: COMMERCIAL

## 2020-07-28 DIAGNOSIS — Z98.890 S/P LEFT KNEE SURGERY: Primary | ICD-10-CM

## 2020-07-28 PROCEDURE — 97112 NEUROMUSCULAR REEDUCATION: CPT

## 2020-07-28 PROCEDURE — 97110 THERAPEUTIC EXERCISES: CPT

## 2020-07-28 PROCEDURE — 97140 MANUAL THERAPY 1/> REGIONS: CPT

## 2020-07-28 NOTE — PROGRESS NOTES
Daily Note     Today's date: 2020  Patient name: Ismael Seals  : 1990  MRN: [id number]  Referring provider: Yasmine Gomez PA-C  Dx:   Encounter Diagnosis     ICD-10-CM    1  S/P left knee surgery Z98 890                   Subjective: Pt reports he had increased activity at home this weekend, had residual soreness as a result      Objective: See treatment diary below      Assessment: Tolerated treatment well  Patient would benefit from continued PT   Pt  able to complete all exercises with no increase in pain during or after session  Pt  1:1 with PTA for entirety  Plan: Continue per plan of care        Precautions: none      Manuals           Knee flex/ext stretch MACHO 20' SE 20'  KP 20'          Patellar ROM KP 5' SE 5' KP 5'                                    Neuro Re-Ed             QS - prone             TKE - bolster 20x5" 20x5" 20x5"          LAQ - after stretching 2x15 2x15 2x15          Assisted flexion w/ R leg 2x15 2x15 2x15                                                 Ther Ex             Nu step 10' L10 10' L10 10' RB          Prone hang 5# 5' 8# 10' 10' 8#          Heel slides             Sustained flexion - seated with t-band 10' mob belt 10' mob belt  10' belt                                                                           Ther Activity                                       Gait Training                                       Modalities

## 2020-08-03 ENCOUNTER — APPOINTMENT (OUTPATIENT)
Dept: PHYSICAL THERAPY | Facility: REHABILITATION | Age: 30
End: 2020-08-03
Payer: COMMERCIAL

## 2020-08-04 ENCOUNTER — OFFICE VISIT (OUTPATIENT)
Dept: PHYSICAL THERAPY | Facility: REHABILITATION | Age: 30
End: 2020-08-04
Payer: COMMERCIAL

## 2020-08-04 DIAGNOSIS — Z98.890 S/P LEFT KNEE SURGERY: Primary | ICD-10-CM

## 2020-08-04 PROCEDURE — 97140 MANUAL THERAPY 1/> REGIONS: CPT

## 2020-08-04 PROCEDURE — 97110 THERAPEUTIC EXERCISES: CPT

## 2020-08-04 NOTE — PROGRESS NOTES
Daily Note     Today's date: 2020  Patient name: Ana Darden  : 1990  MRN: [id number]  Referring provider: Squire Severs, PA-C  Dx:   Encounter Diagnosis     ICD-10-CM    1  S/P left knee surgery  Z98 890         1:1 with PTA CR 8:45-9:30  1:1 with PT CW 9:30-  9:45  Subjective: Internal suture visible distal tibial incision with TTP  Advised to monitor for infection  Objective: See treatment diary below      Assessment: Tolerated treatment well  Continues with severe ROM deficits  Patient would benefit from continued PT  Plan: Continue per plan of care        Precautions: none      Manuals          Knee flex/ext stretch MACHO 20' SE 20'  KP 20' CR  20 mins         Patellar ROM KP 5' SE 5' KP 5' CR  5 mins                                   Neuro Re-Ed             QS - prone             TKE - bolster 20x5" 20x5" 20x5" 5"x20         LAQ - after stretching 2x15 2x15 2x15 2x15         Assisted flexion w/ R leg 2x15 2x15 2x15 3x10                                                Ther Ex             Nu step 10' L10 10' L10 10' RB L10  10 mins         Prone hang 5# 5' 8# 10' 10' 8# 8#  10 mins         Heel slides             Sustained flexion - seated with t-band 10' mob belt 10' mob belt  10' belt 10 min  belt                                                                          Ther Activity                                       Gait Training                                       Modalities

## 2020-08-06 ENCOUNTER — OFFICE VISIT (OUTPATIENT)
Dept: PHYSICAL THERAPY | Facility: REHABILITATION | Age: 30
End: 2020-08-06
Payer: COMMERCIAL

## 2020-08-06 DIAGNOSIS — Z98.890 S/P LEFT KNEE SURGERY: Primary | ICD-10-CM

## 2020-08-06 PROCEDURE — 97140 MANUAL THERAPY 1/> REGIONS: CPT | Performed by: PHYSICAL THERAPIST

## 2020-08-06 PROCEDURE — 97110 THERAPEUTIC EXERCISES: CPT | Performed by: PHYSICAL THERAPIST

## 2020-08-06 NOTE — PROGRESS NOTES
Daily Note     Today's date: 2020  Patient name: Shar Vidal  : 1990  MRN: [id number]  Referring provider: Mahnaz House PA-C  Dx:   Encounter Diagnosis     ICD-10-CM    1  S/P left knee surgery  Z98 890                   Subjective: No new complaints  Objective: See treatment diary below      Assessment:       Plan: Continue per plan of care        Precautions: none      Manuals         Knee flex/ext stretch MACHO 20' SE 20'  KP 20' CR  20 mins MACHO 20'        Patellar ROM KP 5' SE 5' KP 5' CR  5 mins MACHO 5'                                  Neuro Re-Ed             QS - prone             TKE - bolster 20x5" 20x5" 20x5" 5"x20 5"x20        LAQ - after stretching 2x15 2x15 2x15 2x15 2x15        Assisted flexion w/ R leg 2x15 2x15 2x15 3x10 3x10                                               Ther Ex             Nu step 10' L10 10' L10 10' RB L10  10 mins L10 10'        Prone hang 5# 5' 8# 10' 10' 8# 8#  10 mins 10# 10min        Heel slides             Sustained flexion - seated with t-band 10' mob belt 10' mob belt  10' belt 10 min  belt 10'        squats     5s x20                                                            Ther Activity                                       Gait Training                                       Modalities

## 2020-08-07 ENCOUNTER — APPOINTMENT (OUTPATIENT)
Dept: PHYSICAL THERAPY | Facility: REHABILITATION | Age: 30
End: 2020-08-07
Payer: COMMERCIAL

## 2020-08-12 ENCOUNTER — OFFICE VISIT (OUTPATIENT)
Dept: PHYSICAL THERAPY | Facility: REHABILITATION | Age: 30
End: 2020-08-12
Payer: COMMERCIAL

## 2020-08-12 DIAGNOSIS — Z98.890 S/P LEFT KNEE SURGERY: Primary | ICD-10-CM

## 2020-08-12 PROCEDURE — 97110 THERAPEUTIC EXERCISES: CPT

## 2020-08-12 PROCEDURE — 97112 NEUROMUSCULAR REEDUCATION: CPT

## 2020-08-12 PROCEDURE — 97140 MANUAL THERAPY 1/> REGIONS: CPT

## 2020-08-12 NOTE — PROGRESS NOTES
Daily Note     Today's date: 2020  Patient name: Ap Ji  : 1990  MRN: [id number]  Referring provider: Bo Martin PA-C  Dx:   Encounter Diagnosis     ICD-10-CM    1  S/P left knee surgery  Z98 890                   Subjective: Pt reports increased pain and soreness in L knee upon arrival and during session  Pt reports a fall occurred over the weekend, but does not believe any injuries occurred resulting from the fall  Pt reported feeling better after session, but still had pain present  Objective: See treatment diary below      Assessment: Tolerated treatment well  Patient would benefit from continued PT  Pt demonstrated reduced tolerance for knee flexion this session due to pain in knee  Pt able to add some exercises this session without complaint  Pt was 1:1 with PTA from [id number]  IEP from [id number]  Plan: Continue per plan of care        Precautions: none      Manuals        Knee flex/ext stretch MACHO 20' SE 20'  KP 20' CR  20 mins MACHO 20' KP 15'       Patellar ROM KP 5' SE 5' KP 5' CR  5 mins MACHO 5' KP 5'                                 Neuro Re-Ed             QS - prone             TKE - bolster 20x5" 20x5" 20x5" 5"x20 5"x20 decl       LAQ - after stretching 2x15 2x15 2x15 2x15 2x15 decl       Assisted flexion w/ R leg 2x15 2x15 2x15 3x10 3x10 decl                                              Ther Ex             Nu step 10' L10 10' L10 10' RB L10  10 mins L10 10' L10 10'       Prone hang 5# 5' 8# 10' 10' 8# 8#  10 mins 10# 10min 5# 10'       Heel slides             Sustained flexion - seated with t-band 10' mob belt 10' mob belt  10' belt 10 min  belt 10' 10'       squats     5s x20 5s 30x       Sidestepping      5 laps                                              Ther Activity                                       Gait Training                                       Modalities

## 2020-08-18 ENCOUNTER — OFFICE VISIT (OUTPATIENT)
Dept: PHYSICAL THERAPY | Facility: REHABILITATION | Age: 30
End: 2020-08-18
Payer: COMMERCIAL

## 2020-08-18 DIAGNOSIS — Z98.890 S/P LEFT KNEE SURGERY: Primary | ICD-10-CM

## 2020-08-18 PROCEDURE — 97110 THERAPEUTIC EXERCISES: CPT | Performed by: PHYSICAL THERAPIST

## 2020-08-18 PROCEDURE — 97140 MANUAL THERAPY 1/> REGIONS: CPT | Performed by: PHYSICAL THERAPIST

## 2020-08-18 NOTE — PROGRESS NOTES
Daily Note     Today's date: 2020  Patient name: Shar Vidal  : 1990  MRN: [id number]  Referring provider: Mahnaz House PA-C  Dx:   Encounter Diagnosis     ICD-10-CM    1  S/P left knee surgery  Z98 890                   Subjective: Reports feeling some increased pain and soreness today  Objective: See treatment diary below      Assessment: Tolerated treatment well  Patient would benefit from continued PT, continues to be limited in L knee range of motion and reports increased pain with end range flexion and extension  Plan: Continue per plan of care        Precautions: none      Manuals        Knee flex/ext stretch MACHO 20' SE 20'  KP 20' CR  20 mins MACHO 20' KP 15' CW 15'      Patellar ROM KP 5' SE 5' KP 5' CR  5 mins MACHO 5' KP 5' CW 5'                                 Neuro Re-Ed             QS - prone             TKE - bolster 20x5" 20x5" 20x5" 5"x20 5"x20 decl       LAQ - after stretching 2x15 2x15 2x15 2x15 2x15 decl 2x15       Assisted flexion w/ R leg 2x15 2x15 2x15 3x10 3x10 decl 3x10                                              Ther Ex             Nu step 10' L10 10' L10 10' RB L10  10 mins L10 10' L10 10' L10 10'       Prone hang 5# 5' 8# 10' 10' 8# 8#  10 mins 10# 10min 5# 10' 5# 10'       Heel slides             Sustained flexion - seated with t-band 10' mob belt 10' mob belt  10' belt 10 min  belt 10' 10' 10'       squats     5s x20 5s 30x 30x5"       Sidestepping      5 laps 5 laps                                              Ther Activity                                       Gait Training                                       Modalities

## 2020-08-24 ENCOUNTER — OFFICE VISIT (OUTPATIENT)
Dept: PHYSICAL THERAPY | Facility: REHABILITATION | Age: 30
End: 2020-08-24
Payer: COMMERCIAL

## 2020-08-24 DIAGNOSIS — Z98.890 S/P LEFT KNEE SURGERY: Primary | ICD-10-CM

## 2020-08-24 PROCEDURE — 97140 MANUAL THERAPY 1/> REGIONS: CPT

## 2020-08-24 PROCEDURE — 97110 THERAPEUTIC EXERCISES: CPT

## 2020-08-24 PROCEDURE — 97112 NEUROMUSCULAR REEDUCATION: CPT

## 2020-08-24 NOTE — PROGRESS NOTES
Daily Note     Today's date: 2020  Patient name: Ebony Morales  : 1990  MRN: [id number]  Referring provider: Mandy Stewart PA-C  Dx:   Encounter Diagnosis     ICD-10-CM    1  S/P left knee surgery  Z98 890                   Subjective: Pt reports he is feeling ok today  Pt reports concern involving redness/discomfort around 1 in  down post  Lat scar , was causing pain last night  Objective: See treatment diary below      Assessment:  Ariela Perez PT informed of pt's report  PT told pt to monitor area and draw [place] around area for comparison if redness expands  Pt performed all exercises without an increase in pain during or after session  Tolerated treatment well  Patient would benefit from continued PT  Pt was 1:1 with SPTA from [id number]  IEP from [phone]  Treated by MELECIO Rosado under direct supervision from Hai Teague PTA      Plan: Continue per plan of care         Precautions: none      Manuals      Knee flex/ext stretch MACHO 20' SE 20'  KP 20' CR  20 mins MACHO 20' KP 15' CW 13' KM 15'     Patellar ROM KP 5' SE 5' KP 5' CR  5 mins MACHO 5' KP 5' CW 5'  KM 2'                               Neuro Re-Ed             QS - prone             TKE - bolster 20x5" 20x5" 20x5" 5"x20 5"x20 decl       LAQ - after stretching 2x15 2x15 2x15 2x15 2x15 decl 2x15  2 x15      Assisted flexion w/ R leg 2x15 2x15 2x15 3x10 3x10 decl 3x10  np                                            Ther Ex             Nu step 10' L10 10' L10 10' RB L10  10 mins L10 10' L10 10' L10 10'  L10 10'     Prone hang 5# 5' 8# 10' 10' 8# 8#  10 mins 10# 10min 5# 10' 5# 10'  5# 5'     Heel slides             Sustained flexion - seated with t-band 10' mob belt 10' mob belt  10' belt 10 min  belt 10' 10' 10'  np     squats     5s x20 5s 30x 30x5"  30x5"     Sidestepping      5 laps 5 laps  5 laps                                            Ther Activity Gait Training                                       Modalities

## 2020-08-26 ENCOUNTER — TELEPHONE (OUTPATIENT)
Dept: OTHER | Facility: OTHER | Age: 30
End: 2020-08-26

## 2020-08-26 ENCOUNTER — APPOINTMENT (OUTPATIENT)
Dept: PHYSICAL THERAPY | Facility: REHABILITATION | Age: 30
End: 2020-08-26
Payer: COMMERCIAL

## 2020-08-27 ENCOUNTER — OFFICE VISIT (OUTPATIENT)
Dept: OBGYN CLINIC | Facility: HOSPITAL | Age: 30
End: 2020-08-27

## 2020-08-27 VITALS
HEART RATE: 73 BPM | DIASTOLIC BLOOD PRESSURE: 79 MMHG | SYSTOLIC BLOOD PRESSURE: 135 MMHG | WEIGHT: 162.8 LBS | BODY MASS INDEX: 27.94 KG/M2

## 2020-08-27 DIAGNOSIS — Z98.890 STATUS POST SURGERY: Primary | ICD-10-CM

## 2020-08-27 PROCEDURE — 99024 POSTOP FOLLOW-UP VISIT: CPT | Performed by: ORTHOPAEDIC SURGERY

## 2020-08-27 NOTE — TELEPHONE ENCOUNTER
Just Spoke with patient he explain he was a bleeding yesterday where he had his last incision is not bleeding not more, now he has a scab he didn't want touch it or anything he is asking if he needs to be seen or he should wait to see what happens

## 2020-08-27 NOTE — TELEPHONE ENCOUNTER
MSG: PT STATED THEY STARTED BLEEDING FROM SURGERY INCISION SITE AROUND 6 OR 7PM TODAY 8/26- PT STATED THEY GOT THE BLEEDING UNDER CONTROL AS OF RIGHT NOW BUT WHEN PRESSURE IS APPLIED IT STARTS TO BLEED- PT STATED THEY ARE NOT IN PAIN AND DESCRIBED THE PAIN AS MAYBE A 1 OR 2 ON A SCALE OF 1-10  PT DID NOT WANT TO SPEAK TO ON-CALL BUT RATHER WAIT FOR THE OFFICE TO OPEN IN THE MORNING 8/27 AND MAKE AN APPT AS SOON AS POSSIBLE

## 2020-08-27 NOTE — TELEPHONE ENCOUNTER
Spoke to patient, he stated he has been experiencing bleeding from the incision site from a pinpoint hole  Patient stated that when he bends the knee, more blood comes out  States there is redness as well  At 2 5 months PO patient should not be bleeding still  Appointment on hold for 3:15 for this patient so I did schedule him in that slot for evaluation  [place] in the office aware as well
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
## 2020-08-31 ENCOUNTER — APPOINTMENT (OUTPATIENT)
Dept: PHYSICAL THERAPY | Facility: REHABILITATION | Age: 30
End: 2020-08-31
Payer: COMMERCIAL

## 2020-09-01 ENCOUNTER — OFFICE VISIT (OUTPATIENT)
Dept: OBGYN CLINIC | Facility: HOSPITAL | Age: 30
End: 2020-09-01

## 2020-09-01 ENCOUNTER — OFFICE VISIT (OUTPATIENT)
Dept: PHYSICAL THERAPY | Facility: REHABILITATION | Age: 30
End: 2020-09-01
Payer: COMMERCIAL

## 2020-09-01 VITALS — DIASTOLIC BLOOD PRESSURE: 79 MMHG | SYSTOLIC BLOOD PRESSURE: 174 MMHG | HEART RATE: 79 BPM

## 2020-09-01 DIAGNOSIS — Z98.890 S/P LEFT KNEE SURGERY: Primary | ICD-10-CM

## 2020-09-01 DIAGNOSIS — Z98.890 STATUS POST SURGERY: Primary | ICD-10-CM

## 2020-09-01 PROCEDURE — 97110 THERAPEUTIC EXERCISES: CPT | Performed by: PHYSICAL THERAPIST

## 2020-09-01 PROCEDURE — 99024 POSTOP FOLLOW-UP VISIT: CPT | Performed by: ORTHOPAEDIC SURGERY

## 2020-09-01 PROCEDURE — 97140 MANUAL THERAPY 1/> REGIONS: CPT | Performed by: PHYSICAL THERAPIST

## 2020-09-01 PROCEDURE — 97112 NEUROMUSCULAR REEDUCATION: CPT | Performed by: PHYSICAL THERAPIST

## 2020-09-01 NOTE — LETTER
September 1, 2020     Patient: Orlando Anguiano   YOB: 1990   Date of Visit: 9/1/2020       To Whom it May Concern:    Orlando Anguiano is under my professional care  He was seen in my office on 9/1/2020  He may return to work on 09/02/2020 without restriction  If you have any questions or concerns, please don't hesitate to call           Sincerely,          Topher Crump MD        CC: No Recipients

## 2020-09-01 NOTE — PROGRESS NOTES
Daily Note     Today's date: 2020  Patient name: Lynette Dawkins  : 1990  MRN: [id number]  Referring provider: Julissa Harman PA-C  Dx: No diagnosis found  Subjective: Patient reported being very sore today upon arrival for treatment  He reported not doing exercises over the weekend but hoping to return to work as early as tomorrow  Objective: See treatment diary below      Assessment: Patient was very stiff and sore but tolerated treatment well  The stiffness and pain could be related to the inactivity over the weekend  Patient was apprehensive to OP flexion and extension stretching  Patient had to support his leg frequently during prone extension hangs  Patient reported a deep pain on the medial side of the knee with stretching and exercise  Patient needed cued to shift weight anteriorly and increasing trunk flexion while performing squats  Plan: Continue per plan of care        Precautions: none      Manuals  8    Knee flex/ext stretch MACHO 20' SE 20'  KP 20' CR  20 mins MACHO 20' KP 15' CW 13' KM 15' BR 15'    Patellar ROM KP 5' SE 5' KP 5' CR  5 mins MACHO 5' KP 5' CW 5'  KM 2' BR 5'                              Neuro Re-Ed             QS - prone             TKE - bolster 20x5" 20x5" 20x5" 5"x20 5"x20 decl       LAQ - after stretching 2x15 2x15 2x15 2x15 2x15 decl 2x15  2 x15      Assisted flexion w/ R leg 2x15 2x15 2x15 3x10 3x10 decl 3x10  np                                            Ther Ex             Nu step 10' L10 10' L10 10' RB L10  10 mins L10 10' L10 10' L10 10'  L10 10' L10 10'    Prone hang 5# 5' 8# 10' 10' 8# 8#  10 mins 10# 10min 5# 10' 5# 10'  5# 5'     Heel slides             Sustained flexion - seated with t-band 10' mob belt 10' mob belt  10' belt 10 min  belt 10' 10' 10'  np     squats     5s x20 5s 30x 30x5"  30x5" 30x5"    Sidestepping      5 laps 5 laps  5 laps 3 laps Ther Activity                                       Gait Training                                       Modalities
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
## 2020-09-17 ENCOUNTER — OFFICE VISIT (OUTPATIENT)
Dept: PHYSICAL THERAPY | Facility: REHABILITATION | Age: 30
End: 2020-09-17
Payer: COMMERCIAL

## 2020-09-17 DIAGNOSIS — Z98.890 S/P LEFT KNEE SURGERY: Primary | ICD-10-CM

## 2020-09-17 PROCEDURE — 97110 THERAPEUTIC EXERCISES: CPT | Performed by: PHYSICAL THERAPIST

## 2020-09-17 PROCEDURE — 97140 MANUAL THERAPY 1/> REGIONS: CPT | Performed by: PHYSICAL THERAPIST

## 2020-09-17 NOTE — PROGRESS NOTES
Daily Note     Today's date: 2020  Patient name: Veronica Payne  : 1990  MRN: [id number]  Referring provider: Rolland Eisenmenger, PA-C  Dx:   Encounter Diagnosis     ICD-10-CM    1  S/P left knee surgery  Z98 890                   Subjective: Patient has been working for the past 2 weeks and he is "feeling good"  He has been working without restriction and is able to tolerate standing, lifting and driving without issue  He reports that he still feels significantly limited into extension and it makes it difficult for him to stand without moving constantly  Objective: See treatment diary below      Assessment: Patient was stiff today but tolerated treatment well  He struggled to achieve depth with squats and reported anterior knee soreness  Continues to demonstrate limitation into extension even after stretching  He reported increased knee soreness at the end of tx, but he reported feeling more "comfortable" with ambulation  Plan: Continue per plan of care        Precautions: none      Manuals  8   Knee flex/ext stretch MACHO 20' SE 20'  KP 20' CR  20 mins MACHO 20' KP 15' CW 15' KM 15' BR 15' TB 13'   Patellar ROM KP 5' SE 5' KP 5' CR  5 mins MACHO 5' KP 5' CW 5'  KM 2' BR 5' TB 2'                             Neuro Re-Ed             QS - prone             TKE - bolster 20x5" 20x5" 20x5" 5"x20 5"x20 decl       LAQ - after stretching 2x15 2x15 2x15 2x15 2x15 decl 2x15  2 x15   2x15   Assisted flexion w/ R leg 2x15 2x15 2x15 3x10 3x10 decl 3x10  np                                            Ther Ex             Nu step 10' L10 10' L10 10' RB L10  10 mins L10 10' L10 10' L10 10'  L10 10' L10 10' L 10 10'   Prone hang 5# 5' 8# 10' 10' 8# 8#  10 mins 10# 10min 5# 10' 5# 10'  5# 5'  5# 5'   Heel slides             Sustained flexion - seated with t-band 10' mob belt 10' mob belt  10' belt 10 min  belt 10' 10' 10'  np 10' 10'   squats     5s x20 5s 30x 30x5" 30x5" 30x5" 30x5"   Sidestepping      5 laps 5 laps  5 laps 3 laps  3 laps                                          Ther Activity                                       Gait Training                                       Modalities

## 2020-10-07 ENCOUNTER — OFFICE VISIT (OUTPATIENT)
Dept: PHYSICAL THERAPY | Facility: REHABILITATION | Age: 30
End: 2020-10-07
Payer: COMMERCIAL

## 2020-10-07 DIAGNOSIS — Z98.890 S/P LEFT KNEE SURGERY: Primary | ICD-10-CM

## 2020-10-07 PROCEDURE — 97140 MANUAL THERAPY 1/> REGIONS: CPT

## 2020-10-07 PROCEDURE — 97110 THERAPEUTIC EXERCISES: CPT

## 2020-10-12 ENCOUNTER — APPOINTMENT (OUTPATIENT)
Dept: PHYSICAL THERAPY | Facility: REHABILITATION | Age: 30
End: 2020-10-12
Payer: COMMERCIAL

## 2020-10-15 ENCOUNTER — OFFICE VISIT (OUTPATIENT)
Dept: PHYSICAL THERAPY | Facility: REHABILITATION | Age: 30
End: 2020-10-15
Payer: COMMERCIAL

## 2020-10-15 DIAGNOSIS — Z98.890 S/P LEFT KNEE SURGERY: Primary | ICD-10-CM

## 2020-10-15 PROCEDURE — 97110 THERAPEUTIC EXERCISES: CPT | Performed by: PHYSICAL THERAPIST

## 2020-10-15 PROCEDURE — 97140 MANUAL THERAPY 1/> REGIONS: CPT | Performed by: PHYSICAL THERAPIST

## 2020-10-16 ENCOUNTER — TELEPHONE (OUTPATIENT)
Dept: OBGYN CLINIC | Facility: HOSPITAL | Age: 30
End: 2020-10-16

## 2020-10-16 NOTE — PROGRESS NOTES
Daily Note     Today's date: 2019  Patient name: Daryl Jones  : 1990  MRN: [id number]  Referring provider: Carlos Cavanaugh PA-C  Dx:   Encounter Diagnosis     ICD-10-CM    1  Status post surgery [id number] [id number]                   Subjective:  No new complaints      Objective: See treatment diary below      Assessment: Able to activate hamstrings in available passive range, but remains difficult to maintain  Plan: Continue per plan of care        Manual    25 mins 12/3 12/5 12/9 12/18 12/19 12/26 12/30     Patellar mob CR  ROM  5 mins SD 5' KP 4' rom KP 4' rom CR  4 mins MACHO 4' KP 2' MACHO 3'     Seated knee flex LEFT             Supine knee ext st CR  10 mins SD 10' KP 5' KP 5' CR  8 mins MACHO 8' KP 5' Macho 5'     Prone knee flex CR  10 mins SD 10' KP 5' np NP MACHO 5' KP 5' MACHO 5'     L Calf / HA stretch  SD 5' KP 3' KP 2' CR  8 mins   MACHO  KP 2' MACHO 2'           Exercise Diary  11/25 12/3 12/5 12/9 12/18 12/19 12/26 12/30     Nu Step NuStep  10 mins Nu step 10' 10' L10 10' L10 L10  10 mins L10 x10 10' L10 10' L10     Quad sets             SAQ 3"x20 3"x20 20x3" 30x5" 5"x30 5" x30 30x 5" 30s x5     SLR - flex             Prone knee flexion 30 30 30 30 30 30 30 30     SLS             Extended knee flex             sidestepping             Calf/hamstring stretch             Prone knee flex stretch             Prone knee ext 10' 5# 10'x5 10' 5# 10' supine w/strap on table 10 mins  Supine  W/strap  on table With [facility] 5# Prone hang x12' With [facility] 5# prone hang 10' With  10# 10'      Leg press prolonged stretch NV             Prone hang              Treadmill 5 mins             Supine extension stretch with strap on table       10' 10'                         Modalities                                  [Consultation] : a consultation regarding [Chest Pain] : chest pain

## 2020-10-20 ENCOUNTER — OFFICE VISIT (OUTPATIENT)
Dept: PHYSICAL THERAPY | Facility: REHABILITATION | Age: 30
End: 2020-10-20
Payer: COMMERCIAL

## 2020-10-20 DIAGNOSIS — Z98.890 S/P LEFT KNEE SURGERY: Primary | ICD-10-CM

## 2020-10-20 PROCEDURE — 97110 THERAPEUTIC EXERCISES: CPT

## 2020-10-20 PROCEDURE — 97140 MANUAL THERAPY 1/> REGIONS: CPT

## 2020-10-27 ENCOUNTER — TELEPHONE (OUTPATIENT)
Dept: OBGYN CLINIC | Facility: HOSPITAL | Age: 30
End: 2020-10-27

## 2020-10-29 ENCOUNTER — OFFICE VISIT (OUTPATIENT)
Dept: PHYSICAL THERAPY | Facility: REHABILITATION | Age: 30
End: 2020-10-29
Payer: COMMERCIAL

## 2020-10-29 DIAGNOSIS — Z98.890 S/P LEFT KNEE SURGERY: Primary | ICD-10-CM

## 2020-10-29 PROCEDURE — 97140 MANUAL THERAPY 1/> REGIONS: CPT | Performed by: PHYSICAL THERAPIST

## 2020-10-29 PROCEDURE — 97110 THERAPEUTIC EXERCISES: CPT | Performed by: PHYSICAL THERAPIST

## 2021-01-04 ENCOUNTER — TELEPHONE (OUTPATIENT)
Dept: OBGYN CLINIC | Facility: HOSPITAL | Age: 31
End: 2021-01-04

## 2021-01-05 NOTE — TELEPHONE ENCOUNTER
Pt left a vm stating that he is getting over charged for his PT and that his insurance company is requesting a letter from the provider stating why it was necessary for pt to go through PT    Please advise  Pt can be reached at [phone]

## 2021-01-06 NOTE — TELEPHONE ENCOUNTER
Patient is calling in wanting to know if his letter is ready for him to stop by the office and , he is going to be stopping by today to obtain this

## 2021-01-06 NOTE — TELEPHONE ENCOUNTER
Spoke with patient form was faxed to number provided  Patient still has a question he will like to know why he still needs to go of Physical Therapy?

## 2021-01-06 NOTE — TELEPHONE ENCOUNTER
Spoke with patient aware of message, but actually he is asking for a Letter explaining why he need it Physical Therapy  Back them his last date was 10-, Thank You!

## 2021-01-06 NOTE — TELEPHONE ENCOUNTER
Patient called with fax #[phone] for [place]  Please fax explanation as to why it is necessary for him to go to physical therapy    Thank you

## 2021-03-12 ENCOUNTER — OFFICE VISIT (OUTPATIENT)
Dept: PHYSICAL THERAPY | Facility: REHABILITATION | Age: 31
End: 2021-03-12
Payer: COMMERCIAL

## 2021-03-12 DIAGNOSIS — G89.29 CHRONIC PAIN OF LEFT KNEE: Primary | ICD-10-CM

## 2021-03-12 DIAGNOSIS — M25.562 CHRONIC PAIN OF LEFT KNEE: Primary | ICD-10-CM

## 2021-03-12 PROCEDURE — 97161 PT EVAL LOW COMPLEX 20 MIN: CPT | Performed by: PHYSICAL THERAPIST

## 2021-03-12 PROCEDURE — 97140 MANUAL THERAPY 1/> REGIONS: CPT | Performed by: PHYSICAL THERAPIST

## 2021-03-12 PROCEDURE — 97110 THERAPEUTIC EXERCISES: CPT | Performed by: PHYSICAL THERAPIST
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
## 2021-03-15 ENCOUNTER — OFFICE VISIT (OUTPATIENT)
Dept: PHYSICAL THERAPY | Facility: REHABILITATION | Age: 31
End: 2021-03-15
Payer: COMMERCIAL

## 2021-03-15 DIAGNOSIS — M25.562 CHRONIC PAIN OF LEFT KNEE: Primary | ICD-10-CM

## 2021-03-15 DIAGNOSIS — G89.29 CHRONIC PAIN OF LEFT KNEE: Primary | ICD-10-CM

## 2021-03-15 PROCEDURE — 97140 MANUAL THERAPY 1/> REGIONS: CPT | Performed by: PHYSICAL THERAPIST

## 2021-03-15 PROCEDURE — 97112 NEUROMUSCULAR REEDUCATION: CPT | Performed by: PHYSICAL THERAPIST

## 2021-03-15 PROCEDURE — 97110 THERAPEUTIC EXERCISES: CPT | Performed by: PHYSICAL THERAPIST

## 2021-03-15 NOTE — PROGRESS NOTES
Daily Note     Today's date: 3/15/2021  Patient name: Aguilar Montalvo  : 1990  MRN: [id number]  Referring provider: No ref  provider found  Dx:   Encounter Diagnosis     ICD-10-CM    1  Chronic pain of left knee  M25 562     G89 29                   Subjective: Doing pretty good today  The knee is sore and tight though as I was driving around all day today for work  Objective: See treatment diary below      Assessment: Fair tolerance to treatment  Significant stiffness of knee into flexion and extension  Would continue to benefit from PT  Plan: Continue per plan of care        Precautions: N/A      Manuals 3/12 3/15           R Knee PROM SE 10'  SE 10'            R Knee Mobs             IASTM L Distal Quad SE 5'  SE 5'                         Neuro Re-Ed             Quad Set 15x 15x           ASLR 3x10  3x10           Bridges             Sidestepping             Biodex             Hip 3 way on table                                                                              Ther Ex             Nustep 10' L10  10' L10           Squats  3x10           Leg Press             TKEs             Paloff Variations             Heel slides AAROM 2'  2'           Extension LLLD 2'  2'           Prone Knee Hang  5' 10# 5' 10#           Ther Activity                                       Gait Training                                       Modalities

## 2021-03-18 ENCOUNTER — APPOINTMENT (OUTPATIENT)
Dept: PHYSICAL THERAPY | Facility: REHABILITATION | Age: 31
End: 2021-03-18
Payer: COMMERCIAL

## 2021-03-22 ENCOUNTER — OFFICE VISIT (OUTPATIENT)
Dept: PHYSICAL THERAPY | Facility: REHABILITATION | Age: 31
End: 2021-03-22
Payer: COMMERCIAL

## 2021-03-22 DIAGNOSIS — M25.562 CHRONIC PAIN OF LEFT KNEE: Primary | ICD-10-CM

## 2021-03-22 DIAGNOSIS — G89.29 CHRONIC PAIN OF LEFT KNEE: Primary | ICD-10-CM

## 2021-03-22 PROCEDURE — 97110 THERAPEUTIC EXERCISES: CPT | Performed by: PHYSICAL THERAPIST

## 2021-03-22 PROCEDURE — 97112 NEUROMUSCULAR REEDUCATION: CPT | Performed by: PHYSICAL THERAPIST

## 2021-03-22 PROCEDURE — 97140 MANUAL THERAPY 1/> REGIONS: CPT | Performed by: PHYSICAL THERAPIST

## 2021-03-22 NOTE — PROGRESS NOTES
Daily Note     Today's date: 3/22/2021  Patient name: Tomas Noriega  : 1990  MRN: [id number]  Referring provider: No ref  provider found  Dx:   Encounter Diagnosis     ICD-10-CM    1  Chronic pain of left knee  M25 562     G89 29                   Subjective: Knee is feeling pretty good  I walked around all day today for work  Objective: See treatment diary below       Assessment: Patient tolerated treatment well today  Did have to modify prone knee extension to do with knee extended rather than knee flexed as he had discomfort in his knee  Did also need to regress sit to stand staggered foot position exercise to raise height of chair due to poor knee mobility and strength, especially eccentrically  Worked on this also with leg press focusing on eccentric control with left lower extremity emphasis  Would continue to benefit from skilled PT to address current deficits, improve QOL, and restore PLOF  Plan: Continue per plan of care  Precautions: N/A      Manuals 3/12 3/15 3/22          R Knee PROM SE 10'  SE 10'  SE 10'           R Knee Mobs             IASTM L Distal Quad SE 5'  SE 5'  SE 5'                        Neuro Re-Ed             Quad Set 15x 15x 20x          ASLR 3x10  3x10 3x10          Bridges             Sidestepping             Biodex             Hip 3 way on table   2x10 abd and ext   Knee extended          Sit to stand legs staggered    L foot behind right 3x10                                                               Ther Ex             Nustep 10' L10  10' L10 10' L10           Squats  3x10 3x10          Leg Press   85# 3x10 L ecc lower          TKEs   2x15 14#           Step Ups    3x10 6" L only          Heel slides AAROM 2'  2' 2'           Extension LLLD 2'  2' 2'           Prone Knee Hang  5' 10# 5' 10# 5' 10#          Ther Activity                                       Gait Training                                       Modalities

## 2021-03-25 ENCOUNTER — OFFICE VISIT (OUTPATIENT)
Dept: PHYSICAL THERAPY | Facility: REHABILITATION | Age: 31
End: 2021-03-25
Payer: COMMERCIAL

## 2021-03-25 DIAGNOSIS — M25.562 CHRONIC PAIN OF LEFT KNEE: Primary | ICD-10-CM

## 2021-03-25 DIAGNOSIS — G89.29 CHRONIC PAIN OF LEFT KNEE: Primary | ICD-10-CM

## 2021-03-25 PROCEDURE — 97110 THERAPEUTIC EXERCISES: CPT | Performed by: PHYSICAL THERAPIST

## 2021-03-25 PROCEDURE — 97112 NEUROMUSCULAR REEDUCATION: CPT | Performed by: PHYSICAL THERAPIST

## 2021-03-25 NOTE — PROGRESS NOTES
Daily Note     Today's date: 3/25/2021  Patient name: Jonas Macedo  : 1990  MRN: [id number]  Referring provider: No ref  provider found  Dx:   Encounter Diagnosis     ICD-10-CM    1  Chronic pain of left knee  M25 562     G89 29                   Subjective: Knee is doing alright, just a little sore today  Objective: See treatment diary below      Assessment: Tolerated treatment well  Continued loading lower extremities focusing on functional strength  ROM has difficulty making significant improvements between sessions due to long history of stiff knee  Patient demonstrated fatigue post treatment  Would continue to benefit from PT  Plan: Continue per plan of care  Precautions: N/A      Manuals 3/12 3/15 3/22 3/25         R Knee PROM SE 10'  SE 10'  SE 10'  SE 10'          R Knee Mobs             IASTM L Distal Quad SE 5'  SE 5'  SE 5'  np                      Neuro Re-Ed             Quad Set 15x 15x 20x 25x         ASLR 3x10  3x10 3x10 3x10         Bridges             Sidestepping             Biodex             Hip 3 way on table   2x10 abd and ext   Knee extended 3x10          Sit to stand legs staggered    L foot behind right 3x10  L behind right 3x10                                                              Ther Ex             Nustep 10' L10  10' L10 10' L10  10' L 10          Squats  3x10 3x10 3x10         Leg Press   85# 3x10 L ecc lower 85# 3x12 L ecc lower         TKEs   2x15 14#  3x20 25#         Step Ups    3x10 6" L only 3x10 6" L only 8# DBs         Heel slides AAROM 2'  2' 2'  2'         Extension LLLD 2'  2' 2'  2'         Prone Knee Hang  5' 10# 5' 10# 5' 10# 5' 10#         Ther Activity                                       Gait Training                                       Modalities

## 2021-03-29 ENCOUNTER — OFFICE VISIT (OUTPATIENT)
Dept: PHYSICAL THERAPY | Facility: REHABILITATION | Age: 31
End: 2021-03-29
Payer: COMMERCIAL

## 2021-03-29 DIAGNOSIS — G89.29 CHRONIC PAIN OF LEFT KNEE: Primary | ICD-10-CM

## 2021-03-29 DIAGNOSIS — M25.562 CHRONIC PAIN OF LEFT KNEE: Primary | ICD-10-CM

## 2021-03-29 PROCEDURE — 97110 THERAPEUTIC EXERCISES: CPT | Performed by: PHYSICAL THERAPIST

## 2021-03-29 PROCEDURE — 97112 NEUROMUSCULAR REEDUCATION: CPT | Performed by: PHYSICAL THERAPIST

## 2021-03-29 NOTE — PROGRESS NOTES
Daily Note     Today's date: 3/29/2021  Patient name: Jonas Macedo  : 1990  MRN: [id number]  Referring provider: No ref  provider found  Dx:   Encounter Diagnosis     ICD-10-CM    1  Chronic pain of left knee  M25 562     G89 29                   Subjective: Doing pretty good  Was doing a lot of lifting ove      Objective: See treatment diary below      Assessment: Doing well with progressions focusing on strengthening  Still limited with mobility  Would continue to benefit from PT  Plan: Continue per plan of care  Precautions: N/A      Manuals 3/12 3/15 3/22 3/25 3/29        R Knee PROM SE 10'  SE 10'  SE 10'  SE 10'  SE 10'         R Knee Mobs             IASTM L Distal Quad SE 5'  SE 5'  SE 5'  np                      Neuro Re-Ed             Quad Set 15x 15x 20x 25x 3x15        ASLR 3x10  3x10 3x10 3x10 3x15        Bridges             Sidestepping             Biodex             Hip 3 way on table   2x10 abd and ext   Knee extended 3x10  3x10        Sit to stand legs staggered    L foot behind right 3x10  L behind right 3x10  L stagger 3x15 12#                                                            Ther Ex             Nustep 10' L10  10' L10 10' L10  10' L 10  10' L 10        Squats  3x10 3x10 3x10 3x10         Leg Press   85# 3x10 L ecc lower 85# 3x12 L ecc lower 100# 3x15 ecc        TKEs   2x15 14#  3x20 25# 3x20 25#        Step Ups    3x10 6" L only 3x10 6" L only 8# DBs 3x10 6" L only 10# DBs        Heel slides AAROM 2'  2' 2'  2' 2'        Extension LLLD 2'  2' 2'  2' 2'        Prone Knee Hang  5' 10# 5' 10# 5' 10# 5' 10# 5' 10#        Ther Activity                                       Gait Training                                       Modalities

## 2021-04-05 ENCOUNTER — OFFICE VISIT (OUTPATIENT)
Dept: PHYSICAL THERAPY | Facility: REHABILITATION | Age: 31
End: 2021-04-05
Payer: COMMERCIAL

## 2021-04-05 DIAGNOSIS — G89.29 CHRONIC PAIN OF LEFT KNEE: Primary | ICD-10-CM

## 2021-04-05 DIAGNOSIS — M25.562 CHRONIC PAIN OF LEFT KNEE: Primary | ICD-10-CM

## 2021-04-05 PROCEDURE — 97112 NEUROMUSCULAR REEDUCATION: CPT | Performed by: PHYSICAL THERAPIST

## 2021-04-05 PROCEDURE — 97140 MANUAL THERAPY 1/> REGIONS: CPT | Performed by: PHYSICAL THERAPIST

## 2021-04-05 PROCEDURE — 97110 THERAPEUTIC EXERCISES: CPT | Performed by: PHYSICAL THERAPIST

## 2021-04-05 NOTE — PROGRESS NOTES
PT Discharge     Today's date: 2021  Patient name: Kulwinder Nichols  : 1990  MRN: [id number]  Referring provider: No ref  provider found  Dx:   Encounter Diagnosis     ICD-10-CM    1  Chronic pain of left knee  M25 562     G89 29            Patient did not return to PT visits after this date without calling to make future appointments  As a result, patient has been discharged from PT at this time  Subjective: Forgot about my last appointment sorry about that  Objective: See treatment diary below      Assessment: Working on loading strategies for knee and how to optimize function with lack of mobility he has  Minimal to no significant gains in range of motion passively nor actively, but is showing some mild improvements in his strength and function  Needs to continue working on eccentric strength, single limb loading, and functional movement capacity  Would continue to benefit from PT  Plan: Continue per plan of care  Precautions: N/A      Manuals 3/12 3/15 3/22 3/25 3/29 4/5       R Knee PROM SE 10'  SE 10'  SE 10'  SE 10'  SE 10'  SE 10'        R Knee Mobs             IASTM L Distal Quad SE 5'  SE 5'  SE 5'  np                      Neuro Re-Ed             Quad Set 15x 15x 20x 25x 3x15 3x15       ASLR 3x10  3x10 3x10 3x10 3x15 3x15       Bridges             Sidestepping             Biodex             Hip 3 way on table   2x10 abd and ext   Knee extended 3x10  3x10 3x10       Sit to stand legs staggered    L foot behind right 3x10  L behind right 3x10  L stagger 3x15 12# L stagger 3x15 12#                                                           Ther Ex             Nustep 10' L10  10' L10 10' L10  10' L 10  10' L 10 10' L10        Squats  3x10 3x10 3x10 3x10  3x10 100# 3x15 ecc        Leg Press   85# 3x10 L ecc lower 85# 3x12 L ecc lower 100# 3x15 ecc 100# 3x15 ecc        TKEs   2x15 14#  3x20 25# 3x20 25# 3x20 25#        Step Ups    3x10 6" L only 3x10 6" L only 8# DBs 3x10 6" L only 10# DBs 3x10 6" L only 10# DBs        Heel slides AAROM 2'  2' 2'  2' 2' 2'        Extension LLLD 2'  2' 2'  2' 2' 2'        Prone Knee Hang  5' 10# 5' 10# 5' 10# 5' 10# 5' 10# 5' 10#        Ther Activity                                       Gait Training                                       Modalities

## 2021-10-03 NOTE — PROGRESS NOTES
Daily Note     Today's date: 10/7/2019  Patient name: Patricio Jasso  : 1990  MRN: [id number]  Referring provider: Pricila Spears PA-C  Dx:   Encounter Diagnosis     ICD-10-CM    1  Status post surgery [id number]                   Subjective: Patient reports pain medially on the screw states that he has pain and numbness  1:1 w/ PTA 45 min, indep TE remaining  Objective: See treatment diary below  Manual  9/30   9/11 9/12 9/16 9/18 x25' 10/2 10/7   Prone knee flexion st     HS HS   Ext HS hold    Seated knee flex LEFT     HS HS HS       Supine knee ext st     HS HS HS HS hold HS   Scar massage/mobs                 IASTM LEFT                  Prone knee flex     HS HS               Exercise Diary      9/11 9/12 9/16 9/18 10/2 10/7   Nu Step 10' 10' bike   10' bike  10' Nu 10' bike 10' Nu 10' Nu  10' Nu 10' Bike 10' nustep  10' bike 10' Nu 10' bike   Quad sets                 SAQ                 SLR - flex                 Prone knee flexion                 SLS                 Extended knee flex                 sidestepping                 Calf/hamstring stretch                 Prone knee flex stretch     10' 10'         Prone knee ext supine 10" w/ strap and in prone with 5# MHP   10' 5# w/ MHP Supine 10' 5#w/ MHP Supine 10" 5# w/ MHP Supine 10" w/ strap w  MHP and prone 10' w/ 5# Supine 10' w/ strap w/MHP and prone 10' w/5# Supine 10' w/ strap w/MHP and prone 10' w/ 5#                                                                                                   Modalities                                                                      Assessment: Decreased tolerance for all stretching reports extreme pain  Plan: Continue per plan of care  oral

## 2022-07-23 PROBLEM — J30.0 VASOMOTOR RHINITIS: Status: ACTIVE | Noted: 2022-07-23

## 2024-02-21 PROBLEM — R50.9 FEVER: Status: RESOLVED | Noted: 2019-05-08 | Resolved: 2024-02-21

## 2024-03-08 ENCOUNTER — APPOINTMENT (OUTPATIENT)
Dept: URGENT CARE | Age: 34
End: 2024-03-08

## (undated) DEVICE — SILVER-COATED ANTIMICROBIAL BARRIER DRESSING: Brand: ACTICOAT   4" X 8"

## (undated) DEVICE — ANTIBACTERIAL UNDYED BRAIDED (POLYGLACTIN 910), SYNTHETIC ABSORBABLE SUTURE: Brand: COATED VICRYL

## (undated) DEVICE — PADDING CAST 4 IN  COTTON STRL

## (undated) DEVICE — PROXIMATE PLUS MD MULTI-DIRECTIONAL RELEASE SKIN STAPLERS CONTAINS 35 STAINLESS STEEL STAPLES APPROXIMATE CLOSED DIMENSIONS: 6.9MM X 3.9MM WIDE: Brand: PROXIMATE

## (undated) DEVICE — KERLIX BANDAGE ROLL: Brand: KERLIX

## (undated) DEVICE — 4.2MM THREE-FLUTED DRILL BIT QC/NEEDLE POINT/145MM

## (undated) DEVICE — GAUZE SPONGES,16 PLY: Brand: CURITY

## (undated) DEVICE — 2.5MM REAMING ROD WITH BALL TIP/950MM-STERILE

## (undated) DEVICE — ACE WRAP 4 IN UNSTERILE

## (undated) DEVICE — CHLORAPREP HI-LITE 26ML ORANGE

## (undated) DEVICE — PAD GROUNDING ADULT

## (undated) DEVICE — DRAPE C-ARMOUR

## (undated) DEVICE — UNIVERSAL MAJOR EXTREMITY,KIT: Brand: CARDINAL HEALTH

## (undated) DEVICE — ABDOMINAL PAD: Brand: DERMACEA

## (undated) DEVICE — 3.5MM DRILL BIT/QC/110MM

## (undated) DEVICE — 3M™ TEGADERM™ TRANSPARENT FILM DRESSING FRAME STYLE, 1626W, 4 IN X 4-3/4 IN (10 CM X 12 CM), 50/CT 4CT/CASE: Brand: 3M™ TEGADERM™

## (undated) DEVICE — INTENDED FOR TISSUE SEPARATION, AND OTHER PROCEDURES THAT REQUIRE A SHARP SURGICAL BLADE TO PUNCTURE OR CUT.: Brand: BARD-PARKER SAFETY BLADES SIZE 10, STERILE

## (undated) DEVICE — SUT ETHILON 3-0 FSLX 30 IN 1673H

## (undated) DEVICE — PENCIL ELECTROSURG E-Z CLEAN -0035H

## (undated) DEVICE — SUT VICRYL PLUS 2-0 CTB-1 27 IN VCPB259H

## (undated) DEVICE — BANDAGE, ESMARK LF STR 6"X9' (20/CS): Brand: CYPRESS

## (undated) DEVICE — ACE WRAP 6 IN UNSTERILE

## (undated) DEVICE — 5.0MM CANNULATED DRILL BIT LARGE QC/300MM

## (undated) DEVICE — INTENT TO BE USED WITH SUTURE MATERIAL FOR TISSUE CLOSURE: Brand: RICHARD-ALLAN®  NEEDLE 1/2 CIRCLE REVERSE CUTTING

## (undated) DEVICE — 2.8MM THREADED GUIDE WIRE- TROCAR POINT 300MM

## (undated) DEVICE — GLOVE SRG BIOGEL 8

## (undated) DEVICE — 3.2MM GUIDE WIRE 290MM

## (undated) DEVICE — GLOVE INDICATOR PI UNDERGLOVE SZ 8.5 BLUE

## (undated) DEVICE — SUT VICRYL PLUS 0 CTB-1 27 IN VCPB260H

## (undated) DEVICE — SPONGE PVP SCRUB WING STERILE

## (undated) DEVICE — CURITY NON-ADHERENT STRIPS: Brand: CURITY

## (undated) DEVICE — PADDING,UNDERCAST,COTTON, 4"X4YD STERILE: Brand: MEDLINE

## (undated) DEVICE — IMPERVIOUS STOCKINETTE: Brand: DEROYAL

## (undated) DEVICE — DISPOSABLE EQUIPMENT COVER: Brand: SMALL TOWEL DRAPE

## (undated) DEVICE — DRAIN HEMOVAC 1/4 IN KIT

## (undated) DEVICE — SUT FIBERWIRE #2 1/2 CIRCLE T-5 38IN AR-7200

## (undated) DEVICE — 2.5MM DRILL BIT/QC/GOLD/110MM

## (undated) DEVICE — SUT PDS II 1 CTX-B 36 IN ZB371

## (undated) DEVICE — SUT VICRYL 0 CT-1 27 IN J260H

## (undated) DEVICE — PACK MAJOR ORTHO W/SPLITS PBDS

## (undated) DEVICE — UNDYED BRAIDED (POLYGLACTIN 910), SYNTHETIC ABSORBABLE SUTURE: Brand: COATED VICRYL

## (undated) DEVICE — DRAPE EQUIPMENT RF WAND

## (undated) DEVICE — DRAPE SHEET THREE QUARTER

## (undated) DEVICE — STOCKINETTE IMPERVIOUS

## (undated) DEVICE — INTENDED FOR TISSUE SEPARATION, AND OTHER PROCEDURES THAT REQUIRE A SHARP SURGICAL BLADE TO PUNCTURE OR CUT.: Brand: BARD-PARKER SAFETY BLADES SIZE 15, STERILE

## (undated) DEVICE — BULB SYRINGE,IRRIGATION WITH PROTECTIVE CAP: Brand: DOVER

## (undated) DEVICE — SUT PDS II 2-0 CT-1 27 IN Z339H

## (undated) DEVICE — 2.5MM DRILL BIT/QC/GOLD/180MM

## (undated) DEVICE — SUT VICRYL PLUS 1 CTB-1 36 IN VCPB947H

## (undated) DEVICE — PROXIMATE SKIN STAPLERS (35 WIDE) CONTAINS 35 STAINLESS STEEL STAPLES (FIXED HEAD): Brand: PROXIMATE

## (undated) DEVICE — DRAPE C-ARM X-RAY

## (undated) DEVICE — REM POLYHESIVE ADULT PATIENT RETURN ELECTRODE: Brand: VALLEYLAB

## (undated) DEVICE — 1.8MM DRILL BIT WITH DEPTH MARK/QC/110MM

## (undated) DEVICE — 4.2MM THREE-FLUTED DRILL BIT QC/330MM/100MM CALIBRATION